# Patient Record
Sex: FEMALE | Race: WHITE | NOT HISPANIC OR LATINO | Employment: OTHER | URBAN - METROPOLITAN AREA
[De-identification: names, ages, dates, MRNs, and addresses within clinical notes are randomized per-mention and may not be internally consistent; named-entity substitution may affect disease eponyms.]

---

## 2017-03-08 ENCOUNTER — GENERIC CONVERSION - ENCOUNTER (OUTPATIENT)
Dept: OTHER | Facility: OTHER | Age: 72
End: 2017-03-08

## 2017-04-06 ENCOUNTER — TRANSCRIBE ORDERS (OUTPATIENT)
Dept: LAB | Facility: CLINIC | Age: 72
End: 2017-04-06

## 2017-04-06 ENCOUNTER — APPOINTMENT (OUTPATIENT)
Dept: LAB | Facility: CLINIC | Age: 72
End: 2017-04-06
Payer: MEDICARE

## 2017-04-06 DIAGNOSIS — I10 ESSENTIAL (PRIMARY) HYPERTENSION: ICD-10-CM

## 2017-04-06 DIAGNOSIS — E78.5 HYPERLIPIDEMIA: ICD-10-CM

## 2017-04-06 DIAGNOSIS — J45.909 UNCOMPLICATED ASTHMA: ICD-10-CM

## 2017-04-06 LAB
ALBUMIN SERPL BCP-MCNC: 3.4 G/DL (ref 3.5–5)
ALP SERPL-CCNC: 77 U/L (ref 46–116)
ALT SERPL W P-5'-P-CCNC: 90 U/L (ref 12–78)
ANION GAP SERPL CALCULATED.3IONS-SCNC: 4 MMOL/L (ref 4–13)
AST SERPL W P-5'-P-CCNC: 53 U/L (ref 5–45)
BACTERIA UR QL AUTO: ABNORMAL /HPF
BASOPHILS # BLD AUTO: 0.01 THOUSANDS/ΜL (ref 0–0.1)
BASOPHILS NFR BLD AUTO: 0 % (ref 0–1)
BILIRUB SERPL-MCNC: 0.54 MG/DL (ref 0.2–1)
BILIRUB UR QL STRIP: NEGATIVE
BUN SERPL-MCNC: 13 MG/DL (ref 5–25)
CALCIUM SERPL-MCNC: 9 MG/DL (ref 8.3–10.1)
CHLORIDE SERPL-SCNC: 104 MMOL/L (ref 100–108)
CHOLEST SERPL-MCNC: 139 MG/DL (ref 50–200)
CLARITY UR: CLEAR
CO2 SERPL-SCNC: 32 MMOL/L (ref 21–32)
COLOR UR: YELLOW
CREAT SERPL-MCNC: 1.04 MG/DL (ref 0.6–1.3)
EOSINOPHIL # BLD AUTO: 0.21 THOUSAND/ΜL (ref 0–0.61)
EOSINOPHIL NFR BLD AUTO: 4 % (ref 0–6)
ERYTHROCYTE [DISTWIDTH] IN BLOOD BY AUTOMATED COUNT: 12.6 % (ref 11.6–15.1)
GFR SERPL CREATININE-BSD FRML MDRD: 52.4 ML/MIN/1.73SQ M
GLUCOSE P FAST SERPL-MCNC: 85 MG/DL (ref 65–99)
GLUCOSE UR STRIP-MCNC: NEGATIVE MG/DL
HCT VFR BLD AUTO: 36 % (ref 34.8–46.1)
HDLC SERPL-MCNC: 70 MG/DL (ref 40–60)
HGB BLD-MCNC: 11.9 G/DL (ref 11.5–15.4)
HGB UR QL STRIP.AUTO: ABNORMAL
KETONES UR STRIP-MCNC: NEGATIVE MG/DL
LDLC SERPL CALC-MCNC: 48 MG/DL (ref 0–100)
LEUKOCYTE ESTERASE UR QL STRIP: NEGATIVE
LYMPHOCYTES # BLD AUTO: 1.24 THOUSANDS/ΜL (ref 0.6–4.47)
LYMPHOCYTES NFR BLD AUTO: 23 % (ref 14–44)
MCH RBC QN AUTO: 30.6 PG (ref 26.8–34.3)
MCHC RBC AUTO-ENTMCNC: 33.1 G/DL (ref 31.4–37.4)
MCV RBC AUTO: 93 FL (ref 82–98)
MONOCYTES # BLD AUTO: 0.37 THOUSAND/ΜL (ref 0.17–1.22)
MONOCYTES NFR BLD AUTO: 7 % (ref 4–12)
NEUTROPHILS # BLD AUTO: 3.47 THOUSANDS/ΜL (ref 1.85–7.62)
NEUTS SEG NFR BLD AUTO: 66 % (ref 43–75)
NITRITE UR QL STRIP: NEGATIVE
NON-SQ EPI CELLS URNS QL MICRO: ABNORMAL /HPF
NRBC BLD AUTO-RTO: 0 /100 WBCS
PH UR STRIP.AUTO: 6.5 [PH] (ref 4.5–8)
PLATELET # BLD AUTO: 206 THOUSANDS/UL (ref 149–390)
PMV BLD AUTO: 12 FL (ref 8.9–12.7)
POTASSIUM SERPL-SCNC: 3.7 MMOL/L (ref 3.5–5.3)
PROT SERPL-MCNC: 6.6 G/DL (ref 6.4–8.2)
PROT UR STRIP-MCNC: ABNORMAL MG/DL
RBC # BLD AUTO: 3.89 MILLION/UL (ref 3.81–5.12)
RBC #/AREA URNS AUTO: ABNORMAL /HPF
SODIUM SERPL-SCNC: 140 MMOL/L (ref 136–145)
SP GR UR STRIP.AUTO: 1.02 (ref 1–1.03)
TRIGL SERPL-MCNC: 106 MG/DL
UROBILINOGEN UR QL STRIP.AUTO: 0.2 E.U./DL
WBC # BLD AUTO: 5.32 THOUSAND/UL (ref 4.31–10.16)
WBC #/AREA URNS AUTO: ABNORMAL /HPF

## 2017-04-06 PROCEDURE — 36415 COLL VENOUS BLD VENIPUNCTURE: CPT

## 2017-04-06 PROCEDURE — 80061 LIPID PANEL: CPT

## 2017-04-06 PROCEDURE — 85025 COMPLETE CBC W/AUTO DIFF WBC: CPT

## 2017-04-06 PROCEDURE — 80053 COMPREHEN METABOLIC PANEL: CPT

## 2017-04-06 PROCEDURE — 87086 URINE CULTURE/COLONY COUNT: CPT

## 2017-04-06 PROCEDURE — 81001 URINALYSIS AUTO W/SCOPE: CPT

## 2017-04-07 ENCOUNTER — GENERIC CONVERSION - ENCOUNTER (OUTPATIENT)
Dept: OTHER | Facility: OTHER | Age: 72
End: 2017-04-07

## 2017-04-07 LAB — BACTERIA UR CULT: NORMAL

## 2017-04-10 ENCOUNTER — ALLSCRIPTS OFFICE VISIT (OUTPATIENT)
Dept: OTHER | Facility: OTHER | Age: 72
End: 2017-04-10

## 2017-05-22 DIAGNOSIS — E78.5 HYPERLIPIDEMIA: ICD-10-CM

## 2017-05-25 ENCOUNTER — ALLSCRIPTS OFFICE VISIT (OUTPATIENT)
Dept: OTHER | Facility: OTHER | Age: 72
End: 2017-05-25

## 2017-06-20 ENCOUNTER — GENERIC CONVERSION - ENCOUNTER (OUTPATIENT)
Dept: OTHER | Facility: OTHER | Age: 72
End: 2017-06-20

## 2017-06-27 ENCOUNTER — GENERIC CONVERSION - ENCOUNTER (OUTPATIENT)
Dept: OTHER | Facility: OTHER | Age: 72
End: 2017-06-27

## 2017-07-24 ENCOUNTER — GENERIC CONVERSION - ENCOUNTER (OUTPATIENT)
Dept: OTHER | Facility: OTHER | Age: 72
End: 2017-07-24

## 2017-08-02 ENCOUNTER — GENERIC CONVERSION - ENCOUNTER (OUTPATIENT)
Dept: OTHER | Facility: OTHER | Age: 72
End: 2017-08-02

## 2017-08-29 ENCOUNTER — APPOINTMENT (OUTPATIENT)
Dept: LAB | Facility: CLINIC | Age: 72
End: 2017-08-29
Payer: MEDICARE

## 2017-08-29 ENCOUNTER — TRANSCRIBE ORDERS (OUTPATIENT)
Dept: LAB | Facility: CLINIC | Age: 72
End: 2017-08-29

## 2017-08-29 ENCOUNTER — GENERIC CONVERSION - ENCOUNTER (OUTPATIENT)
Dept: OTHER | Facility: OTHER | Age: 72
End: 2017-08-29

## 2017-08-29 DIAGNOSIS — E78.5 HYPERLIPIDEMIA: ICD-10-CM

## 2017-08-29 LAB
ALBUMIN SERPL BCP-MCNC: 3.7 G/DL (ref 3.5–5)
ALP SERPL-CCNC: 70 U/L (ref 46–116)
ALT SERPL W P-5'-P-CCNC: 52 U/L (ref 12–78)
ANION GAP SERPL CALCULATED.3IONS-SCNC: 5 MMOL/L (ref 4–13)
AST SERPL W P-5'-P-CCNC: 32 U/L (ref 5–45)
BILIRUB SERPL-MCNC: 0.52 MG/DL (ref 0.2–1)
BUN SERPL-MCNC: 13 MG/DL (ref 5–25)
CALCIUM SERPL-MCNC: 9.6 MG/DL (ref 8.3–10.1)
CHLORIDE SERPL-SCNC: 104 MMOL/L (ref 100–108)
CHOLEST SERPL-MCNC: 153 MG/DL (ref 50–200)
CO2 SERPL-SCNC: 30 MMOL/L (ref 21–32)
CREAT SERPL-MCNC: 1.1 MG/DL (ref 0.6–1.3)
GFR SERPL CREATININE-BSD FRML MDRD: 51 ML/MIN/1.73SQ M
GLUCOSE P FAST SERPL-MCNC: 92 MG/DL (ref 65–99)
HDLC SERPL-MCNC: 65 MG/DL (ref 40–60)
LDLC SERPL CALC-MCNC: 74 MG/DL (ref 0–100)
POTASSIUM SERPL-SCNC: 4.2 MMOL/L (ref 3.5–5.3)
PROT SERPL-MCNC: 7 G/DL (ref 6.4–8.2)
SODIUM SERPL-SCNC: 139 MMOL/L (ref 136–145)
T4 FREE SERPL-MCNC: 1.02 NG/DL (ref 0.76–1.46)
TRIGL SERPL-MCNC: 71 MG/DL
TSH SERPL DL<=0.05 MIU/L-ACNC: 1.44 UIU/ML (ref 0.36–3.74)

## 2017-08-29 PROCEDURE — 84443 ASSAY THYROID STIM HORMONE: CPT

## 2017-08-29 PROCEDURE — 84439 ASSAY OF FREE THYROXINE: CPT

## 2017-08-29 PROCEDURE — 80053 COMPREHEN METABOLIC PANEL: CPT

## 2017-08-29 PROCEDURE — 80061 LIPID PANEL: CPT

## 2017-08-29 PROCEDURE — 36415 COLL VENOUS BLD VENIPUNCTURE: CPT

## 2017-08-30 ENCOUNTER — GENERIC CONVERSION - ENCOUNTER (OUTPATIENT)
Dept: OTHER | Facility: OTHER | Age: 72
End: 2017-08-30

## 2017-08-31 ENCOUNTER — ALLSCRIPTS OFFICE VISIT (OUTPATIENT)
Dept: OTHER | Facility: OTHER | Age: 72
End: 2017-08-31

## 2017-09-21 ENCOUNTER — TRANSCRIBE ORDERS (OUTPATIENT)
Dept: SLEEP CENTER | Facility: CLINIC | Age: 72
End: 2017-09-21

## 2017-09-21 ENCOUNTER — HOSPITAL ENCOUNTER (OUTPATIENT)
Dept: SLEEP CENTER | Facility: CLINIC | Age: 72
Discharge: HOME | End: 2017-09-21
Payer: MEDICARE

## 2017-09-21 DIAGNOSIS — G47.33 OSA (OBSTRUCTIVE SLEEP APNEA): Primary | ICD-10-CM

## 2017-09-21 DIAGNOSIS — Z99.89 OSA ON CPAP: ICD-10-CM

## 2017-09-21 DIAGNOSIS — G47.33 OSA ON CPAP: ICD-10-CM

## 2017-12-10 ENCOUNTER — OFFICE VISIT (OUTPATIENT)
Dept: URGENT CARE | Facility: CLINIC | Age: 72
End: 2017-12-10
Payer: MEDICARE

## 2017-12-10 PROCEDURE — G0463 HOSPITAL OUTPT CLINIC VISIT: HCPCS

## 2017-12-10 PROCEDURE — 99203 OFFICE O/P NEW LOW 30 MIN: CPT

## 2018-01-03 ENCOUNTER — ALLSCRIPTS OFFICE VISIT (OUTPATIENT)
Dept: OTHER | Facility: OTHER | Age: 73
End: 2018-01-03

## 2018-01-03 DIAGNOSIS — N18.30 CHRONIC KIDNEY DISEASE, STAGE III (MODERATE) (HCC): ICD-10-CM

## 2018-01-03 DIAGNOSIS — E78.5 HYPERLIPIDEMIA: ICD-10-CM

## 2018-01-03 DIAGNOSIS — I10 ESSENTIAL (PRIMARY) HYPERTENSION: ICD-10-CM

## 2018-01-08 NOTE — PROGRESS NOTES
Assessment   1  Acute sinusitis (461 9) (J01 90)   2  Esophagitis, reflux (530 11) (K21 0)   3  Benign essential hypertension (401 1) (I10)   4  Dyslipidemia (272 4) (E78 5)    Plan   Acute sinusitis    · LevoFLOXacin 500 MG Oral Tablet; TAKE 1 TABLET DAILY AS DIRECTED  Benign essential hypertension, CKD (chronic kidney disease), stage III, Dyslipidemia    · (1) COMPREHENSIVE METABOLIC PANEL; Status:Active; Requested CFH:[id number];    · (1) LIPID PANEL, FASTING; Status:Active; Requested EGR:[id number]; Dyslipidemia    · Atorvastatin Calcium 10 MG Oral Tablet (Lipitor); Take 1 tablet by mouth  daily  Encounter for special screening examination for genitourinary disorder    · *VB - Urinary Incontinence Screen (Dx Z13 89 Screen for UI); Status:Complete;   Done:    86OGV9466 11:32AM  Esophagitis, reflux    · Esomeprazole Magnesium 40 MG Oral Capsule Delayed Release; TAKE 1    CAPSULE Daily  Hypertension    · Valsartan-Hydrochlorothiazide 320-12 5 MG Oral Tablet (Diovan HCT); TAKE    ONE TABLET BY MOUTH ONCE DAILY  Mild persistent asthma without complication    · Advair Diskus 100-50 MCG/DOSE Inhalation Aerosol Powder Breath Activated;    INHALE ONE PUFF BY MOUTH EVERY TWELVE HOURS   · Albuterol Sulfate (2 5 MG/3ML) 0 083% Inhalation Nebulization Solution; USE 1    UNIT DOSE EVERY 4-6 HOURS AS NEEDED FOR WHEEZING     Discussion/Summary      Flonase daily, hydration, rest, prn tylenol/motrin for fevers/aches  current regimen, obtain labs and due for f/u in 4 months  mammo from GYN  pads, if worsening can follow with urogyn  The patient was counseled regarding diagnostic results,-- instructions for management,-- risk factor reductions,-- prognosis,-- patient and family education,-- impressions,-- importance of compliance with treatment  Possible side effects of new medications were reviewed with the patient/guardian today  The treatment plan was reviewed with the patient/guardian   The patient/guardian understands and agrees with the treatment plan      Chief Complaint   Pt is here for a check up  Pt has c/o being sick  Pt stated she was seen in the UC on 12/10/17 and was given an antibiotics but that did not help  Pt stated she also got the GI bug with vomiting, fever/chills on New Year's Delphine and she has been down ever since  History of Present Illness   Patient is a 80 yo female with PMH of allergies, HTN, anxiety/depression, MAITE on cpap, HLD, osteopenia asthma who presents for routine follow up  She has not been taking fosamax and colestipol  She has been taking protonix but feels nexium worked better for her  Her BP is at goal  She gets her mammos from her GYN Dr Emely Iniguez  She did get her flu shot this year at [facility]  She denies falls  She does report intermittent urinary leakage, she does kegels when she can remember  She has hx of 2 nasal surgeries and has allergies  She was seen in Urgent care in beginning of dec for sinusitis and given augmentin  She was using flonase but then stopped  She reports starting with fever, chills, vomiting, naausea, fatigue - GI bug on New Years Delphine and has felt miserable ever since  Her sinus congesion and pressure and frontal sinus headaches came back with a vengeance  She has been using nasal saline spray and zytrec but no relief  Review of Systems        Constitutional: feeling poorly,-- chills-- and-- feeling tired, but-- no recent weight gain-- and-- no recent weight loss  Eyes: no eye pain,-- eyes not red-- and-- no purulent discharge from the eyes  ENT: nasal discharge-- and-- frontal sinus headache and pressure, but-- as noted in HPI,-- no earache,-- no nosebleeds,-- no sore throat,-- no hearing loss-- and-- no hoarseness  Cardiovascular: the heart rate was not slow,-- no chest pain,-- the heart rate was not fast-- and-- no palpitations        Respiratory: cough, but-- no shortness of breath,-- no wheezing-- and-- no shortness of breath during exertion  Gastrointestinal: abdominal pain,-- nausea,-- vomiting-- and-- improving, but-- no constipation-- and-- no diarrhea  Genitourinary: incontinence, but-- no dysuria,-- no pelvic pain-- and-- no unexplained vaginal bleeding  Musculoskeletal: arthralgias-- and-- myalgias, but-- no joint swelling  Integumentary: no rashes,-- no skin lesions-- and-- no skin wound  Neurological: headache, but-- as noted in HPI,-- no numbness,-- no confusion,-- no dizziness,-- no convulsions,-- no fainting-- and-- no difficulty walking  Psychiatric: anxiety,-- depression-- and-- stable  Endocrine: no proptosis,-- no muscle weakness-- and-- no hot flashes  Hematologic/Lymphatic: no swollen glands,-- no tendency for easy bleeding-- and-- no tendency for easy bruising  Preventive Quality 65 and Older: Falls Risk: The patient fell 0 times in the past 12 months  Urinary Incontinence Symptoms includes: urinary urgency, but no incomplete bladder emptying, no urinary frequency, no urinary hesitancy, no dysuria and no weak stream        ROS reviewed  Active Problems   1  Acute sinusitis (461 9) (J01 90)   2  Age-related nuclear cataract, bilateral (366 16) (H25 13)   3  Allergic rhinitis (477 9) (J30 9)   4  Anemia (285 9) (D64 9)   5  Anxiety disorder (300 00) (F41 9)   6  Benign essential hypertension (401 1) (I10)   7  Central sleep apnea (780 57) (G47 31)   8  Chronic diarrhea (787 91) (K52 9)   9  CKD (chronic kidney disease), stage III (585 3) (N18 3)   10  Depression with anxiety (300 4) (F41 8)   11  Dyslipidemia (272 4) (E78 5)   12  Esophagitis, reflux (530 11) (K21 0)   13  Fatigue (780 79) (R53 83)   14  Glaucoma screening (V80 1) (Z13 5)   15  Hypertension (401 9) (I10)   16  Hypoproteinemia (273 8) (E77 8)   17  Irritant contact dermatitis due to other agents (692 89) (L24 89)   18  Loss of height (781 91) (R29 890)   19  Microscopic hematuria (599 72) (R31 70)   20   Mild persistent asthma without complication (764 65) (P99 77)   21  Nephrosclerosis (403 90,587) (I12 9)   22  Osteoarthritis (715 90) (M19 90)   23  Osteoarthritis of left knee (715 96) (M17 12)   24  Osteopenia (733 90) (M85 80)   25  Overactive bladder (596 51) (N32 81)   26  Pre-operative exam (V72 84) (Z01 818)   27  Primary insomnia (307 42) (F51 01)   28  Pruritus (698 9) (L29 9)   29  Sleep apnea (780 57) (G47 30)   30  Vitamin B12 deficiency (266 2) (E53 8)   31  Vitamin D deficiency (268 9) (E55 9)    Past Medical History   1  History of Acute bronchitis, unspecified organism (466 0) (J20 9)   2  History of Acute bronchospasm (519 11) (J98 01)   3  History of Acute ethmoidal sinusitis (461 2) (J01 20)   4  History of Acute frontal sinusitis (461 1) (J01 10)   5  History of Acute renal insufficiency (593 9) (N28 9)   6  History of Acute upper respiratory infection (465 9) (J06 9)   7  History of Asthmatic bronchitis with exacerbation (493 92) (J45 901)   8  History Of 3  Previous Pregnancies (V61 5)   9  History of acute bronchitis (V12 69) (Z87 09)   10  History of urinary tract infection (V13 02) (Z87 440)   11  History of Postmenopausal Atrophic Vaginitis (627 3)     The active problems and past medical history were reviewed and updated today  Surgical History   1  History of Dilation And Curettage   2  History of Hysteroscopy Of Uterus   3  History of Hysteroscopy With Resection For Intrauterine Polyp Removal   4  History of Nasal Endoscopy Polypectomy   5  History of Tonsillectomy     The surgical history was reviewed and updated today  Family History   Mother    1  Family history of Acute Myocardial Infarction (V17 3)   2  Family history of Osteoporosis (V17 81)   3  Family history of Uterine Cancer (V16 49)  Family History    4  Denied: Family history of Breast Cancer   5  Denied: Family history of Colon Cancer   6   Denied: Family history of Ovarian Cancer     The family history was reviewed and updated today  Social History    · Being A Social Drinker   · Denied: History of Drug Use   · Never A Smoker   · Uses Safety Equipment - Seatbelts  The social history was reviewed and is unchanged  Current Meds    1  Advair Diskus 100-50 MCG/DOSE Inhalation Aerosol Powder Breath Activated; INHALE     ONE PUFF BY MOUTH EVERY TWELVE HOURS  Requested for: 83PAD2538; Last     VE:13CJW6016 Ordered   2  Albuterol Sulfate (2 5 MG/3ML) 0 083% Inhalation Nebulization Solution; USE 1 UNIT     DOSE EVERY 4-6 HOURS AS NEEDED FOR WHEEZING ; Therapy: 63PAJ1433 to (Evaluate:10Nov2015)  Requested for: 93JKJ6888; Last     Rx:06Kkr4689 Ordered   3  Alendronate Sodium 70 MG Oral Tablet; TAKE 1 TABLET BY MOUTH ONCE WEEKLY; Therapy: 65LWD9913 to (Evaluate:75Iav9896)  Requested for: 07XMJ3654; Last     Rx:23Rno6052 Ordered   4  Atorvastatin Calcium 10 MG Oral Tablet; Take 1 tablet by mouth  daily; Therapy: 14VQF5587 to (Magdalena Wallace)  Requested for: 73Pzp1185; Last     Rx:67Oxu1215; Status: ACTIVE - Transmit to Pharmacy - Awaiting Verification Ordered   5  Caltrate 600 TABS; Take one tablet twice daily; Therapy: (Recorded:84Fqf7300) to Recorded   6  Escitalopram Oxalate 20 MG Oral Tablet; take 1 tablet by mouth once daily; Therapy: 83QJN3482 to (26 531592)  Requested for: 10Fvg1911; Last     Rx:76Imj5690 Ordered   7  Fish Oil 1000 MG Oral Capsule; Take one capsule twice daily; Therapy: (Recorded:70Lke7787) to Recorded   8  Flonase 50 MCG/ACT SUSP; Therapy: (Recorded:69Fez5024) to Recorded   9  HydrOXYzine HCl - 25 MG Oral Tablet; TAKE ONE TABLET BY MOUTH THREE TIMES A     DAY AS NEEDED FOR ANXIETY; Therapy: 37Bir2781 to (Last Rx:30Iir2326)  Requested for: 95Jjc8799 Ordered   10  Iron TABS; Therapy: (Monroe Merchant) to Recorded   11  Valsartan-Hydrochlorothiazide 320-12 5 MG Oral Tablet; TAKE ONE TABLET BY MOUTH      ONCE DAILY;       Therapy: 29WYW1623 to (ZCSYNKHP:36TXK0958)  Requested for: 27Ryc9578; Last      Rx:88Xnw6480 Ordered   12  Ventolin  (90 Base) MCG/ACT Inhalation Aerosol Solution; INHALE 2 PUFFS      EVERY 4-6 HOURS AS NEEDED; Therapy: 11DSK4676 to (Last Rx:17Aze5190)  Requested for: 21MVD0593 Ordered   13  ZyrTEC Allergy 10 MG Oral Tablet; TAKE 1 TABLET AT BEDTIME; Therapy: (Recorded:12Wtg6342) to Recorded     The medication list was reviewed and updated today  Allergies   1  Biaxin TABS   2  Codeine Sulfate TABS   3  Estrace CREA   4  hydrocodone   5  Mobic TABS    Vitals   Vital Signs    Recorded: 29OHG5935 07:32PM   Temperature 97 8 F   Heart Rate 67   Respiration 18   Systolic 898   Diastolic 78   Height 5 ft 5 35 in   Weight 185 lb 8 oz   BMI Calculated 30 54   BSA Calculated 1 92   O2 Saturation 96     Physical Exam        Constitutional      General appearance: No acute distress, well appearing and well nourished  overweight-- and-- appears tired  Eyes      Conjunctiva and lids: No swelling, erythema or discharge  Pupils and irises: Equal, round and reactive to light  Ears, Nose, Mouth, and Throat      External inspection of ears and nose: Normal  -- Frontal sinus pressure/tendernesss  Otoscopic examination: Tympanic membranes translucent with normal light reflex  Canals patent without erythema  Nasal mucosa, septum, and turbinates: Abnormal   There was a mucoid discharge from both nares  The bilateral nasal mucosa was boggy,-- crusted,-- edematous-- and-- red  Oropharynx: Normal with no erythema, edema, exudate or lesions  Pulmonary      Respiratory effort: No increased work of breathing or signs of respiratory distress  Auscultation of lungs: Clear to auscultation  Cardiovascular      Auscultation of heart: Normal rate and rhythm, normal S1 and S2, without murmurs         Examination of extremities for edema and/or varicosities: Normal        Abdomen      Abdomen: Non-tender, no masses  Lymphatic      Palpation of lymph nodes in neck: No lymphadenopathy  Musculoskeletal      Gait and station: Normal        Digits and nails: Normal without clubbing or cyanosis  Skin      Skin and subcutaneous tissue: Normal without rashes or lesions  -- pallor  Neurologic      Cranial nerves: Cranial nerves 2-12 intact  Sensation: No sensory loss  Psychiatric      Orientation to person, place, and time: Normal        Mood and affect: Normal           Results/Data   PHQ-9 Adult Depression Screening 64MPE7829 11:35AM User, [employer]      Test Name Result Flag Reference   PHQ-9 Adult Depression Score 6     Over the last two weeks, how often have you been bothered by any of the following problems? Little interest or pleasure in doing things: Several days - 1     Feeling down, depressed, or hopeless: Not at all - 0     Trouble falling or staying asleep, or sleeping too much: More than half the days - 2     Feeling tired or having little energy: More than half the days - 2     Poor appetite or over eating: Several days - 1     Feeling bad about yourself - or that you are a failure or have let yourself or your family down: Not at all - 0     Trouble concentrating on things, such as reading the newspaper or watching television: Not at all - 0     Moving or speaking so slowly that other people could have noticed   Or the opposite -  being so fidgety or restless that you have been moving around a lot more than usual: Not at all - 0     Thoughts that you would be better off dead, or of hurting yourself in some way: Not at all - 0   PHQ-9 Adult Depression Screening Negative     PHQ-9 Difficulty Level Not difficult at all     PHQ-9 Severity Mild Depression        Falls Risk Assessment (Dx Z13 89 Screen for Neurologic Disorder) 32VLH5503 11:32AM User, [employer]      Test Name Result Flag Reference   Falls Risk      No falls in the past year      *VB - Urinary Incontinence Screen (Dx Z13 89 Screen for UI) 21VDK1456 11:32AM Frederick Ko      Test Name Result Flag Reference   Urinary Incontinence Assessment 15TIQ1657        Falls Risk Assessment (Dx Z13 89 Screen for Neurologic Disorder) 60ULN8870 11:32AM User, [facility]      Test Name Result Flag Reference   Falls Risk      No falls in the past year      *VB - Urinary Incontinence Screen (Dx Z13 89 Screen for UI) 30IOB3884 11:32AM Frederick Ko      Test Name Result Flag Reference   Urinary Incontinence Assessment 25IKL6372        Future Appointments      Date/Time Provider Specialty Site   05/15/2018 10:45 AM [place], Sharlett Bosworth, [address]     Signatures    Electronically signed by :  Mikala Brooks, [facility]; Jan 5 2018  7:39PM EST                       (Author)     Electronically signed by : Obie Wheeler DO; Jan 7 2018 11:05AM EST                       (Author)

## 2018-01-13 VITALS
HEIGHT: 66 IN | BODY MASS INDEX: 30.78 KG/M2 | DIASTOLIC BLOOD PRESSURE: 76 MMHG | OXYGEN SATURATION: 97 % | TEMPERATURE: 98.5 F | HEART RATE: 65 BPM | RESPIRATION RATE: 16 BRPM | SYSTOLIC BLOOD PRESSURE: 140 MMHG | WEIGHT: 191.5 LBS

## 2018-01-13 VITALS
HEART RATE: 75 BPM | TEMPERATURE: 98.6 F | HEIGHT: 65 IN | WEIGHT: 98.38 LBS | DIASTOLIC BLOOD PRESSURE: 80 MMHG | SYSTOLIC BLOOD PRESSURE: 120 MMHG | OXYGEN SATURATION: 97 % | RESPIRATION RATE: 16 BRPM | BODY MASS INDEX: 16.39 KG/M2

## 2018-01-14 VITALS
SYSTOLIC BLOOD PRESSURE: 134 MMHG | TEMPERATURE: 97.7 F | DIASTOLIC BLOOD PRESSURE: 86 MMHG | WEIGHT: 189 LBS | HEART RATE: 61 BPM | HEIGHT: 66 IN | BODY MASS INDEX: 30.37 KG/M2 | OXYGEN SATURATION: 95 %

## 2018-01-15 NOTE — RESULT NOTES
Verified Results  (1) COMPREHENSIVE METABOLIC PANEL 61DJI3192 93:02ZV Lucy Mendez Order Number: [id number]     Test Name Result Flag Reference   SODIUM 139 mmol/L  136-145   POTASSIUM 4 2 mmol/L  3 5-5 3   CHLORIDE 104 mmol/L  100-108   CARBON DIOXIDE 30 mmol/L  21-32   ANION GAP (CALC) 5 mmol/L  4-13   BLOOD UREA NITROGEN 13 mg/dL  5-25   CREATININE 1 10 mg/dL  0 60-1 30   Standardized to IDMS reference method   CALCIUM 9 6 mg/dL  8 3-10 1   BILI, TOTAL 0 52 mg/dL  0 20-1 00   ALK PHOSPHATAS 70 U/L     ALT (SGPT) 52 U/L  12-78   Specimen collection should occur prior to Sulfasalazine and/or Sulfapyridine administration due to the potential for falsely depressed results  AST(SGOT) 32 U/L  5-45   Specimen collection should occur prior to Sulfasalazine administration due to the potential for falsely depressed results  ALBUMIN 3 7 g/dL  3 5-5 0   TOTAL PROTEIN 7 0 g/dL  6 4-8 2   eGFR 51 ml/min/1 73sq [facility] Disease Education Program recommendations are as follows:  GFR calculation is accurate only with a steady state creatinine  Chronic Kidney disease less than 60 ml/min/1 73 sq  meters  Kidney failure less than 15 ml/min/1 73 sq  meters  GLUCOSE FASTING 92 mg/dL  65-99   Specimen collection should occur prior to Sulfasalazine administration due to the potential for falsely depressed results  Specimen collection should occur prior to Sulfapyridine administration due to the potential for falsely elevated results  (1) LIPID PANEL, FASTING 94Uzz7492 10:16AM Lucy Mendez Order Number: [id number]     Test Name Result Flag Reference   CHOLESTEROL 153 mg/dL     HDL,DIRECT 65 mg/dL H 40-60   Specimen collection should occur prior to Metamizole administration due to the potential for falsley depressed results  LDL CHOLESTEROL CALCULATED 74 mg/dL  0-100   - Patient Instructions:  This is a fasting blood test  Water,black tea or black  coffee only after 9:00pm the night before test   Drink 2 glasses of water the morning of test       Triglyceride:        Normal ??? ??? ??? ??? ??? ??? ??? <150 mg/dl   ??? ??? ???Borderline High ??? ??? 150-199 mg/dl   ??? ??? ? ?? High ??? ??? ??? ??? ??? ??? ??? 200-499 mg/dl   ??? ??? ? ??Very High ??? ??? ??? ??? ??? >499 mg/dl      Cholesterol:       Desirable ??? ??? ??? ??? <200 mg/dl   ??? ??? Borderline High ??? 200-239 mg/dl   ??? ??? High ??? ??? ??? ??? ??? ??? >239 mg/dl      HDL Cholesterol:       High ??? ???>59 mg/dL   ??? ??? Low ??? ??? <41 mg/dL      This screening LDL is a calculated result  It does not have the accuracy of the Direct Measured LDL in the monitoring of patients with hyperlipidemia and/or statin therapy  Direct Measure LDL (HHE679) must be ordered separately in these patients  TRIGLYCERIDES 71 mg/dL  <=150   Specimen collection should occur prior to N-Acetylcysteine or Metamizole administration due to the potential for falsely depressed results  (1) T4, FREE 03Hgl9345 10:16AM Nonnie [place] Order Number: [id number]     Test Name Result Flag Reference   T4,FREE 1 02 ng/dL  0 76-1 46   Specimen collection should occur prior to Sulfasalazine administration due to the potential for falsely elevated results  (1) TSH 51Yaf2768 10:16AM Gelato [place]ias[place] Order Number: [id number]     Test Name Result Flag Reference   TSH 1 440 uIU/mL  0 358-3 740   - Patient Instructions: This bloodwork is non-fasting  Please drink two glasses of water morning of bloodwork  Patients undergoing fluorescein dye angiography may retain small amounts of fluorescein in the body for 48-72 hours post procedure  Samples containing fluorescein can produce falsely depressed TSH values  If the patient had this procedure,a specimen should be resubmitted post fluorescein clearance            The recommended reference ranges for TSH during pregnancy are as follows:  First trimester 0 1 to 2 5 uIU/mL  Second trimester  0 2 to 3 0 uIU/mL  Third trimester 0 3 to 3 0 uIU/m

## 2018-01-15 NOTE — RESULT NOTES
Verified Results  (1) COMPREHENSIVE METABOLIC PANEL [id number] 11:99WN Maya Ramachandran Order Number: [id number]     Test Name Result Flag Reference   SODIUM 140 mmol/L  136-145   POTASSIUM 3 7 mmol/L  3 5-5 3   CHLORIDE 104 mmol/L  100-108   CARBON DIOXIDE 32 mmol/L  21-32   ANION GAP (CALC) 4 mmol/L  4-13   BLOOD UREA NITROGEN 13 mg/dL  5-25   CREATININE 1 04 mg/dL  0 60-1 30   Standardized to IDMS reference method   CALCIUM 9 0 mg/dL  8 3-10 1   BILI, TOTAL 0 54 mg/dL  0 20-1 00   ALK PHOSPHATAS 77 U/L     ALT (SGPT) 90 U/L H 12-78   AST(SGOT) 53 U/L H 5-45   ALBUMIN 3 4 g/dL L 3 5-5 0   TOTAL PROTEIN 6 6 g/dL  6 4-8 2   eGFR Non-African American 52 4 ml/min/1 73sq m     - Patient Instructions: This is a fasting blood test  Water,black tea or black  coffee only after 9:00pm the night before test Drink 2 glasses of water the morning of test   National Kidney Disease Education Program recommendations are as follows:  GFR calculation is accurate only with a steady state creatinine  Chronic Kidney disease less than 60 ml/min/1 73 sq  meters  Kidney failure less than 15 ml/min/1 73 sq  meters  GLUCOSE FASTING 85 mg/dL  65-99     (1) LIPID PANEL, FASTING [id number] 10:55AM Maya Ramachandran Order Number: [id number]     Test Name Result Flag Reference   CHOLESTEROL 139 mg/dL     HDL,DIRECT 70 mg/dL H 40-60   Specimen collection should occur prior to Metamizole administration due to the potential for falsely depressed results  LDL CHOLESTEROL CALCULATED 48 mg/dL  0-100   - Patient Instructions: This is a fasting blood test  Water,black tea or black  coffee only after 9:00pm the night before test   Drink 2 glasses of water the morning of test     - Patient Instructions:  This is a fasting blood test  Water,black tea or black  coffee only after 9:00pm the night before test Drink 2 glasses of water the morning of test   Triglyceride:         Normal              <150 mg/dl Borderline High    150-199 mg/dl       High               200-499 mg/dl       Very High          >499 mg/dl  Cholesterol:         Desirable        <200 mg/dl      Borderline High  200-239 mg/dl      High             >239 mg/dl  HDL Cholesterol:        High    >59 mg/dL      Low     <41 mg/dL  LDL CALCULATED:    This screening LDL is a calculated result  It does not have the accuracy of the Direct Measured LDL in the monitoring of patients with hyperlipidemia and/or statin therapy  Direct Measure LDL (BBP344) must be ordered separately in these patients  TRIGLYCERIDES 106 mg/dL  <=150   Specimen collection should occur prior to N-Acetylcysteine or Metamizole administration due to the potential for falsely depressed results  (1) CBC/PLT/DIFF [id number] 10:55AM [place] Deng Order Number: [id number]     Test Name Result Flag Reference   WBC COUNT 5 32 Thousand/uL  4 31-10 16   RBC COUNT 3 89 Million/uL  3 81-5 12   HEMOGLOBIN 11 9 g/dL  11 5-15 4   HEMATOCRIT 36 0 %  34 8-46  1   MCV 93 fL  82-98   MCH 30 6 pg  26 8-34 3   MCHC 33 1 g/dL  31 4-37 4   RDW 12 6 %  11 6-15 1   MPV 12 0 fL  8 9-12 7   PLATELET COUNT 338 Thousands/uL  149-390   nRBC AUTOMATED 0 /100 WBCs     NEUTROPHILS RELATIVE PERCENT 66 %  43-75   LYMPHOCYTES RELATIVE PERCENT 23 %  14-44   MONOCYTES RELATIVE PERCENT 7 %  4-12   EOSINOPHILS RELATIVE PERCENT 4 %  0-6   BASOPHILS RELATIVE PERCENT 0 %  0-1   NEUTROPHILS ABSOLUTE COUNT 3 47 Thousands/? ??L  1 85-7 62   LYMPHOCYTES ABSOLUTE COUNT 1 24 Thousands/? ??L  0 60-4 47   MONOCYTES ABSOLUTE COUNT 0 37 Thousand/? ??L  0 17-1 22   EOSINOPHILS ABSOLUTE COUNT 0 21 Thousand/? ??L  0 00-0 61   BASOPHILS ABSOLUTE COUNT 0 01 Thousands/? ??L  0 00-0 10   - Patient Instructions: This bloodwork is non-fasting  Please drink two glasses of water morning of bloodwork  - Patient Instructions: This bloodwork is non-fasting  Please drink two glasses of water morning of bloodwork       (1) URINALYSIS w URINE C/S REFLEX (will reflex a microscopy if leukocytes, occult blood, or nitrites are not within normal limits) 06Apr2017 10:55AM Sal Purchase Order Number: [id number]     Test Name Result Flag Reference   COLOR Yellow     CLARITY Clear     PH UA 6 5  4 5-8 0   LEUKOCYTE ESTERASE UA Negative  Negative   NITRITE UA Negative  Negative   PROTEIN UA Trace mg/dl A Negative   GLUCOSE UA Negative mg/dl  Negative   KETONES UA Negative mg/dl  Negative   UROBILINOGEN UA 0 2 E U /dl  0 2, 1 0 E U /dl   BILIRUBIN UA Negative  Negative   BLOOD UA Moderate A Negative   SPECIFIC GRAVITY UA 1 019  1 003-1 030   BACTERIA None Seen /hpf  None Seen, Occasional   EPITHELIAL CELLS Occasional /hpf  None Seen, Occasional   RBC UA 2-4 /hpf A None Seen   WBC UA None Seen /hpf  None Seen     (1) URINALYSIS w URINE C/S REFLEX (will reflex a microscopy if leukocytes, occult blood, or nitrites are not within normal limits) 06Apr2017 10:55AM Conception Rule   TW Order Number: [id number]     Test Name Result Flag Reference   COLOR Yellow     CLARITY Clear     PH UA 6 5  4 5-8 0   LEUKOCYTE ESTERASE UA Negative  Negative   NITRITE UA Negative  Negative   PROTEIN UA Trace mg/dl A Negative   GLUCOSE UA Negative mg/dl  Negative   KETONES UA Negative mg/dl  Negative   UROBILINOGEN UA 0 2 E U /dl  0 2, 1 0 E U /dl   BILIRUBIN UA Negative  Negative   BLOOD UA Moderate A Negative   SPECIFIC GRAVITY UA 1 019  1 003-1 030   BACTERIA None Seen /hpf  None Seen, Occasional   EPITHELIAL CELLS Occasional /hpf  None Seen, Occasional   RBC UA 2-4 /hpf A None Seen   WBC UA None Seen /hpf  None Seen   CLINICAL REPORT (Report)     Test:        Urine culture  Specimen Type:   Urine  Specimen Date:   4/6/2017 10:55 AM  Result Date:    4/7/2017 1:43 PM  Result Status:   Final result  Resulting Lab:    [address]            Tel: [phone]      CULTURE ------------------                                   No Growth <1000 cfu/mL

## 2018-01-22 VITALS
HEART RATE: 71 BPM | TEMPERATURE: 99.2 F | OXYGEN SATURATION: 97 % | HEIGHT: 65 IN | DIASTOLIC BLOOD PRESSURE: 84 MMHG | WEIGHT: 187 LBS | SYSTOLIC BLOOD PRESSURE: 144 MMHG | BODY MASS INDEX: 31.16 KG/M2

## 2018-01-23 VITALS
TEMPERATURE: 97.8 F | RESPIRATION RATE: 20 BRPM | OXYGEN SATURATION: 96 % | DIASTOLIC BLOOD PRESSURE: 64 MMHG | HEART RATE: 68 BPM | HEIGHT: 66 IN | SYSTOLIC BLOOD PRESSURE: 120 MMHG | BODY MASS INDEX: 30.7 KG/M2 | WEIGHT: 191 LBS

## 2018-01-23 VITALS
DIASTOLIC BLOOD PRESSURE: 78 MMHG | RESPIRATION RATE: 18 BRPM | HEIGHT: 65 IN | TEMPERATURE: 97.8 F | HEART RATE: 67 BPM | BODY MASS INDEX: 30.91 KG/M2 | WEIGHT: 185.5 LBS | OXYGEN SATURATION: 96 % | SYSTOLIC BLOOD PRESSURE: 142 MMHG

## 2018-01-24 NOTE — PROGRESS NOTES
Assessment    1  Acute sinusitis (461 9) (J01 90)    Plan  Acute sinusitis    · Amoxicillin-Pot Clavulanate 875-125 MG Oral Tablet; TAKE 1 TABLET EVERY 12  HOURS UNTIL GONE    Discussion/Summary  Medication Side Effects Reviewed: Possible side effects of new medications were reviewed with the patient/guardian today  Understands and agrees with treatment plan: The treatment plan was reviewed with the patient/guardian  The patient/guardian understands and agrees with the treatment plan   Counseling Documentation With Imm: The patient was counseled regarding instructions for management  Chief Complaint    1  Cold Symptoms  Chief Complaint Free Text Note Form: Patient reports congestion, cough, and yellow mucus since Thursday  History of Present Illness  HPI: Started with cold sx 3 days ago  Having yellow/green nasal drainage and cough with same colored mucous  No fever or chills  Asthma is stable  no fever or chills  Hospital Based Practices Required Assessment:   Pain Assessment   the patient states they do not have pain  (on a scale of 0 to 10, the patient rates the pain at 0 )   Abuse And Domestic Violence Screen    Yes, the patient is safe at home  The patient states no one is hurting them  Depression And Suicide Screen  No, the patient has not had thoughts of hurting themself  No, the patient has not felt depressed in the past 7 days  Prefered Language is  [place]  Primary Language is  English  Readiness To Learn: Receptive  Barriers To Learning: none  Preferred Learning: verbal   Education Completed: equipment/supplies   Teaching Method: verbal   Person Taught: patient   Evaluation Of Learning: verbalized/demonstrated understanding   Cold Symptoms: Dianelys López presents with complaints of cold symptoms     Associated symptoms include runny nose, post nasal drainage and productive cough, but no scratchy throat, no sore throat, no hoarseness, no facial pressure, no facial pain, no headache, no plugged ear(s), no ear pain, no swollen lymph nodes, no wheezing, no shortness of breath, no nausea, no vomiting, no diarrhea, no fever and no chills  Review of Systems  Focused-Female:   Constitutional: no fever and no chills  ENT: nasal discharge, but no earache, no sore throat, no hearing loss and no hoarseness  Cardiovascular: no chest pain  Respiratory: no shortness of breath, no wheezing and no shortness of breath during exertion  Gastrointestinal: no abdominal pain, no nausea, no vomiting and no diarrhea  Musculoskeletal: no myalgias  Integumentary: no rashes  Neurological: no headache and no dizziness  ROS Reviewed:   ROS reviewed  Active Problems    1  Age-related nuclear cataract, bilateral (366 16) (H25 13)   2  Allergic rhinitis (477 9) (J30 9)   3  Anemia (285 9) (D64 9)   4  Anxiety disorder (300 00) (F41 9)   5  Benign essential hypertension (401 1) (I10)   6  Central sleep apnea (780 57) (G47 31)   7  Chronic diarrhea (787 91) (K52 9)   8  CKD (chronic kidney disease), stage III (585 3) (N18 3)   9  Depression with anxiety (300 4) (F41 8)   10  Dyslipidemia (272 4) (E78 5)   11  Esophagitis, reflux (530 11) (K21 0)   12  Fatigue (780 79) (R53 83)   13  Glaucoma screening (V80 1) (Z13 5)   14  Hypertension (401 9) (I10)   15  Hypoproteinemia (273 8) (E77 8)   16  Irritant contact dermatitis due to other agents (692 89) (L24 89)   17  Loss of height (781 91) (R29 890)   18  Microscopic hematuria (599 72) (R31 29)   19  Mild persistent asthma without complication (442 60) (G68 24)   20  Nephrosclerosis (403 90,587) (I12 9)   21  Osteoarthritis (715 90) (M19 90)   22  Osteoarthritis of left knee (715 96) (M17 12)   23  Osteopenia (733 90) (M85 80)   24  Overactive bladder (596 51) (N32 81)   25  Pre-operative exam (V72 84) (Z01 818)   26  Primary insomnia (307 42) (F51 01)   27  Pruritus (698 9) (L29 9)   28  Sleep apnea (780 57) (G47 30)   29   Vitamin B12 deficiency (266 2) (E53 8)   30  Vitamin D deficiency (268 9) (E55 9)    Past Medical History    1  History of Acute bronchitis, unspecified organism (466 0) (J20 9)   2  History of Acute bronchospasm (519 11) (J98 01)   3  History of Acute ethmoidal sinusitis (461 2) (J01 20)   4  History of Acute frontal sinusitis (461 1) (J01 10)   5  History of Acute renal insufficiency (593 9) (N28 9)   6  History of Acute upper respiratory infection (465 9) (J06 9)   7  History of Asthmatic bronchitis with exacerbation (493 92) (J45 901)   8  History Of 3  Previous Pregnancies (V61 5)   9  History of acute bronchitis (V12 69) (Z87 09)   10  History of urinary tract infection (V13 02) (Z87 440)   11  History of Postmenopausal Atrophic Vaginitis (627 3)  Active Problems And Past Medical History Reviewed: The active problems and past medical history were reviewed and updated today  Family History  Mother    1  Family history of Acute Myocardial Infarction (V17 3)   2  Family history of Osteoporosis (V17 81)   3  Family history of Uterine Cancer (V16 49)  Family History    4  Denied: Family history of Breast Cancer   5  Denied: Family history of Colon Cancer   6  Denied: Family history of Ovarian Cancer    Social History    · Being A Social Drinker   · Denied: History of Drug Use   · Never A Smoker   · Uses Safety Equipment - Seatbelts  Social History Reviewed: The social history was reviewed and updated today  Surgical History    1  History of Dilation And Curettage   2  History of Hysteroscopy Of Uterus   3  History of Hysteroscopy With Resection For Intrauterine Polyp Removal   4  History of Nasal Endoscopy Polypectomy   5  History of Tonsillectomy    Current Meds   1  Advair Diskus 100-50 MCG/DOSE Inhalation Aerosol Powder Breath Activated; INHALE   ONE PUFF BY MOUTH EVERY TWELVE HOURS  Requested for: 10XEN9387; Last   NO:40NTW4457 Ordered   2   Albuterol Sulfate (2 5 MG/3ML) 0 083% Inhalation Nebulization Solution; USE 1 UNIT   DOSE EVERY 4-6 HOURS AS NEEDED FOR WHEEZING ; Therapy: 17GDZ8831 to (Evaluate:10Nov2015)  Requested for: 34AZM9178; Last   Rx:12Afy6574 Ordered   3  Alendronate Sodium 70 MG Oral Tablet; TAKE 1 TABLET BY MOUTH ONCE WEEKLY; Therapy: 33WCO8077 to (Evaluate:01Ars7361)  Requested for: 24MZC3250; Last   Rx:74Uar6072 Ordered   4  Atorvastatin Calcium 10 MG Oral Tablet; Take 1 tablet by mouth  daily; Therapy: 02VYD8602 to (Nitish Alva)  Requested for: 85Bby7250; Last   Rx:96Nsx4632; Status: ACTIVE - Transmit to Pharmacy - Awaiting Verification Ordered   5  Caltrate 600 TABS; Take one tablet twice daily; Therapy: (Recorded:21Ixd0332) to Recorded   6  Colestipol HCl - 1 GM Oral Tablet; take 2 tablet twice daily; Therapy: 70Odq0937 to (Last Rx:89Kmg3183)  Requested for: 53Axk6937 Ordered   7  Escitalopram Oxalate 20 MG Oral Tablet; take 1 tablet by mouth once daily; Therapy: 39CNJ2448 to (Brigette Ashby)  Requested for: 84Oyu8033; Last   Rx:27Xxm6118 Ordered   8  Fish Oil 1000 MG Oral Capsule; Take one capsule twice daily; Therapy: (Recorded:47Cuy0390) to Recorded   9  Flonase 50 MCG/ACT SUSP; Therapy: (Recorded:19Oct2012) to Recorded   10  HydrOXYzine HCl - 25 MG Oral Tablet; TAKE ONE TABLET BY MOUTH THREE TIMES A    DAY AS NEEDED FOR ANXIETY; Therapy: 58Nkc7255 to (Last Rx:22Izt7100)  Requested for: 84Axu5023 Ordered   11  Iron TABS; Therapy: (Milvia Saldaña) to Recorded   12  Protonix 40 MG Oral Tablet Delayed Release; TAKE 1 TABLET DAILY; Therapy: 33Ixm4600 to (Evaluate:75Xtd9758)  Requested for: 36Shz0035; Last    Rx:35Cir4400 Ordered   13  Valsartan-Hydrochlorothiazide 320-12 5 MG Oral Tablet; TAKE ONE TABLET BY MOUTH    ONCE DAILY; Therapy: 52RRJ0172 to [phone])  Requested for: 34Hpe9316; Last    Rx:72Wvh6874 Ordered   14  Ventolin  (90 Base) MCG/ACT Inhalation Aerosol Solution; INHALE 2 PUFFS    EVERY 4-6 HOURS AS NEEDED;     Therapy: 79PDK8845 to (Last Rx:49Sjj1068)  Requested for: 11WTQ8965 Ordered   15  ZyrTEC Allergy 10 MG Oral Tablet; TAKE 1 TABLET AT BEDTIME; Therapy: (Recorded:84Rxl3453) to Recorded  Medication List Reviewed: The medication list was reviewed and updated today  Allergies    1  Biaxin TABS   2  Codeine Sulfate TABS   3  Estrace CREA   4  hydrocodone   5  Mobic TABS    Vitals  Signs   Recorded: 81RZY5009 09:58AM   Temperature: 97 8 F, Tympanic  Heart Rate: 68  Respiration: 20  Systolic: 145  Diastolic: 64  Height: 5 ft 6 in  Weight: 191 lb   BMI Calculated: 30 83  BSA Calculated: 1 96  O2 Saturation: 96, RA  LMP: menopausal  Pain Scale: 0/10    Physical Exam    Constitutional   General appearance: No acute distress, well appearing and well nourished  Eyes   Conjunctiva and lids: No swelling, erythema or discharge  Ears, Nose, Mouth, and Throat   External inspection of ears and nose: Normal     Otoscopic examination: Tympanic membranes translucent with normal light reflex  Canals patent without erythema  Nasal mucosa, septum, and turbinates: Abnormal   There was a mucoid discharge from both nares  The bilateral nasal mucosa was boggy  Oropharynx: Normal with no erythema, edema, exudate or lesions  Pulmonary   Respiratory effort: No increased work of breathing or signs of respiratory distress  Auscultation of lungs: Clear to auscultation  Cardiovascular   Auscultation of heart: Normal rate and rhythm, normal S1 and S2, without murmurs  Lymphatic   Palpation of lymph nodes in neck: No lymphadenopathy      Musculoskeletal   Gait and station: Normal     Psychiatric   Mood and affect: Normal        Future Appointments    Date/Time Provider Specialty Site   01/03/2018 11:00 AM Kira Gomez [place]  64      Signatures   Electronically signed by : YURI Carlisle; Dec 10 2017 10:11AM EST                       (Author)    Electronically signed by : Carmen Cunha MEHRDAD MARIEE ; Dec 11 2017  2:44PM EST                       (Co-author)

## 2018-04-09 DIAGNOSIS — F32.89 OTHER DEPRESSION: ICD-10-CM

## 2018-04-09 DIAGNOSIS — E78.2 MIXED HYPERLIPIDEMIA: Primary | ICD-10-CM

## 2018-04-09 DIAGNOSIS — K21.9 GASTROESOPHAGEAL REFLUX DISEASE WITHOUT ESOPHAGITIS: ICD-10-CM

## 2018-04-09 RX ORDER — ATORVASTATIN CALCIUM 10 MG/1
1 TABLET, FILM COATED ORAL DAILY
COMMUNITY
Start: 2012-10-01 | End: 2018-04-09 | Stop reason: SDUPTHER

## 2018-04-09 RX ORDER — ATORVASTATIN CALCIUM 10 MG/1
10 TABLET, FILM COATED ORAL DAILY
Qty: 90 TABLET | Refills: 0 | Status: SHIPPED | OUTPATIENT
Start: 2018-04-09 | End: 2018-07-06 | Stop reason: SDUPTHER

## 2018-04-09 RX ORDER — ESCITALOPRAM OXALATE 20 MG/1
20 TABLET ORAL DAILY
Qty: 90 TABLET | Refills: 0 | Status: SHIPPED | OUTPATIENT
Start: 2018-04-09 | End: 2018-07-06 | Stop reason: SDUPTHER

## 2018-04-09 RX ORDER — ESOMEPRAZOLE MAGNESIUM 40 MG/1
1 CAPSULE, DELAYED RELEASE ORAL DAILY
COMMUNITY
Start: 2018-01-03 | End: 2018-04-09 | Stop reason: SDUPTHER

## 2018-04-09 RX ORDER — ESCITALOPRAM OXALATE 20 MG/1
1 TABLET ORAL DAILY
COMMUNITY
Start: 2011-02-25 | End: 2018-04-09 | Stop reason: SDUPTHER

## 2018-05-10 RX ORDER — ALBUTEROL SULFATE 90 UG/1
2 AEROSOL, METERED RESPIRATORY (INHALATION) EVERY 4 HOURS PRN
COMMUNITY
Start: 2017-05-25

## 2018-05-10 RX ORDER — FLUTICASONE PROPIONATE 50 MCG
SPRAY, SUSPENSION (ML) NASAL
COMMUNITY

## 2018-05-10 RX ORDER — PANTOPRAZOLE SODIUM 40 MG/1
1 TABLET, DELAYED RELEASE ORAL DAILY
COMMUNITY
Start: 2017-08-31 | End: 2018-05-15

## 2018-05-10 RX ORDER — HYDROXYZINE HYDROCHLORIDE 25 MG/1
TABLET, FILM COATED ORAL
COMMUNITY
Start: 2017-08-31 | End: 2018-11-12

## 2018-05-10 RX ORDER — ALENDRONATE SODIUM 70 MG/1
1 TABLET ORAL WEEKLY
COMMUNITY
Start: 2015-11-10 | End: 2018-11-12

## 2018-05-10 RX ORDER — CHLORAL HYDRATE 500 MG
1 CAPSULE ORAL 2 TIMES DAILY
COMMUNITY
End: 2019-10-01

## 2018-05-10 RX ORDER — ALBUTEROL SULFATE 2.5 MG/3ML
1 SOLUTION RESPIRATORY (INHALATION) EVERY 4 HOURS PRN
COMMUNITY
Start: 2015-05-14

## 2018-05-10 RX ORDER — IRON,CARBONYL/ASCORBIC ACID 100-250 MG
TABLET ORAL
COMMUNITY
End: 2019-10-01

## 2018-05-10 RX ORDER — MONTELUKAST SODIUM 4 MG/1
2 TABLET, CHEWABLE ORAL 2 TIMES DAILY
COMMUNITY
Start: 2017-04-10 | End: 2018-05-15

## 2018-05-10 RX ORDER — VALSARTAN AND HYDROCHLOROTHIAZIDE 320; 12.5 MG/1; MG/1
1 TABLET, FILM COATED ORAL DAILY
COMMUNITY
Start: 2015-06-24 | End: 2018-07-06 | Stop reason: SDUPTHER

## 2018-05-11 ENCOUNTER — TRANSCRIBE ORDERS (OUTPATIENT)
Dept: LAB | Facility: CLINIC | Age: 73
End: 2018-05-11

## 2018-05-11 ENCOUNTER — APPOINTMENT (OUTPATIENT)
Dept: LAB | Facility: CLINIC | Age: 73
End: 2018-05-11
Payer: MEDICARE

## 2018-05-11 DIAGNOSIS — N18.30 CHRONIC KIDNEY DISEASE, STAGE III (MODERATE) (HCC): ICD-10-CM

## 2018-05-11 DIAGNOSIS — I10 ESSENTIAL (PRIMARY) HYPERTENSION: ICD-10-CM

## 2018-05-11 DIAGNOSIS — E78.5 HYPERLIPIDEMIA: ICD-10-CM

## 2018-05-11 LAB
ALBUMIN SERPL BCP-MCNC: 3.7 G/DL (ref 3.5–5)
ALP SERPL-CCNC: 77 U/L (ref 46–116)
ALT SERPL W P-5'-P-CCNC: 69 U/L (ref 12–78)
ANION GAP SERPL CALCULATED.3IONS-SCNC: 6 MMOL/L (ref 4–13)
AST SERPL W P-5'-P-CCNC: 41 U/L (ref 5–45)
BILIRUB SERPL-MCNC: 0.66 MG/DL (ref 0.2–1)
BUN SERPL-MCNC: 16 MG/DL (ref 5–25)
CALCIUM SERPL-MCNC: 9.4 MG/DL (ref 8.3–10.1)
CHLORIDE SERPL-SCNC: 102 MMOL/L (ref 100–108)
CHOLEST SERPL-MCNC: 156 MG/DL (ref 50–200)
CO2 SERPL-SCNC: 30 MMOL/L (ref 21–32)
CREAT SERPL-MCNC: 1.18 MG/DL (ref 0.6–1.3)
GFR SERPL CREATININE-BSD FRML MDRD: 47 ML/MIN/1.73SQ M
GLUCOSE P FAST SERPL-MCNC: 103 MG/DL (ref 65–99)
HDLC SERPL-MCNC: 59 MG/DL (ref 40–60)
LDLC SERPL CALC-MCNC: 81 MG/DL (ref 0–100)
NONHDLC SERPL-MCNC: 97 MG/DL
POTASSIUM SERPL-SCNC: 3.8 MMOL/L (ref 3.5–5.3)
PROT SERPL-MCNC: 7.1 G/DL (ref 6.4–8.2)
SODIUM SERPL-SCNC: 138 MMOL/L (ref 136–145)
TRIGL SERPL-MCNC: 78 MG/DL

## 2018-05-11 PROCEDURE — 36415 COLL VENOUS BLD VENIPUNCTURE: CPT

## 2018-05-11 PROCEDURE — 80053 COMPREHEN METABOLIC PANEL: CPT

## 2018-05-11 PROCEDURE — 80061 LIPID PANEL: CPT

## 2018-05-15 ENCOUNTER — OFFICE VISIT (OUTPATIENT)
Dept: FAMILY MEDICINE CLINIC | Facility: CLINIC | Age: 73
End: 2018-05-15
Payer: MEDICARE

## 2018-05-15 VITALS
BODY MASS INDEX: 31.99 KG/M2 | OXYGEN SATURATION: 96 % | SYSTOLIC BLOOD PRESSURE: 138 MMHG | WEIGHT: 192 LBS | TEMPERATURE: 98.3 F | HEIGHT: 65 IN | DIASTOLIC BLOOD PRESSURE: 70 MMHG | HEART RATE: 85 BPM | RESPIRATION RATE: 16 BRPM

## 2018-05-15 VITALS
SYSTOLIC BLOOD PRESSURE: 138 MMHG | BODY MASS INDEX: 31.99 KG/M2 | DIASTOLIC BLOOD PRESSURE: 70 MMHG | OXYGEN SATURATION: 96 % | HEIGHT: 65 IN | TEMPERATURE: 98.3 F | HEART RATE: 85 BPM | WEIGHT: 192 LBS | RESPIRATION RATE: 17 BRPM

## 2018-05-15 DIAGNOSIS — E55.9 VITAMIN D INSUFFICIENCY: ICD-10-CM

## 2018-05-15 DIAGNOSIS — K21.9 GASTROESOPHAGEAL REFLUX DISEASE WITHOUT ESOPHAGITIS: ICD-10-CM

## 2018-05-15 DIAGNOSIS — M85.80 OSTEOPENIA, UNSPECIFIED LOCATION: ICD-10-CM

## 2018-05-15 DIAGNOSIS — Z13.29 THYROID DISORDER SCREEN: ICD-10-CM

## 2018-05-15 DIAGNOSIS — N18.30 CKD (CHRONIC KIDNEY DISEASE), STAGE III (HCC): ICD-10-CM

## 2018-05-15 DIAGNOSIS — Z12.11 SCREENING FOR COLON CANCER: ICD-10-CM

## 2018-05-15 DIAGNOSIS — Z13.5 SCREENING FOR GLAUCOMA: ICD-10-CM

## 2018-05-15 DIAGNOSIS — N95.1 MENOPAUSAL AND FEMALE CLIMACTERIC STATES: ICD-10-CM

## 2018-05-15 DIAGNOSIS — R55 PRE-SYNCOPE: ICD-10-CM

## 2018-05-15 DIAGNOSIS — I10 BENIGN ESSENTIAL HYPERTENSION: ICD-10-CM

## 2018-05-15 DIAGNOSIS — Z13.820 SCREENING FOR OSTEOPOROSIS: ICD-10-CM

## 2018-05-15 DIAGNOSIS — K21.00 ESOPHAGITIS, REFLUX: ICD-10-CM

## 2018-05-15 DIAGNOSIS — K58.0 IRRITABLE BOWEL SYNDROME WITH DIARRHEA: Primary | ICD-10-CM

## 2018-05-15 DIAGNOSIS — Z12.11 SCREEN FOR COLON CANCER: ICD-10-CM

## 2018-05-15 DIAGNOSIS — Z12.39 SCREENING FOR MALIGNANT NEOPLASM OF BREAST: Primary | ICD-10-CM

## 2018-05-15 PROBLEM — S73.109A SPRAIN OF HIP: Status: ACTIVE | Noted: 2018-05-15

## 2018-05-15 PROBLEM — M25.469 KNEE JOINT EFFUSION: Status: ACTIVE | Noted: 2018-05-15

## 2018-05-15 PROBLEM — M70.60 TROCHANTERIC BURSITIS: Status: ACTIVE | Noted: 2018-05-15

## 2018-05-15 PROBLEM — M22.40 CHONDROMALACIA PATELLAE: Status: ACTIVE | Noted: 2018-05-15

## 2018-05-15 PROCEDURE — 99214 OFFICE O/P EST MOD 30 MIN: CPT | Performed by: PHYSICIAN ASSISTANT

## 2018-05-15 PROCEDURE — G0439 PPPS, SUBSEQ VISIT: HCPCS | Performed by: PHYSICIAN ASSISTANT

## 2018-05-15 RX ORDER — COLESEVELAM 180 1/1
1875 TABLET ORAL 2 TIMES DAILY WITH MEALS
Qty: 540 TABLET | Refills: 1 | Status: SHIPPED | OUTPATIENT
Start: 2018-05-15 | End: 2020-09-18

## 2018-05-15 NOTE — PROGRESS NOTES
HPI:  Jayda Bucio is a 72 y o  female here for her Subsequent Wellness Visit      Patient Active Problem List   Diagnosis    Age-related nuclear cataract, bilateral    Allergic rhinitis    Anemia    Anxiety disorder    Benign essential hypertension    Central sleep apnea    Chronic diarrhea    CKD (chronic kidney disease), stage III    Depression with anxiety    Dyslipidemia    Esophagitis, reflux    Hypoproteinemia (HCC)    Loss of height    Microscopic hematuria    Mild persistent asthma without complication    Nephrosclerosis    Osteoarthritis of left knee    Osteopenia    Overactive bladder    Primary insomnia    Vitamin B12 deficiency    Vitamin D deficiency    Chondromalacia patellae    Knee joint effusion    Sprain of hip    Trochanteric bursitis     Past Medical History:   Diagnosis Date    Acute bronchospasm     [id number]  resolved    Acute ethmoidal sinusitis     [id number] resolved    Acute renal insufficiency     [id number] resolved    Asthmatic bronchitis with exacerbation     [id number] resolved     Past Surgical History:   Procedure Laterality Date    DILATION AND CURETTAGE OF UTERUS      HYSTEROSCOPY      of uterus  d&c 1997    HYSTEROSCOPY W/ POLYPECTOMY      2000 and 2001  resolved    NOSE SURGERY      nasal endoscopy polypectomy    TONSILLECTOMY       Family History   Problem Relation Age of Onset    Heart attack Mother     Osteoporosis Mother     Uterine cancer Mother      History   Smoking Status    Never Smoker   Smokeless Tobacco    Never Used     History   Alcohol Use    Yes     Comment: social      History   Drug Use No     /70   Pulse 85   Temp 98 3 °F (36 8 °C)   Resp 16   Ht 5' 5" (1 651 m)   Wt 87 1 kg (192 lb)   SpO2 96%   BMI 31 95 kg/m²       Current Outpatient Prescriptions   Medication Sig Dispense Refill    albuterol (2 5 mg/3 mL) 0 083 % nebulizer solution Inhale 1 each      albuterol (VENTOLIN HFA) 90 mcg/act inhaler Inhale 2 puffs      alendronate (FOSAMAX) 70 mg tablet Take 1 tablet by mouth once a week      atorvastatin (LIPITOR) 10 mg tablet Take 1 tablet (10 mg total) by mouth daily 90 tablet 0    Calcium Carbonate (CALTRATE 600) 1500 (600 Ca) MG TABS Take 1 tablet by mouth 2 (two) times a day      Cetirizine HCl (ZYRTEC ALLERGY PO) Take 1 tablet by mouth      colestipol (COLESTID) 1 g tablet Take 2 tablets by mouth 2 (two) times a day      escitalopram (LEXAPRO) 20 mg tablet Take 1 tablet (20 mg total) by mouth daily 90 tablet 0    esomeprazole (NexIUM) 20 mg capsule Take 1 capsule (20 mg total) by mouth daily 90 capsule 0    fluticasone (FLONASE) 50 mcg/act nasal spray into each nostril      fluticasone-salmeterol (ADVAIR DISKUS) 100-50 mcg/dose Inhale 1 puff every 12 (twelve) hours      hydrOXYzine HCL (ATARAX) 25 mg tablet Take by mouth      Iron-Vitamin C (IRON 100/C) 100-250 MG TABS Take by mouth      Omega-3 Fatty Acids (FISH OIL) 1,000 mg Take 1 capsule by mouth 2 (two) times a day      pantoprazole (PROTONIX) 40 mg tablet Take 1 tablet by mouth daily      valsartan-hydrochlorothiazide (DIOVAN-HCT) 320-12 5 MG per tablet Take 1 tablet by mouth daily       No current facility-administered medications for this visit        Allergies   Allergen Reactions    Clarithromycin      Reaction Date: 12Apr2011;     Codeine      Reaction Date: 12Apr2011;     Estradiol      Annotation - [id number]: ELEVATES BP    Hydrocodone     Meloxicam     Tramadol Other (See Comments)     red,itchy    Hydrocodone-Acetaminophen Rash     Immunization History   Administered Date(s) Administered    Influenza 10/02/2013, 10/30/2014, 09/18/2015, 11/14/2016    Influenza Split High Dose Preservative Free IM 10/24/2012, 09/30/2016    Influenza TIV (IM) 11/06/2009, 10/30/2014, 10/01/2015    Pneumococcal Conjugate 13-Valent 09/15/2015    Zoster 03/03/2014, 09/23/2014       Patient Care Team:  Destiny Eduardo DO as PCP - General Brennan No, MD Birdia Rubinstein, DO Evie Pringle, DO      Medicare Screening Tests and Risk Assessments:  AWV Clinical     ISAR:   Previous hospitalizations?:  No       Once in a Lifetime Medicare Screening:   EKG performed?:  Yes    AAA screening performed? (if performed, please add date to Health Maintenance):  No       Medicare Screening Tests and Risk Assessment:   AAA Risk Assessment    None Indicated:  Yes    Osteoporosis Risk Assessment     Female:  Yes   :  Yes :  No   Age over 48:  Yes Low body weight (<127lbs):  No   Tobacco use:  No Alcohol use:  No   Low calcium diet:  No PMHX of fractures:  No   FHX of fractures:  No    HIV Risk Assessment    None indicated:  Yes        Drug and Alcohol Use:   Tobacco use    Cigarettes:  never smoker    Smokeless:  never used smokeless tobacco    Tobacco use duration    Tobacco Cessation Readiness    Alcohol use    Alcohol use:  rare use    Alcohol Treatment Readiness   Illicit Drug Use    Drug use:  never    Drug type:  no sedative use       Diet & Exercise:   Diet   What is your diet?:  Regular, Limited junk food   How many servings a day of the following:   Fruits and Vegetables:  1-2 Meat:  1-2   Whole Grains:  1 Simple Carbs:  2   Dairy:  0 Soda:  4   Coffee:  0 Tea:  0   Exercise    Do you currently exercise?:  currently not exercising       Cognitive Impairment Screening:   Depression screening preformed:  Yes     PHQ-9 Depression scale score:  0   Depression screening results:  negative for symptoms   Cognitive Impairment Screening    Do you have difficulty learning or retaining new information?:  No Do you have difficulty handling new tasks?:  No   Do you have difficulty with reasoning?:  No Do you have difficulty with spatial ability and orientation?:  No   Do you have difficulty with language?:  No Do you have difficulty with behavior?:  No       Functional Ability/Level of Safety:   Hearing    Hearing difficulties:  Yes Bilateral:  normal   Hearing aid:  No    Hearing Impairment Assessment    Hearing status:  No impairment   Do your family members ever complain that you turn on the radio or T V  too loudly?:  No Do you find that other people have to repeat themselves when talking to you?:  No   Do you have difficulty hearing while talking on the phone?:  No Has anyone ever told you that you are speaking too loudly when talking with them?:  No   Do you have trouble hearing the doorbell or phone ringing?:  No Do you have difficulty hearing such that you feel frustrated talking to people?:  No   Do you feel sad because you cannot hear well?:  No Do you feel inconvienced due to your hearing problem?:  No   Do you think you would be a happier person if you could hear better?:  No Would you be willing to go for a hearing aid fitting if suggested?:  No   Current Activities    Status:  unlimited ADL's, unlimited driving, unlimited IADL's, unlimited social activities   Help needed with the folllowing:    Using the phone:  No Transportation:  No   Shopping:  No Preparing Meals:  No   Doing Housework:  No Doing Laundry:  No   Managing Medications:  No Managing Money:  No   ADL    Feeding:  Independant   Oral hygiene and Facial grooming:  Independant   Bathing:  Independant   Upper Body Dressing:  Independant   Lower Body Dressing:  Independant   Toileting:  Independant   Bed Mobility:  Independant   Fall Risk   Have you fallen in the last 12 months?:  No Are you unsteady on your feet?:  No    Are you taking any medications that may cause fatigue or dizziness?:  No   Do you have any chronic conditions that may contribute to a fall?:  Arthritis, Joint disease Do you rush to the bathroom potentially risking a fall?:  No   Injury History   Polypharmacy:  No Antidepressant Use:  No   Sedative Use:  No Antihypertensive Use:  No   Previous Fall:  No Alcohol Use:  No   Deconditioning:  No Visual Impairment:  No   Cogitive Impairment:  No Mmobility Impairment:  No   Postural Hypotension:  No Urinary Incontinence:  Yes       Home Safety:   Are there hazards in your environment?:  No   If you fell, would you need help to get back up from the ground?:  No Do you have problems or concerns getting in/out of a bed, chair, tub, or toilet?:  No   Do you feel unsteady when walking?:  No Is your activity limited by pain?:  No   Do you have handrails and grab-bars in the home?:  Yes Are emergency numbers kept by the phone and regularly updated?:  Yes   Are you and/or family members aware of the dangers of smoking in bed?:  Yes Are firearms stored securely?:  Yes   Do you have working smoke alarms and fire extinguisher?:  Yes Do all household members know how to use them?:  Yes   Have you left the stove on unsupervised?:  No    Home Safety Risk Factors   Unfamilar with surroundings:  No Uneven floors:  No   Stairs or handrail saftey risk:  No Loose rugs:  No   Household clutter:  No Poor household lighting:  No   No grab bars in bathroom:  No Further evaluation needed:  No       Advanced Directives:   Advanced Directives    Living Will:  Yes Durable POA for healthcare:   Yes   Advanced directive:  Yes    Patient's End of Life Decisions    Reviewed with patient:  Yes        Urinary Incontinence:       Glaucoma:            Provider Screening     Preventative Screening/Counseling:   Cardiovascular Screening/Counseling:   (Labs Q5 years, EKG optional one-time)   General:  Screening Current Counseling:  Healthy Diet, Improve Blood Pressure          Diabetes Screening/Counseling:   (2 tests/year if Pre-Diabetes or 1 test/year if no Diabetes)   General:  Screening Current Counseling:  Healthy Diet, Healthy Weight          Colorectal Cancer Screening/Counseling:   (FOBT Q1 yr; Flex Sig Q4 yrs or Q10 yrs after Screening Colonoscopy; Screening Colonoscpy Q2 yrs High Risk or Q10 yrs Low Risk; Barium Enema Q2 yrs High Risk or Q4 yrs Low Risk)   General:  Risks and Benefits Discussed Due for: Studies:  Fecal Occult Blood         Prostate Cancer Screening/Counseling:   (Annual)          Breast Cancer Screening/Counseling:   (Baseline Age 28 - 43; Annual Age 36+)   General:  Risks and Benefits Discussed Due for: Studies:  mammogram         Cervical Cancer Screening/Counseling:   (Annual for High Risk or Childbearing Age with Abnormal Pap in Last 3 yrs; Every 2 all others)   General:  Risks and Benefits Discussed Due for: Studies:  Cervical Pap Smear          Osteoporosis Screening/Counseling:   (Every 2 Yrs if at risk or more if medically necessary)   General:  Risks and Benefits Discussed  Due for: Studies:  Bone Density Ultrasound         AAA Screening/Counseling:   (Once per Lifetime with risk factors)    General:  Risks and Benefits Discussed, Screening Not Indicated           Glaucoma Screening/Counseling:   (Annual)   General:  Risks and Benefits Discussed Due for: Referrals:  referral to optometry         HIV Screening/Counseling:   (Voluntary; Once annually for high risk OR 3 times for Pregnancy at diagnosis of IUP; 3rd trimester; and at Labor   General:  Screening Not Indicated           Hepatitis C Screening:   Hepatitis C Counseling Provided: Yes               Immunizations:   Pneumococcal (Once in a Lifetime):  Lifetime Vaccine Completed   Zostavax (Medicare D Coverage, Pt >66 yo):  Zostavax Vaccine UTD       Other Preventative Couseling (Non-Medicare Wellness Visit Required):       Referrals (Non-Medicare Wellness Visit Required):       Medical Equipment/Suppliers:           No exam data present  Reviewed Updated [facility] Prior Wellness Visits:     Last Medicare wellness visit information was reviewed, patient interviewed and updates made to the record today - yes     Assessment and Plan:  1  Screening for malignant neoplasm of breast  Mammo screening bilateral w 3d & cad   2  Screening for colon cancer  Occult Bloood,Fecal Immunochemical   3   Osteopenia, unspecified location  DXA bone density spine hip and pelvis   4  Menopausal and female climacteric states  DXA bone density spine hip and pelvis   5  Screen for colon cancer  Refuses colonoscopy    6  Screening for glaucoma  She has follow up with Dr Carlos Jordan in July 7   Screening for osteoporosis         Health Maintenance Due   Topic Date Due    Hepatitis C Screening  1945    COLONOSCOPY  1945    Depression Screening PHQ-9  03/21/1959    DTaP,Tdap,and Td Vaccines (1 - Tdap) 03/21/1968    PNEUMOCOCCAL POLYSACCHARIDE VACCINE AGE 65 AND OVER  03/21/2012    GLAUCOMA SCREENING 67+ YR  03/21/2014    MAMMOGRAM  09/19/2017

## 2018-05-15 NOTE — PATIENT INSTRUCTIONS
Please take pepcid at the opposite time of day and trial x 2 months and please call back if not helping for acid reflux  Do not take at same time as hydroxyzine  Near Syncope   AMBULATORY CARE:   Near syncope,  also called presyncope, is the feeling that you may faint (lose consciousness), but you do not  You can control some health conditions that cause near syncope  Your healthcare providers can help you create a plan to manage near syncope and prevent episodes  Signs and symptoms that may occur before a near syncope episode:   · Feeling dizzy or lightheaded    · Nausea, feeling warm, sweating, or clammy skin    · Blurred vision, or vision that is blacking out    · Confusion    · Trouble breathing    · A fast or fluttering heartbeat, chest pain, or a weak pulse  Seek care immediately if:   · You have sudden chest pain  · You have trouble breathing or shortness of breath  · You have vision changes, are sweating, and have nausea while you are sitting or lying down  · You feel dizzy or flushed and your heart is fluttering  · You lose consciousness  · You cannot use your arm, hand, foot, or leg, or it feels weak  · You have trouble speaking or understanding others when they speak  Contact your healthcare provider if:   · You have new or worsening symptoms  · Your heart beats faster or slower than usual      · You have questions or concerns about your condition or care  Manage near syncope:   · Sit or lie down when needed  This includes when you feel dizzy, your throat is getting tight, and your vision changes  Raise your legs above the level of your heart  · Take slow, deep breaths if you start to breathe faster with anxiety or fear  This can help decrease dizziness and the feeling that you might faint  · Keep a record of your near syncope episodes  Include your symptoms and your activity before and after the episode   The record can help your healthcare provider find the cause of your near syncope and help you manage episodes  Prevent a near syncope episode:   · Move slowly and let yourself get used to one position before you move to another position  This is very important when you change from a lying or sitting position to a standing position  Take some deep breaths before you stand up from a lying position  Stand up slowly  Sudden movements may cause a fainting spell  Sit on the side of the bed or couch for a few minutes before you stand up  If you are on bedrest, try to be upright for about 2 hours each day, or as directed  Do not lock your legs if you are standing for a long period of time  Move your legs and bend your knees to keep blood flowing  · Follow your healthcare provider's recommendations  Your provider may  recommend that you drink more liquids to prevent dehydration  You may also need to have more salt to keep your blood pressure from dropping too low and causing syncope  Your provider will tell you how much liquid and sodium to have each day  · Watch for signs of low blood sugar  These include hunger, nervousness, sweating, and fast or fluttery heartbeats  Talk with your healthcare provider about ways to keep your blood sugar level steady  · Check your blood pressure often  This is important if you take medicine to lower your blood pressure  Check your blood pressure when you are lying down and when you are standing  Ask how often to check during the day  Keep a record of your blood pressure numbers  Your healthcare provider may use the record to help plan your treatment  · Do not strain if you are constipated  You may faint if you strain to have a bowel movement  Walking is the best way to get your bowels moving  Eat foods high in fiber to make it easier to have a bowel movement  Good examples are high-fiber cereals, beans, vegetables, and whole-grain breads  Prune juice may help make bowel movements softer      · Do not exercise outside on a hot day  The combination of physical activity and heat can lead to dehydration  This can cause syncope  Follow up with your healthcare provider as directed: You may need more tests to help find the cause of your near syncope episodes  The tests will help healthcare providers plan the best treatment for you  Write down your questions so you remember to ask them during your visits  © 2017 [employer]  Information is for End User's use only and may not be sold, redistributed or otherwise used for commercial purposes  All illustrations and images included in CareNotes® are the copyrighted property of [employer]  or [employer]  The above information is an  only  It is not intended as medical advice for individual conditions or treatments  Talk to your doctor, nurse or pharmacist before following any medical regimen to see if it is safe and effective for you

## 2018-05-15 NOTE — PROGRESS NOTES
Routine Follow-up    Alyssia Alcazar 72 y o  female   Date:  5/15/2018    Assessment and Plan:    Luis A He was seen today for follow-up  Diagnoses and all orders for this visit:    Irritable bowel syndrome with diarrhea  -     colesevelam (WELCHOL) 625 mg tablet; Take 3 tablets (1,875 mg total) by mouth 2 (two) times a day with meals    Gastroesophageal reflux disease without esophagitis  - continue nexium every morning, will trial pepcid at night x 2 months   - if no improvement with combined PPI and H2 blocker, should pursue GI consult for ? EGD     Pre-syncope  -     Echo complete with contrast if indicated; Future  -     ECG 12 lead; Future  -     Comprehensive metabolic panel; Future  -     TSH, 3rd generation with T4 reflex; Future  -     CBC and differential; Future  - ?related to BP fluctuates, not anxious or panicked at the time, given on exertion with obtain echo and EKG, no assoc sob, no hx of diabetes so doubt hypoglycemia    Benign essential hypertension  -     Echo complete with contrast if indicated; Future  -     ECG 12 lead; Future  -     Comprehensive metabolic panel; Future  -     TSH, 3rd generation with T4 reflex; Future  -     CBC and differential; Future  - stable, continue current regimen     CKD (chronic kidney disease), stage III  -     Comprehensive metabolic panel; Future  -     CBC and differential; Future  - kidney function at baseline, continues to avoid NSAIDs, control BP     Thyroid disorder screen  -     TSH, 3rd generation with T4 reflex; Future    Osteopenia, unspecified location  -     Vitamin D 25 hydroxy; Future    Vitamin D insufficiency  -     Vitamin D 25 hydroxy; Future            HPI:  Chief Complaint   Patient presents with    Follow-up     HPI   Patient is a 71 yo female with PMH below who presents with routine follow up and to review recent labs  Her cholesterol and kidney function is stable, unchanged   She wishes to go back on welchol as it helped her diarrhea with IBS  She has been offf it for 6 months and not controlled  She is still having acid reflux despite nexium in the morning  She was told by allergist to trial pepcid in addition but did not try yet, she is aware to not take it at the same time of day  Her asthma is currently controlled on current regimen with exercerbations  She does get intermittent wheezing relieved by prn inhalers  She follows with Dr Silver Nagy, allergist  She is overdue for dexa and mammo  She refuses colonoscopy  She states that over the past few months she has experienced episodes consisting of "getting sweaty, my head feeling funny and feeling like my legs are weak and numb"  It has happened 3 times - once after a long walk, once while walking around in [place], and the third she cannot remember where she was  It has not happened in a few months  She states that it normal goes away on its own after 10 mins of sitting and relaxing  No medicine changes at that time  She denies associated syncope, dizziness, sob, palpitations, heart racing, anxiousness, chest pain, n/v      ROS: Review of Systems   Constitutional: Negative for activity change, appetite change, chills, fatigue, fever and unexpected weight change  HENT: Negative for congestion, ear pain, hearing loss, nosebleeds, sore throat and trouble swallowing  Eyes: Negative for pain, discharge and visual disturbance  Respiratory: Positive for wheezing  Negative for cough, chest tightness and shortness of breath  Cardiovascular: Negative for chest pain, palpitations and leg swelling  Gastrointestinal: Positive for diarrhea  Negative for abdominal pain, blood in stool, constipation, nausea and vomiting  Endocrine: Negative for cold intolerance and heat intolerance  Genitourinary: Negative for difficulty urinating, dysuria and hematuria  Musculoskeletal: Negative for arthralgias, gait problem and myalgias  Skin: Negative for color change, rash and wound  Neurological: Negative for dizziness, syncope (3 episodes of presyncopal symptoms), weakness, light-headedness and headaches  Hematological: Negative for adenopathy  Does not bruise/bleed easily  Psychiatric/Behavioral: Negative for confusion and sleep disturbance  The patient is not nervous/anxious (controled )          Past Medical History:   Diagnosis Date    Acute bronchospasm     [id number]  resolved    Acute ethmoidal sinusitis     [id number] resolved    Acute renal insufficiency     [id number] resolved    Asthmatic bronchitis with exacerbation     [id number] resolved     Patient Active Problem List   Diagnosis    Age-related nuclear cataract, bilateral    Allergic rhinitis    Anemia    Anxiety disorder    Benign essential hypertension    Central sleep apnea    Irritable bowel syndrome with diarrhea    CKD (chronic kidney disease), stage III    Depression with anxiety    Dyslipidemia    Esophagitis, reflux    Hypoproteinemia (HCC)    Loss of height    Microscopic hematuria    Mild persistent asthma without complication    Nephrosclerosis    Osteoarthritis of left knee    Osteopenia    Overactive bladder    Primary insomnia    Vitamin B12 deficiency    Vitamin D deficiency    Chondromalacia patellae    Knee joint effusion    Sprain of hip    Trochanteric bursitis       Past Surgical History:   Procedure Laterality Date    DILATION AND CURETTAGE OF UTERUS      HYSTEROSCOPY      of uterus  d&c 1997    HYSTEROSCOPY W/ POLYPECTOMY      2000 and 2001  resolved    NOSE SURGERY      nasal endoscopy polypectomy    TONSILLECTOMY         Social History     Social History    Marital status: /Civil Union     Spouse name: N/A    Number of children: N/A    Years of education: N/A     Social History Main Topics    Smoking status: Never Smoker    Smokeless tobacco: Never Used    Alcohol use Yes      Comment: social    Drug use: No    Sexual activity: Not Asked     Other Topics Concern    None     Social History Narrative    Uses safety equipment  Seat belt       Family History   Problem Relation Age of Onset    Heart attack Mother     Osteoporosis Mother     Uterine cancer Mother        Allergies   Allergen Reactions    Clarithromycin      Reaction Date: 12Apr2011;     Codeine      Reaction Date: 12Apr2011;     Estradiol      Annotation - [id number]: ELEVATES BP    Hydrocodone     Meloxicam     Tramadol Other (See Comments)     red,itchy    Hydrocodone-Acetaminophen Rash         Current Outpatient Prescriptions:     albuterol (VENTOLIN HFA) 90 mcg/act inhaler, Inhale 2 puffs, Disp: , Rfl:     alendronate (FOSAMAX) 70 mg tablet, Take 1 tablet by mouth once a week, Disp: , Rfl:     atorvastatin (LIPITOR) 10 mg tablet, Take 1 tablet (10 mg total) by mouth daily, Disp: 90 tablet, Rfl: 0    Calcium Carbonate (CALTRATE 600) 1500 (600 Ca) MG TABS, Take 1 tablet by mouth 2 (two) times a day, Disp: , Rfl:     Cetirizine HCl (ZYRTEC ALLERGY PO), Take 1 tablet by mouth, Disp: , Rfl:     escitalopram (LEXAPRO) 20 mg tablet, Take 1 tablet (20 mg total) by mouth daily, Disp: 90 tablet, Rfl: 0    esomeprazole (NexIUM) 20 mg capsule, Take 1 capsule (20 mg total) by mouth daily, Disp: 90 capsule, Rfl: 0    fluticasone (FLONASE) 50 mcg/act nasal spray, into each nostril, Disp: , Rfl:     fluticasone-salmeterol (ADVAIR DISKUS) 100-50 mcg/dose, Inhale 1 puff every 12 (twelve) hours, Disp: , Rfl:     hydrOXYzine HCL (ATARAX) 25 mg tablet, Take by mouth, Disp: , Rfl:     Iron-Vitamin C (IRON 100/C) 100-250 MG TABS, Take by mouth, Disp: , Rfl:     Omega-3 Fatty Acids (FISH OIL) 1,000 mg, Take 1 capsule by mouth 2 (two) times a day, Disp: , Rfl:     valsartan-hydrochlorothiazide (DIOVAN-HCT) 320-12 5 MG per tablet, Take 1 tablet by mouth daily, Disp: , Rfl:     albuterol (2 5 mg/3 mL) 0 083 % nebulizer solution, Inhale 1 each, Disp: , Rfl:     colesevelam (WELCHOL) 625 mg tablet, Take 3 tablets (1,875 mg total) by mouth 2 (two) times a day with meals, Disp: 540 tablet, Rfl: 1      Physical Exam:  /70   Pulse 85   Temp 98 3 °F (36 8 °C)   Resp 17   Ht 5' 5" (1 651 m)   Wt 87 1 kg (192 lb)   SpO2 96%   BMI 31 95 kg/m²     Physical Exam   Constitutional: She is oriented to person, place, and time  Vital signs are normal  She appears well-developed and well-nourished  No distress  HENT:   Head: Normocephalic and atraumatic  Right Ear: Tympanic membrane, external ear and ear canal normal    Left Ear: Tympanic membrane, external ear and ear canal normal    Nose: Nose normal    Mouth/Throat: Oropharynx is clear and moist  No oropharyngeal exudate  Poor dentition   Eyes: Conjunctivae and lids are normal  Pupils are equal, round, and reactive to light  Neck: Trachea normal and normal range of motion  Neck supple  No thyromegaly present  Cardiovascular: Normal rate, regular rhythm, S1 normal, S2 normal and intact distal pulses  Exam reveals no gallop  Murmur (very slight systolic murmur) heard  Pulmonary/Chest: Effort normal and breath sounds normal  No respiratory distress  She has no wheezes  She has no rhonchi  She has no rales  Abdominal: Soft  Normal appearance and bowel sounds are normal  She exhibits no mass  There is no hepatosplenomegaly  There is no tenderness  Musculoskeletal: Normal range of motion  She exhibits no edema or deformity  Lymphadenopathy:     She has no cervical adenopathy  Neurological: She is alert and oriented to person, place, and time  She has normal reflexes  No cranial nerve deficit or sensory deficit  Skin: Skin is warm and dry  No rash noted  No cyanosis  No pallor  Nails show no clubbing  Psychiatric: She has a normal mood and affect   Her behavior is normal  Cognition and memory are normal          Labs:  Lab Results   Component Value Date    WBC 5 32 04/06/2017    HGB 11 9 04/06/2017    HCT 36 0 04/06/2017    MCV 93 04/06/2017    PLT 206 04/06/2017     Lab Results   Component Value Date     05/11/2018    K 3 8 05/11/2018     05/11/2018    CO2 30 05/11/2018    ANIONGAP 6 05/11/2018    BUN 16 05/11/2018    CREATININE 1 18 05/11/2018    GLUF 103 (H) 05/11/2018    CALCIUM 9 4 05/11/2018    AST 41 05/11/2018    ALT 69 05/11/2018    ALKPHOS 77 05/11/2018    PROT 7 1 05/11/2018    BILITOT 0 66 05/11/2018    EGFR 47 05/11/2018

## 2018-05-30 ENCOUNTER — HOSPITAL ENCOUNTER (OUTPATIENT)
Dept: BONE DENSITY | Facility: HOSPITAL | Age: 73
Discharge: HOME | End: 2018-05-30
Payer: MEDICARE

## 2018-05-30 ENCOUNTER — HOSPITAL ENCOUNTER (OUTPATIENT)
Dept: MAMMOGRAPHY | Facility: HOSPITAL | Age: 73
Discharge: HOME | End: 2018-05-30
Payer: MEDICARE

## 2018-05-30 DIAGNOSIS — M85.80 OSTEOPENIA, UNSPECIFIED LOCATION: ICD-10-CM

## 2018-05-30 DIAGNOSIS — Z12.39 SCREENING FOR MALIGNANT NEOPLASM OF BREAST: ICD-10-CM

## 2018-05-30 DIAGNOSIS — N95.1 MENOPAUSAL AND FEMALE CLIMACTERIC STATES: ICD-10-CM

## 2018-05-30 PROCEDURE — 77063 BREAST TOMOSYNTHESIS BI: CPT

## 2018-05-30 PROCEDURE — 77080 DXA BONE DENSITY AXIAL: CPT

## 2018-05-30 PROCEDURE — 77067 SCR MAMMO BI INCL CAD: CPT

## 2018-06-21 ENCOUNTER — HOSPITAL ENCOUNTER (OUTPATIENT)
Dept: NON INVASIVE DIAGNOSTICS | Facility: CLINIC | Age: 73
Discharge: HOME | End: 2018-06-21
Payer: MEDICARE

## 2018-06-21 DIAGNOSIS — R55 PRE-SYNCOPE: ICD-10-CM

## 2018-06-21 DIAGNOSIS — I10 BENIGN ESSENTIAL HYPERTENSION: ICD-10-CM

## 2018-06-21 PROCEDURE — 93306 TTE W/DOPPLER COMPLETE: CPT | Performed by: INTERNAL MEDICINE

## 2018-06-21 PROCEDURE — 93306 TTE W/DOPPLER COMPLETE: CPT

## 2018-07-06 DIAGNOSIS — F32.89 OTHER DEPRESSION: ICD-10-CM

## 2018-07-06 DIAGNOSIS — E78.2 MIXED HYPERLIPIDEMIA: ICD-10-CM

## 2018-07-06 DIAGNOSIS — I10 ESSENTIAL HYPERTENSION: Primary | ICD-10-CM

## 2018-07-06 DIAGNOSIS — K21.9 GASTROESOPHAGEAL REFLUX DISEASE WITHOUT ESOPHAGITIS: ICD-10-CM

## 2018-07-06 DIAGNOSIS — J45.909 ASTHMA, UNSPECIFIED ASTHMA SEVERITY, UNSPECIFIED WHETHER COMPLICATED, UNSPECIFIED WHETHER PERSISTENT: ICD-10-CM

## 2018-07-07 RX ORDER — ATORVASTATIN CALCIUM 10 MG/1
10 TABLET, FILM COATED ORAL DAILY
Qty: 90 TABLET | Refills: 0 | Status: SHIPPED | OUTPATIENT
Start: 2018-07-07 | End: 2018-09-28 | Stop reason: SDUPTHER

## 2018-07-07 RX ORDER — ESCITALOPRAM OXALATE 20 MG/1
20 TABLET ORAL DAILY
Qty: 90 TABLET | Refills: 0 | Status: SHIPPED | OUTPATIENT
Start: 2018-07-07 | End: 2018-09-28 | Stop reason: SDUPTHER

## 2018-07-07 RX ORDER — VALSARTAN AND HYDROCHLOROTHIAZIDE 320; 12.5 MG/1; MG/1
1 TABLET, FILM COATED ORAL DAILY
Qty: 90 TABLET | Refills: 0 | Status: SHIPPED | OUTPATIENT
Start: 2018-07-07 | End: 2018-08-01

## 2018-08-01 DIAGNOSIS — I10 HYPERTENSION, UNSPECIFIED TYPE: Primary | ICD-10-CM

## 2018-08-01 RX ORDER — LOSARTAN POTASSIUM AND HYDROCHLOROTHIAZIDE 12.5; 1 MG/1; MG/1
1 TABLET ORAL DAILY
Qty: 90 TABLET | Refills: 0 | Status: SHIPPED | OUTPATIENT
Start: 2018-08-01 | End: 2018-09-28 | Stop reason: SDUPTHER

## 2018-09-18 ENCOUNTER — TRANSCRIBE ORDERS (OUTPATIENT)
Dept: LAB | Facility: CLINIC | Age: 73
End: 2018-09-18

## 2018-09-18 ENCOUNTER — APPOINTMENT (OUTPATIENT)
Dept: LAB | Facility: CLINIC | Age: 73
End: 2018-09-18
Payer: MEDICARE

## 2018-09-18 DIAGNOSIS — E55.9 VITAMIN D INSUFFICIENCY: ICD-10-CM

## 2018-09-18 DIAGNOSIS — N18.30 CKD (CHRONIC KIDNEY DISEASE), STAGE III (HCC): ICD-10-CM

## 2018-09-18 DIAGNOSIS — Z13.29 THYROID DISORDER SCREEN: ICD-10-CM

## 2018-09-18 DIAGNOSIS — R55 PRE-SYNCOPE: ICD-10-CM

## 2018-09-18 DIAGNOSIS — M85.80 OSTEOPENIA, UNSPECIFIED LOCATION: ICD-10-CM

## 2018-09-18 DIAGNOSIS — I10 BENIGN ESSENTIAL HYPERTENSION: ICD-10-CM

## 2018-09-18 LAB
25(OH)D3 SERPL-MCNC: 36.8 NG/ML (ref 30–100)
ALBUMIN SERPL BCP-MCNC: 3.6 G/DL (ref 3.5–5)
ALP SERPL-CCNC: 75 U/L (ref 46–116)
ALT SERPL W P-5'-P-CCNC: 37 U/L (ref 12–78)
ANION GAP SERPL CALCULATED.3IONS-SCNC: 6 MMOL/L (ref 4–13)
AST SERPL W P-5'-P-CCNC: 26 U/L (ref 5–45)
BASOPHILS # BLD AUTO: 0.01 THOUSANDS/ΜL (ref 0–0.1)
BASOPHILS NFR BLD AUTO: 0 % (ref 0–1)
BILIRUB SERPL-MCNC: 0.59 MG/DL (ref 0.2–1)
BUN SERPL-MCNC: 12 MG/DL (ref 5–25)
CALCIUM SERPL-MCNC: 8.9 MG/DL (ref 8.3–10.1)
CHLORIDE SERPL-SCNC: 102 MMOL/L (ref 100–108)
CO2 SERPL-SCNC: 31 MMOL/L (ref 21–32)
CREAT SERPL-MCNC: 1.09 MG/DL (ref 0.6–1.3)
EOSINOPHIL # BLD AUTO: 0.2 THOUSAND/ΜL (ref 0–0.61)
EOSINOPHIL NFR BLD AUTO: 4 % (ref 0–6)
ERYTHROCYTE [DISTWIDTH] IN BLOOD BY AUTOMATED COUNT: 12.3 % (ref 11.6–15.1)
GFR SERPL CREATININE-BSD FRML MDRD: 51 ML/MIN/1.73SQ M
GLUCOSE P FAST SERPL-MCNC: 82 MG/DL (ref 65–99)
HCT VFR BLD AUTO: 35.7 % (ref 34.8–46.1)
HGB BLD-MCNC: 11.8 G/DL (ref 11.5–15.4)
IMM GRANULOCYTES # BLD AUTO: 0.01 THOUSAND/UL (ref 0–0.2)
IMM GRANULOCYTES NFR BLD AUTO: 0 % (ref 0–2)
LYMPHOCYTES # BLD AUTO: 1.28 THOUSANDS/ΜL (ref 0.6–4.47)
LYMPHOCYTES NFR BLD AUTO: 22 % (ref 14–44)
MCH RBC QN AUTO: 30.7 PG (ref 26.8–34.3)
MCHC RBC AUTO-ENTMCNC: 33.1 G/DL (ref 31.4–37.4)
MCV RBC AUTO: 93 FL (ref 82–98)
MONOCYTES # BLD AUTO: 0.32 THOUSAND/ΜL (ref 0.17–1.22)
MONOCYTES NFR BLD AUTO: 6 % (ref 4–12)
NEUTROPHILS # BLD AUTO: 3.9 THOUSANDS/ΜL (ref 1.85–7.62)
NEUTS SEG NFR BLD AUTO: 68 % (ref 43–75)
NRBC BLD AUTO-RTO: 0 /100 WBCS
PLATELET # BLD AUTO: 178 THOUSANDS/UL (ref 149–390)
PMV BLD AUTO: 12.2 FL (ref 8.9–12.7)
POTASSIUM SERPL-SCNC: 3.4 MMOL/L (ref 3.5–5.3)
PROT SERPL-MCNC: 6.7 G/DL (ref 6.4–8.2)
RBC # BLD AUTO: 3.84 MILLION/UL (ref 3.81–5.12)
SODIUM SERPL-SCNC: 139 MMOL/L (ref 136–145)
TSH SERPL DL<=0.05 MIU/L-ACNC: 1.87 UIU/ML (ref 0.36–3.74)
WBC # BLD AUTO: 5.72 THOUSAND/UL (ref 4.31–10.16)

## 2018-09-18 PROCEDURE — 84443 ASSAY THYROID STIM HORMONE: CPT

## 2018-09-18 PROCEDURE — 80053 COMPREHEN METABOLIC PANEL: CPT

## 2018-09-18 PROCEDURE — 82306 VITAMIN D 25 HYDROXY: CPT

## 2018-09-18 PROCEDURE — 85025 COMPLETE CBC W/AUTO DIFF WBC: CPT

## 2018-09-18 PROCEDURE — 36415 COLL VENOUS BLD VENIPUNCTURE: CPT

## 2018-09-21 DIAGNOSIS — Z12.11 SCREEN FOR COLON CANCER: Primary | ICD-10-CM

## 2018-09-28 ENCOUNTER — OFFICE VISIT (OUTPATIENT)
Dept: FAMILY MEDICINE CLINIC | Facility: CLINIC | Age: 73
End: 2018-09-28
Payer: MEDICARE

## 2018-09-28 ENCOUNTER — OFFICE VISIT (OUTPATIENT)
Dept: SLEEP CENTER | Facility: CLINIC | Age: 73
End: 2018-09-28
Payer: MEDICARE

## 2018-09-28 VITALS
WEIGHT: 185.6 LBS | SYSTOLIC BLOOD PRESSURE: 124 MMHG | TEMPERATURE: 98 F | BODY MASS INDEX: 29.83 KG/M2 | HEART RATE: 63 BPM | HEIGHT: 66 IN | DIASTOLIC BLOOD PRESSURE: 76 MMHG | OXYGEN SATURATION: 92 %

## 2018-09-28 VITALS
HEART RATE: 76 BPM | SYSTOLIC BLOOD PRESSURE: 100 MMHG | WEIGHT: 186 LBS | BODY MASS INDEX: 30.99 KG/M2 | HEIGHT: 65 IN | DIASTOLIC BLOOD PRESSURE: 70 MMHG

## 2018-09-28 DIAGNOSIS — F32.89 OTHER DEPRESSION: ICD-10-CM

## 2018-09-28 DIAGNOSIS — K21.9 GASTROESOPHAGEAL REFLUX DISEASE WITHOUT ESOPHAGITIS: ICD-10-CM

## 2018-09-28 DIAGNOSIS — G47.33 OSA (OBSTRUCTIVE SLEEP APNEA): ICD-10-CM

## 2018-09-28 DIAGNOSIS — I10 HYPERTENSION, UNSPECIFIED TYPE: ICD-10-CM

## 2018-09-28 DIAGNOSIS — L30.9 ECZEMA, UNSPECIFIED TYPE: Primary | ICD-10-CM

## 2018-09-28 DIAGNOSIS — E78.2 MIXED HYPERLIPIDEMIA: ICD-10-CM

## 2018-09-28 DIAGNOSIS — F41.8 DEPRESSION WITH ANXIETY: ICD-10-CM

## 2018-09-28 DIAGNOSIS — Z23 FLU VACCINE NEED: ICD-10-CM

## 2018-09-28 PROBLEM — M25.469 KNEE JOINT EFFUSION: Status: RESOLVED | Noted: 2018-05-15 | Resolved: 2018-09-28

## 2018-09-28 PROBLEM — S73.109A SPRAIN OF HIP: Status: RESOLVED | Noted: 2018-05-15 | Resolved: 2018-09-28

## 2018-09-28 PROCEDURE — 99213 OFFICE O/P EST LOW 20 MIN: CPT | Performed by: INTERNAL MEDICINE

## 2018-09-28 PROCEDURE — 90662 IIV NO PRSV INCREASED AG IM: CPT

## 2018-09-28 PROCEDURE — G0008 ADMIN INFLUENZA VIRUS VAC: HCPCS

## 2018-09-28 PROCEDURE — 99215 OFFICE O/P EST HI 40 MIN: CPT | Performed by: PHYSICIAN ASSISTANT

## 2018-09-28 RX ORDER — ESCITALOPRAM OXALATE 20 MG/1
20 TABLET ORAL DAILY
Qty: 90 TABLET | Refills: 1 | Status: SHIPPED | OUTPATIENT
Start: 2018-09-28 | End: 2019-03-19 | Stop reason: SDUPTHER

## 2018-09-28 RX ORDER — LOSARTAN POTASSIUM AND HYDROCHLOROTHIAZIDE 12.5; 1 MG/1; MG/1
1 TABLET ORAL DAILY
Qty: 90 TABLET | Refills: 1 | Status: SHIPPED | OUTPATIENT
Start: 2018-09-28 | End: 2019-03-19 | Stop reason: SDUPTHER

## 2018-09-28 RX ORDER — BUPROPION HYDROCHLORIDE 150 MG/1
150 TABLET ORAL DAILY
Qty: 30 TABLET | Refills: 1 | Status: SHIPPED | OUTPATIENT
Start: 2018-09-28 | End: 2018-11-12 | Stop reason: SDUPTHER

## 2018-09-28 RX ORDER — ATORVASTATIN CALCIUM 10 MG/1
10 TABLET, FILM COATED ORAL DAILY
Qty: 90 TABLET | Refills: 1 | Status: SHIPPED | OUTPATIENT
Start: 2018-09-28 | End: 2019-03-19 | Stop reason: SDUPTHER

## 2018-09-28 RX ORDER — TRIAMCINOLONE ACETONIDE 5 MG/G
CREAM TOPICAL 3 TIMES DAILY PRN
Qty: 30 G | Refills: 1 | Status: SHIPPED | OUTPATIENT
Start: 2018-09-28 | End: 2018-11-12

## 2018-09-28 NOTE — PROGRESS NOTES
Progress Note - Sleep Center   Alyssia Alcazar :1945 MRN: [id number]      Reason for Visit:  72 y  o female here for annual follow-up    Assessment:  Doing well on current therapy of CPAP 14 cm for moderate obstructive sleep apnea (AHI = 24)  Plan:  Continue same  Try a [employer] full face mask  Follow up: One year    History of Present Illness:  History of MAITE on PAP therapy  Fully compliant and deriving benefit  Review of Systems      Genitourinary none   Cardiology none   Gastrointestinal frequent heartburn/acid reflux   Neurology none   Constitutional none   Integumentary rash or dry skin and itching   Psychiatry anxiety   Musculoskeletal joint pain   Pulmonary none   ENT none   Endocrine excessive thirst   Hematological none         Historical Information    Past Medical History: History reviewed  No pertinent past medical history        Past Surgical History:   Past Surgical History:   Procedure Laterality Date    DILATION AND CURETTAGE OF UTERUS      HYSTEROSCOPY      of uterus  d&c     HYSTEROSCOPY W/ POLYPECTOMY       and   resolved    NOSE SURGERY      nasal endoscopy polypectomy    TONSILLECTOMY         Social History:   Social History     Social History    Marital status: /Civil Union     Spouse name: N/A    Number of children: N/A    Years of education: N/A     Social History Main Topics    Smoking status: Never Smoker    Smokeless tobacco: Never Used    Alcohol use Yes      Comment: social    Drug use: No    Sexual activity: Not Asked     Other Topics Concern    None     Social History Narrative    Uses safety equipment  Seat belt       Family History:   Family History   Problem Relation Age of Onset    Heart attack Mother     Osteoporosis Mother     Uterine cancer Mother        Medications/Allergies:      Current Outpatient Prescriptions:     albuterol (2 5 mg/3 mL) 0 083 % nebulizer solution, Inhale 1 each, Disp: , Rfl:     albuterol (VENTOLIN HFA) 90 mcg/act inhaler, Inhale 2 puffs, Disp: , Rfl:     alendronate (FOSAMAX) 70 mg tablet, Take 1 tablet by mouth once a week, Disp: , Rfl:     atorvastatin (LIPITOR) 10 mg tablet, Take 1 tablet (10 mg total) by mouth daily, Disp: 90 tablet, Rfl: 1    buPROPion (WELLBUTRIN XL) 150 mg 24 hr tablet, Take 1 tablet (150 mg total) by mouth daily, Disp: 30 tablet, Rfl: 1    Calcium Carbonate (CALTRATE 600) 1500 (600 Ca) MG TABS, Take 1 tablet by mouth 2 (two) times a day, Disp: , Rfl:     Cetirizine HCl (ZYRTEC ALLERGY PO), Take 1 tablet by mouth, Disp: , Rfl:     colesevelam (WELCHOL) 625 mg tablet, Take 3 tablets (1,875 mg total) by mouth 2 (two) times a day with meals, Disp: 540 tablet, Rfl: 1    escitalopram (LEXAPRO) 20 mg tablet, Take 1 tablet (20 mg total) by mouth daily, Disp: 90 tablet, Rfl: 1    esomeprazole (NexIUM) 20 mg capsule, Take 1 capsule (20 mg total) by mouth daily, Disp: 90 capsule, Rfl: 1    fluticasone (FLONASE) 50 mcg/act nasal spray, into each nostril, Disp: , Rfl:     fluticasone-salmeterol (ADVAIR DISKUS) 100-50 mcg/dose inhaler, Inhale 1 puff every 12 (twelve) hours, Disp: 50 Inhaler, Rfl: 0    hydrOXYzine HCL (ATARAX) 25 mg tablet, Take by mouth, Disp: , Rfl:     Iron-Vitamin C (IRON 100/C) 100-250 MG TABS, Take by mouth, Disp: , Rfl:     losartan-hydrochlorothiazide (HYZAAR) 100-12 5 MG per tablet, Take 1 tablet by mouth daily, Disp: 90 tablet, Rfl: 1    Omega-3 Fatty Acids (FISH OIL) 1,000 mg, Take 1 capsule by mouth 2 (two) times a day, Disp: , Rfl:     triamcinolone (KENALOG) 0 5 % cream, Apply topically 3 (three) times a day as needed for irritation or rash (itch), Disp: 30 g, Rfl: 1          Objective      Vital Signs:   Vitals:    09/28/18 1400   BP: 100/70   Pulse: 76     [place] Sleepiness Scale:  Total score: 6        Physical Exam:    General: Alert, appropriate, cooperative, overweight    Head: NC/AT    Skin: Warm, dry    Neuro: No motor abnormalities, cranial nerves appear intact    Extremity: No clubbing, cyanosis      DME Provider: [facility]    Counseling / Coordination of Care  Total clinic time spent today 15 minutes  Greater than 50% of total time was spent with the patient and / or family counseling and / or coordination of care  A description of the counseling / coordination of care: Discussed equipment and response to treatment  ESTELA Payton    Board Certified Sleep Specialist

## 2018-09-28 NOTE — PATIENT INSTRUCTIONS
Anxiety   AMBULATORY CARE:   Anxiety  is a condition that causes you to feel extremely worried or nervous  The feelings are so strong that they can cause problems with your daily activities or sleep  Anxiety may be triggered by something you fear, or it may happen without a cause  Family or work stress, smoking, caffeine, and alcohol can increase your risk for anxiety  Certain medicines or health conditions can also increase your risk  Anxiety can become a long-term condition if it is not managed or treated  Common signs and symptoms that may occur with anxiety:   · Fatigue or muscle tightness     · Shaking, restlessness, or irritability     · Problems focusing     · Trouble sleeping     · Feeling jumpy, easily startled, or dizzy     · Rapid heartbeat or shortness of breath  Call 911 if:   · You have chest pain, tightness, or heaviness that may spread to your shoulders, arms, jaw, neck, or back  · You feel like hurting yourself or someone else  Contact your healthcare provider if:   · Your symptoms get worse or do not get better with treatment  · You think your medicine may be causing side effects  · Your anxiety keeps you from doing your regular daily activities  · You have new symptoms since your last visit  · You have questions or concerns about your condition or care  Treatment for anxiety  may include medicines to help you feel calm and relaxed, and decrease your symptoms  Medicines are usually given together with therapy or other treatments  Manage anxiety:   · Talk to someone about your anxiety  Your healthcare provider may suggest counseling  Cognitive behavioral therapy can help you understand and change how you react to events that trigger your symptoms  You might feel more comfortable talking with a friend or family member about your anxiety  Choose someone you know will be supportive and encouraging  · Find ways to relax    Activities such as exercise, meditation, or listening to music can help you relax  Spend time with friends, or do things you enjoy  · Practice deep breathing  Deep breathing can help you relax when you feel anxious  Focus on taking slow, deep breaths several times a day, or during an anxiety attack  Breathe in through your nose and out through your mouth  · Create a regular sleep routine  Regular sleep can help you feel calmer during the day  Go to sleep and wake up at the same times every day  Do not watch television or use the computer right before bed  Your room should be comfortable, dark, and quiet  · Eat a variety of healthy foods  Healthy foods include fruits, vegetables, low-fat dairy products, lean meats, fish, whole-grain breads, and cooked beans  Healthy foods can help you feel less anxious and have more energy  · Exercise regularly  Exercise can increase your energy level  Exercise may also lift your mood and help you sleep better  Your healthcare provider can help you create an exercise plan  · Do not smoke  Nicotine and other chemicals in cigarettes and cigars can increase anxiety  Ask your healthcare provider for information if you currently smoke and need help to quit  E-cigarettes or smokeless tobacco still contain nicotine  Talk to your healthcare provider before you use these products  · Do not have caffeine  Caffeine can make your symptoms worse  Do not have foods or drinks that are meant to increase your energy level  · Limit or do not drink alcohol  Ask your healthcare provider if alcohol is safe for you  You may not be able to drink alcohol if you take certain anxiety or depression medicines  Limit alcohol to 1 drink per day if you are a woman  Limit alcohol to 2 drinks per day if you are a man  A drink of alcohol is 12 ounces of beer, 5 ounces of wine, or 1½ ounces of liquor  · Do not use drugs  Drugs can make your anxiety worse  It can also make anxiety hard to manage   Talk to your healthcare provider if you use drugs and want help to quit  Follow up with your healthcare provider as directed:  Write down your questions so you remember to ask them during your visits  © 2017 [employer] Information is for End User's use only and may not be sold, redistributed or otherwise used for commercial purposes  All illustrations and images included in CareNotes® are the copyrighted property of [employer]  or [employer]  The above information is an  only  It is not intended as medical advice for individual conditions or treatments  Talk to your doctor, nurse or pharmacist before following any medical regimen to see if it is safe and effective for you

## 2018-09-28 NOTE — PROGRESS NOTES
Routine Follow-up    Kadi Marquez 72 y o  female   Date:  9/28/2018      Assessment and Plan:    Johnny Moore was seen today for follow-up  Diagnoses and all orders for this visit:    Eczema, unspecified type  -     triamcinolone (KENALOG) 0 5 % cream; Apply topically 3 (three) times a day as needed for irritation or rash (itch)    Mixed hyperlipidemia  -     atorvastatin (LIPITOR) 10 mg tablet; Take 1 tablet (10 mg total) by mouth daily    Other depression  -     escitalopram (LEXAPRO) 20 mg tablet; Take 1 tablet (20 mg total) by mouth daily    Gastroesophageal reflux disease without esophagitis  -     esomeprazole (NexIUM) 20 mg capsule; Take 1 capsule (20 mg total) by mouth daily  -     Ambulatory referral to Gastroenterology; Future  - uncontrolled on PPI and H2 blocker on opposite ends of the day     Hypertension, unspecified type  -     losartan-hydrochlorothiazide (HYZAAR) 100-12 5 MG per tablet; Take 1 tablet by mouth daily  - well controlled   - echo normal     Depression with anxiety  -     buPROPion (WELLBUTRIN XL) 150 mg 24 hr tablet; Take 1 tablet (150 mg total) by mouth daily  - trial wellbutrin in addition to lexapro and follow up in 1 month   - defers therapy referral however lacks social support at home     Flu vaccine need  -     influenza vaccine, [id number], high-dose, PF 0 5 mL, for patients 65 yr+ (FLUZONE HIGH-DOSE)            HPI:  Chief Complaint   Patient presents with    Follow-up     labs/test in chart, always itching and anxiety      HPI  Patient is a 71 yo female who presents for routine follow up  Her recent routine labs were stable  Her BP is stable  She has not have any more presyncopal episodes and had negative work up including echo  She reports that she has had several patches of red, itchy skin on arms and trunk  No new skin irritants  She also reports that she continues to be anxious and tense  She has been on lexaro for > 10 years and feels possibly not enough anymore  She uses hobbies like cross-stitch to keep busy  She defers therapy but reports she does not have a lot of social support in her life  ROS: Review of Systems   Constitutional: Negative for chills, fatigue, fever and unexpected weight change  HENT: Negative for congestion, ear pain, hearing loss, nosebleeds, sore throat and trouble swallowing  Eyes: Negative for pain, discharge and visual disturbance  Respiratory: Negative for cough, shortness of breath and wheezing  Cardiovascular: Negative for chest pain, palpitations and leg swelling  Endocrine: Negative for cold intolerance and heat intolerance  Genitourinary: Negative for difficulty urinating, dysuria and hematuria  Musculoskeletal: Negative for arthralgias, gait problem and myalgias  Skin: Positive for rash  Negative for color change and wound  Neurological: Negative for dizziness, syncope, weakness, light-headedness and headaches  Hematological: Negative for adenopathy  Does not bruise/bleed easily  Psychiatric/Behavioral: Negative for confusion and sleep disturbance  The patient is nervous/anxious  No past medical history on file    Patient Active Problem List   Diagnosis    Age-related nuclear cataract, bilateral    Allergic rhinitis    Anemia    Anxiety disorder    Benign essential hypertension    Central sleep apnea    Irritable bowel syndrome with diarrhea    CKD (chronic kidney disease), stage III    Depression with anxiety    Dyslipidemia    Esophagitis, reflux    Hypoproteinemia (HCC)    Loss of height    Mild persistent asthma without complication    Nephrosclerosis    Osteoarthritis of left knee    Osteopenia    Overactive bladder    Primary insomnia    Vitamin B12 deficiency    Vitamin D deficiency    Chondromalacia patellae    Trochanteric bursitis       Past Surgical History:   Procedure Laterality Date    DILATION AND CURETTAGE OF UTERUS      HYSTEROSCOPY      of uterus  d&c 1997    HYSTEROSCOPY W/ POLYPECTOMY      2000 and 2001  resolved    NOSE SURGERY      nasal endoscopy polypectomy    TONSILLECTOMY         Social History     Social History    Marital status: /Civil Union     Spouse name: N/A    Number of children: N/A    Years of education: N/A     Social History Main Topics    Smoking status: Never Smoker    Smokeless tobacco: Never Used    Alcohol use Yes      Comment: social    Drug use: No    Sexual activity: Not Asked     Other Topics Concern    None     Social History Narrative    Uses safety equipment  Seat belt       Family History   Problem Relation Age of Onset    Heart attack Mother     Osteoporosis Mother     Uterine cancer Mother        Allergies   Allergen Reactions    Clarithromycin      Reaction Date: 12Apr2011;     Codeine      Reaction Date: 12Apr2011;     Estradiol      Annotation - [id number]: ELEVATES BP    Hydrocodone     Meloxicam     Tramadol Other (See Comments)     red,itchy    Hydrocodone-Acetaminophen Rash         Current Outpatient Prescriptions:     albuterol (2 5 mg/3 mL) 0 083 % nebulizer solution, Inhale 1 each, Disp: , Rfl:     albuterol (VENTOLIN HFA) 90 mcg/act inhaler, Inhale 2 puffs, Disp: , Rfl:     atorvastatin (LIPITOR) 10 mg tablet, Take 1 tablet (10 mg total) by mouth daily, Disp: 90 tablet, Rfl: 1    Calcium Carbonate (CALTRATE 600) 1500 (600 Ca) MG TABS, Take 1 tablet by mouth 2 (two) times a day, Disp: , Rfl:     Cetirizine HCl (ZYRTEC ALLERGY PO), Take 1 tablet by mouth, Disp: , Rfl:     colesevelam (WELCHOL) 625 mg tablet, Take 3 tablets (1,875 mg total) by mouth 2 (two) times a day with meals, Disp: 540 tablet, Rfl: 1    escitalopram (LEXAPRO) 20 mg tablet, Take 1 tablet (20 mg total) by mouth daily, Disp: 90 tablet, Rfl: 1    esomeprazole (NexIUM) 20 mg capsule, Take 1 capsule (20 mg total) by mouth daily, Disp: 90 capsule, Rfl: 1    fluticasone (FLONASE) 50 mcg/act nasal spray, into each nostril, Disp: , Rfl:     fluticasone-salmeterol (ADVAIR DISKUS) 100-50 mcg/dose inhaler, Inhale 1 puff every 12 (twelve) hours, Disp: 50 Inhaler, Rfl: 0    Iron-Vitamin C (IRON 100/C) 100-250 MG TABS, Take by mouth, Disp: , Rfl:     losartan-hydrochlorothiazide (HYZAAR) 100-12 5 MG per tablet, Take 1 tablet by mouth daily, Disp: 90 tablet, Rfl: 1    Omega-3 Fatty Acids (FISH OIL) 1,000 mg, Take 1 capsule by mouth 2 (two) times a day, Disp: , Rfl:     alendronate (FOSAMAX) 70 mg tablet, Take 1 tablet by mouth once a week, Disp: , Rfl:     buPROPion (WELLBUTRIN XL) 150 mg 24 hr tablet, Take 1 tablet (150 mg total) by mouth daily, Disp: 30 tablet, Rfl: 1    hydrOXYzine HCL (ATARAX) 25 mg tablet, Take by mouth, Disp: , Rfl:     triamcinolone (KENALOG) 0 5 % cream, Apply topically 3 (three) times a day as needed for irritation or rash (itch), Disp: 30 g, Rfl: 1      Physical Exam:  /76 (BP Location: Left arm, Patient Position: Sitting, Cuff Size: Standard)   Pulse 63   Temp 98 °F (36 7 °C) (Tympanic)   Ht 5' 5 75" (1 67 m)   Wt 84 2 kg (185 lb 9 6 oz)   SpO2 92%   BMI 30 18 kg/m²     Physical Exam   Constitutional: She is oriented to person, place, and time  Vital signs are normal  She appears well-developed and well-nourished  No distress  HENT:   Head: Normocephalic and atraumatic  Right Ear: Tympanic membrane, external ear and ear canal normal    Left Ear: Tympanic membrane, external ear and ear canal normal    Nose: Nose normal    Mouth/Throat: Oropharynx is clear and moist    Eyes: Pupils are equal, round, and reactive to light  Conjunctivae and lids are normal    Neck: Trachea normal and normal range of motion  Neck supple  No thyromegaly present  Cardiovascular: Normal rate, regular rhythm, S1 normal, S2 normal and intact distal pulses  Exam reveals no gallop  No murmur heard  Pulmonary/Chest: Breath sounds normal  No respiratory distress  She has no wheezes  She has no rhonchi  She has no rales  Musculoskeletal: Normal range of motion  She exhibits no edema or deformity  Lymphadenopathy:     She has no cervical adenopathy  Neurological: She is alert and oriented to person, place, and time  She has normal reflexes  No cranial nerve deficit or sensory deficit  Skin: Skin is warm and dry  Rash (patches of eczema on arms ) noted  No cyanosis  No pallor  Nails show no clubbing  Psychiatric: She has a normal mood and affect   Her behavior is normal  Cognition and memory are normal          Labs:  Lab Results   Component Value Date    WBC 5 72 09/18/2018    HGB 11 8 09/18/2018    HCT 35 7 09/18/2018    MCV 93 09/18/2018     09/18/2018     Lab Results   Component Value Date     09/18/2018    K 3 4 (L) 09/18/2018     09/18/2018    CO2 31 09/18/2018    BUN 12 09/18/2018    CREATININE 1 09 09/18/2018    GLUF 82 09/18/2018    CALCIUM 8 9 09/18/2018    AST 26 09/18/2018    ALT 37 09/18/2018    ALKPHOS 75 09/18/2018    EGFR 51 09/18/2018

## 2018-11-12 ENCOUNTER — OFFICE VISIT (OUTPATIENT)
Dept: FAMILY MEDICINE CLINIC | Facility: CLINIC | Age: 73
End: 2018-11-12
Payer: MEDICARE

## 2018-11-12 VITALS
SYSTOLIC BLOOD PRESSURE: 112 MMHG | OXYGEN SATURATION: 92 % | WEIGHT: 182.6 LBS | DIASTOLIC BLOOD PRESSURE: 76 MMHG | HEIGHT: 65 IN | TEMPERATURE: 97.8 F | HEART RATE: 74 BPM | BODY MASS INDEX: 30.42 KG/M2

## 2018-11-12 DIAGNOSIS — F41.8 DEPRESSION WITH ANXIETY: ICD-10-CM

## 2018-11-12 DIAGNOSIS — I10 BENIGN ESSENTIAL HYPERTENSION: ICD-10-CM

## 2018-11-12 DIAGNOSIS — E78.5 DYSLIPIDEMIA: Primary | ICD-10-CM

## 2018-11-12 PROCEDURE — 99214 OFFICE O/P EST MOD 30 MIN: CPT | Performed by: PHYSICIAN ASSISTANT

## 2018-11-12 RX ORDER — BUPROPION HYDROCHLORIDE 300 MG/1
300 TABLET ORAL DAILY
Qty: 90 TABLET | Refills: 0 | Status: SHIPPED | OUTPATIENT
Start: 2018-11-12 | End: 2019-03-19 | Stop reason: SDUPTHER

## 2018-11-12 NOTE — PROGRESS NOTES
Routine Follow-up    Roxanne Connor 72 y o  female   Date:  11/12/2018      Assessment and Plan:    Ariella Cho was seen today for follow-up  Diagnoses and all orders for this visit:    Dyslipidemia  -     Lipid panel; Future  - controlled     Depression with anxiety  -     buPROPion (WELLBUTRIN XL) 300 mg 24 hr tablet; Take 1 tablet (300 mg total) by mouth daily  - continue lexapro  - tolerating wellbutrin but will trial increase from 150 and continue to monitor   - no si/hi, defers therapy referral     Benign essential hypertension  -     Comprehensive metabolic panel; Future  - controlled     Follow up in 4 months after repeat labs or sooner prn       HPI:  Chief Complaint   Patient presents with    Follow-up     HPI   Patient is a 73 yo female who presents for 1 month follow up  At least visit, she reported increased nerves despite already being on lexapro  She was started on wellbutrin 150 daily and is tolerating well  She reports that he anxiety has improved, could be better  She also reports that she has a lot more ambition and motivation to do things and reports she may have been depressed without even really knowing it  She feels a lot more herself  She is agreeable to trying 300 mg daily to see if will further help her anxiety  She does not feel the need to try counselling  She has no other concerns  ROS: Review of Systems   Constitutional: Negative  Respiratory: Negative  Cardiovascular: Negative  Gastrointestinal: Negative  Psychiatric/Behavioral: Positive for dysphoric mood (improved, better motivation in day to day )  Negative for behavioral problems, confusion, decreased concentration, hallucinations, self-injury, sleep disturbance and suicidal ideas  The patient is nervous/anxious  The patient is not hyperactive  No past medical history on file    Patient Active Problem List   Diagnosis    Age-related nuclear cataract, bilateral    Allergic rhinitis    Anemia    Anxiety disorder    Benign essential hypertension    Central sleep apnea    Irritable bowel syndrome with diarrhea    CKD (chronic kidney disease), stage III (HCC)    Depression with anxiety    Dyslipidemia    Esophagitis, reflux    Hypoproteinemia (HCC)    Loss of height    Mild persistent asthma without complication    Nephrosclerosis    Osteoarthritis of left knee    Osteopenia    Overactive bladder    Primary insomnia    Vitamin B12 deficiency    Vitamin D deficiency    Chondromalacia patellae    Trochanteric bursitis       Past Surgical History:   Procedure Laterality Date    DILATION AND CURETTAGE OF UTERUS      HYSTEROSCOPY      of uterus  d&c 1997    HYSTEROSCOPY W/ POLYPECTOMY      2000 and 2001  resolved    NOSE SURGERY      nasal endoscopy polypectomy    TONSILLECTOMY         Social History     Social History    Marital status: /Civil Union     Spouse name: N/A    Number of children: N/A    Years of education: N/A     Social History Main Topics    Smoking status: Never Smoker    Smokeless tobacco: Never Used    Alcohol use Yes      Comment: social    Drug use: No    Sexual activity: Not Asked     Other Topics Concern    None     Social History Narrative    Uses safety equipment  Seat belt       Family History   Problem Relation Age of Onset    Heart attack Mother     Osteoporosis Mother     Uterine cancer Mother        Allergies   Allergen Reactions    Clarithromycin      Reaction Date: 12Apr2011;     Codeine      Reaction Date: 12Apr2011;     Estradiol      Annotation - [id number]: ELEVATES BP    Hydrocodone     Meloxicam     Tramadol Other (See Comments)     red,itchy    Hydrocodone-Acetaminophen Rash         Current Outpatient Prescriptions:     albuterol (2 5 mg/3 mL) 0 083 % nebulizer solution, Inhale 1 each, Disp: , Rfl:     albuterol (VENTOLIN HFA) 90 mcg/act inhaler, Inhale 2 puffs, Disp: , Rfl:     atorvastatin (LIPITOR) 10 mg tablet, Take 1 tablet (10 mg total) by mouth daily, Disp: 90 tablet, Rfl: 1    buPROPion (WELLBUTRIN XL) 300 mg 24 hr tablet, Take 1 tablet (300 mg total) by mouth daily, Disp: 90 tablet, Rfl: 0    Calcium Carbonate (CALTRATE 600) 1500 (600 Ca) MG TABS, Take 1 tablet by mouth 2 (two) times a day, Disp: , Rfl:     Cetirizine HCl (ZYRTEC ALLERGY PO), Take 1 tablet by mouth, Disp: , Rfl:     colesevelam (WELCHOL) 625 mg tablet, Take 3 tablets (1,875 mg total) by mouth 2 (two) times a day with meals, Disp: 540 tablet, Rfl: 1    escitalopram (LEXAPRO) 20 mg tablet, Take 1 tablet (20 mg total) by mouth daily, Disp: 90 tablet, Rfl: 1    esomeprazole (NexIUM) 20 mg capsule, Take 1 capsule (20 mg total) by mouth daily, Disp: 90 capsule, Rfl: 1    fluticasone (FLONASE) 50 mcg/act nasal spray, into each nostril, Disp: , Rfl:     fluticasone-salmeterol (ADVAIR DISKUS) 100-50 mcg/dose inhaler, Inhale 1 puff every 12 (twelve) hours, Disp: 50 Inhaler, Rfl: 0    Iron-Vitamin C (IRON 100/C) 100-250 MG TABS, Take by mouth, Disp: , Rfl:     losartan-hydrochlorothiazide (HYZAAR) 100-12 5 MG per tablet, Take 1 tablet by mouth daily, Disp: 90 tablet, Rfl: 1    Omega-3 Fatty Acids (FISH OIL) 1,000 mg, Take 1 capsule by mouth 2 (two) times a day, Disp: , Rfl:       Physical Exam:  /76 (BP Location: Left arm, Patient Position: Sitting, Cuff Size: Standard)   Pulse 74   Temp 97 8 °F (36 6 °C) (Tympanic)   Ht 5' 5" (1 651 m)   Wt 82 8 kg (182 lb 9 6 oz)   SpO2 92%   BMI 30 39 kg/m²     Physical Exam   Constitutional: She is oriented to person, place, and time  She appears well-developed and well-nourished  No distress  HENT:   Head: Normocephalic and atraumatic  Cardiovascular: Normal rate, regular rhythm, normal heart sounds and intact distal pulses  No murmur heard  Pulmonary/Chest: Effort normal and breath sounds normal  No respiratory distress  She has no wheezes  She has no rales     Neurological: She is alert and oriented to person, place, and time  Skin: Skin is warm and dry  No rash noted  Psychiatric: She has a normal mood and affect   Her behavior is normal          Labs:  Lab Results   Component Value Date    WBC 5 72 09/18/2018    HGB 11 8 09/18/2018    HCT 35 7 09/18/2018    MCV 93 09/18/2018     09/18/2018     Lab Results   Component Value Date    K 3 4 (L) 09/18/2018     09/18/2018    CO2 31 09/18/2018    BUN 12 09/18/2018    CREATININE 1 09 09/18/2018    GLUF 82 09/18/2018    CALCIUM 8 9 09/18/2018    AST 26 09/18/2018    ALT 37 09/18/2018    ALKPHOS 75 09/18/2018    EGFR 51 09/18/2018

## 2018-12-19 ENCOUNTER — OFFICE VISIT (OUTPATIENT)
Dept: GASTROENTEROLOGY | Facility: MEDICAL CENTER | Age: 73
End: 2018-12-19
Payer: MEDICARE

## 2018-12-19 VITALS
WEIGHT: 184 LBS | HEART RATE: 75 BPM | SYSTOLIC BLOOD PRESSURE: 124 MMHG | DIASTOLIC BLOOD PRESSURE: 82 MMHG | BODY MASS INDEX: 30.66 KG/M2 | HEIGHT: 65 IN | TEMPERATURE: 97.9 F

## 2018-12-19 DIAGNOSIS — Z12.11 COLON CANCER SCREENING: ICD-10-CM

## 2018-12-19 DIAGNOSIS — K58.0 IRRITABLE BOWEL SYNDROME WITH DIARRHEA: Primary | ICD-10-CM

## 2018-12-19 DIAGNOSIS — K21.9 GASTROESOPHAGEAL REFLUX DISEASE WITHOUT ESOPHAGITIS: ICD-10-CM

## 2018-12-19 PROCEDURE — 99204 OFFICE O/P NEW MOD 45 MIN: CPT | Performed by: INTERNAL MEDICINE

## 2018-12-19 RX ORDER — OMEPRAZOLE 20 MG/1
20 CAPSULE, DELAYED RELEASE ORAL DAILY
Status: ON HOLD | COMMUNITY
End: 2019-02-06

## 2018-12-19 NOTE — PROGRESS NOTES
[facility] - Outpatient Consultation  Sharan Pages 72 y o  female MRN: [id number]  Encounter: [id number]      PCP: Alyx Delvalle PA-C  Referring: Alyx Delvalle PA-C  [address] [place]      ASSESSMENT AND PLAN:      1  Gastroesophageal reflux disease without esophagitis  Discussed anti-reflux measures, and offered handout detailing, including weight loss, avoidance of lying down after meals, recognize/avoid trigger foods, and elevating the head of bed  Plan for EGD to rule out Martini's esophagus given long standing reflux  Continue PPI  - Ambulatory referral to Gastroenterology  - Case request operating room: ESOPHAGOGASTRODUODENOSCOPY (EGD); Standing  - Case request operating room: ESOPHAGOGASTRODUODENOSCOPY (EGD)    2  Irritable bowel syndrome with diarrhea  Symptoms well controlled with Welchol and probiotic daily  3  Colon cancer screening  - Occult Blood, Fecal Immunochemical; Future      ______________________________________________________________________    HPI:      Patient is a 82-year-old female referred for reflux  She has a past medical history of hypertension, asthma, osteoarthritis, CKD stage 3, anxiety  She has a greater than 30 year history of reflux symptoms  Symptoms recurrently occur usually after dinner time, she had been taking Nexium and recently switched to Prilosec  She has started significant lifestyle measures including smaller meals at bedtime, elevating the head of her bed and weight loss that has resulted in significant improvement in her symptoms  She does admit to continued symptoms at least two nights out of the week, but denies any nocturnal awakenings  Symptoms usually occur after dinner time and extended until bedtime  She denies dysphagia, unintentional weight loss      She does have a history of irritable bowel syndrome with diarrhea, she is currently taking Welchol a probiotic daily   This resulted in a bowel movement once daily or every other day with good control of her symptoms  She denies continued episodes of diarrhea that you stool wake her in the middle of the night for at least the last one month  Today, she refuses a colonoscopy  She states her last colonoscopy was greater than 10 years ago  She is concerned about fecal incontinence that will result from a bowel preparation  She is agreeable to colon cancer screening with a stool card testing, and if this is positive she will consider colonoscopy at that time  REVIEW OF SYSTEMS:    CONSTITUTIONAL: Denies any fever, chills, rigors, and weight loss  HEENT: No earache or tinnitus  Denies hearing loss or visual disturbances  CARDIOVASCULAR: No chest pain or palpitations  RESPIRATORY: Denies any cough, hemoptysis, shortness of breath or dyspnea on exertion  GASTROINTESTINAL: As noted in the History of Present Illness  GENITOURINARY: No problems with urination  Denies any hematuria or dysuria  NEUROLOGIC: No dizziness or vertigo, denies headaches  MUSCULOSKELETAL: Denies any muscle or joint pain  SKIN: Denies skin rashes or itching  ENDOCRINE: Denies excessive thirst  Denies intolerance to heat or cold  PSYCHOSOCIAL: Denies depression or anxiety  Denies any recent memory loss  Historical Information   No past medical history on file    Past Surgical History:   Procedure Laterality Date    DILATION AND CURETTAGE OF UTERUS      HYSTEROSCOPY      of uterus  d&c 1997    HYSTEROSCOPY W/ POLYPECTOMY      2000 and 2001  resolved    NOSE SURGERY      nasal endoscopy polypectomy    TONSILLECTOMY       Social History   History   Alcohol Use    Yes     Comment: social     History   Drug Use No     History   Smoking Status    Never Smoker   Smokeless Tobacco    Never Used     Family History   Problem Relation Age of Onset    Heart attack Mother     Osteoporosis Mother     Uterine cancer Mother Meds/Allergies       Current Outpatient Prescriptions:     albuterol (2 5 mg/3 mL) 0 083 % nebulizer solution    albuterol (VENTOLIN HFA) 90 mcg/act inhaler    atorvastatin (LIPITOR) 10 mg tablet    buPROPion (WELLBUTRIN XL) 300 mg 24 hr tablet    Calcium Carbonate (CALTRATE 600) 1500 (600 Ca) MG TABS    Cetirizine HCl (ZYRTEC ALLERGY PO)    colesevelam (WELCHOL) 625 mg tablet    escitalopram (LEXAPRO) 20 mg tablet    fluticasone (FLONASE) 50 mcg/act nasal spray    fluticasone-salmeterol (ADVAIR DISKUS) 100-50 mcg/dose inhaler    Iron-Vitamin C (IRON 100/C) 100-250 MG TABS    losartan-hydrochlorothiazide (HYZAAR) 100-12 5 MG per tablet    Omega-3 Fatty Acids (FISH OIL) 1,000 mg    omeprazole (PriLOSEC) 20 mg delayed release capsule    Allergies   Allergen Reactions    Clarithromycin      Reaction Date: 12Apr2011;     Codeine      Reaction Date: 12Apr2011;     Estradiol      Annotation - [id number]: ELEVATES BP    Hydrocodone     Meloxicam     Tramadol Other (See Comments)     red,itchy    Hydrocodone-Acetaminophen Rash           Objective     Blood pressure 124/82, pulse 75, temperature 97 9 °F (36 6 °C), temperature source Tympanic, height 5' 5" (1 651 m), weight 83 5 kg (184 lb)  Body mass index is 30 62 kg/m²  PHYSICAL EXAM:      General Appearance:   Alert, cooperative, no distress   HEENT:   Normocephalic, atraumatic, anicteric      Neck:  Supple, symmetrical, trachea midline   Lungs:   Clear to auscultation bilaterally; no rales, rhonchi or wheezing; respirations unlabored    Heart[de-identified]   Regular rate and rhythm; no murmur, rub, or gallop     Abdomen:   Soft, non-tender, non-distended; normal bowel sounds; no masses, no organomegaly    Genitalia:   Deferred    Rectal:   Deferred    Extremities:  No cyanosis, clubbing or edema    Pulses:  2+ and symmetric    Skin:  No jaundice, rashes, or lesions    Lymph nodes:  No palpable cervical lymphadenopathy        Lab Results:     Lab Results Component Value Date    WBC 5 72 09/18/2018    HGB 11 8 09/18/2018    HCT 35 7 09/18/2018    MCV 93 09/18/2018     09/18/2018       Lab Results   Component Value Date    K 3 4 (L) 09/18/2018     09/18/2018    CO2 31 09/18/2018    BUN 12 09/18/2018    CREATININE 1 09 09/18/2018    GLUF 82 09/18/2018    CALCIUM 8 9 09/18/2018    AST 26 09/18/2018    ALT 37 09/18/2018    ALKPHOS 75 09/18/2018    EGFR 51 09/18/2018       No results found for: INR, PROTIME      Radiology Results:   No results found

## 2018-12-19 NOTE — LETTER
December 19, 2018     Rogers Rod PA-C  [address] [place] [address]  [phone]    Patient: Ricardo Castro   YOB: 1945   Date of Visit: 12/19/2018       Dear Ms Gomez: Thank you for referring Royer Payton to me for evaluation  Below are my notes for this consultation  If you have questions, please do not hesitate to call me  I look forward to following your patient along with you  Sincerely,        Keely Morales MD        CC: No Recipients  Keely Morales MD  12/19/2018  1:45 PM  Sign at close encounter  [facility] - Outpatient Consultation  Ricardo Castro 72 y o  female MRN: [id number]  Encounter: [id number]      PCP: Rogers Rod PA-C  Referring: Rogers Rod PA-C  [address] [place], [place] [address]  [address] [place] [address]      ASSESSMENT AND PLAN:      1  Gastroesophageal reflux disease without esophagitis  Discussed anti-reflux measures, and offered handout detailing, including weight loss, avoidance of lying down after meals, recognize/avoid trigger foods, and elevating the head of bed  Plan for EGD to rule out Martini's esophagus given long standing reflux  Continue PPI  - Ambulatory referral to Gastroenterology  - Case request operating room: ESOPHAGOGASTRODUODENOSCOPY (EGD); Standing  - Case request operating room: ESOPHAGOGASTRODUODENOSCOPY (EGD)    2  Irritable bowel syndrome with diarrhea  Symptoms well controlled with Welchol and probiotic daily  3  Colon cancer screening  - Occult Blood, Fecal Immunochemical; Future      ______________________________________________________________________    HPI:      Patient is a 82-year-old female referred for reflux  She has a past medical history of hypertension, asthma, osteoarthritis, CKD stage 3, anxiety  She has a greater than 30 year history of reflux symptoms    Symptoms recurrently occur usually after dinner time, she had been taking Nexium and recently switched to Prilosec  She has started significant lifestyle measures including smaller meals at bedtime, elevating the head of her bed and weight loss that has resulted in significant improvement in her symptoms  She does admit to continued symptoms at least two nights out of the week, but denies any nocturnal awakenings  Symptoms usually occur after dinner time and extended until bedtime  She denies dysphagia, unintentional weight loss  She does have a history of irritable bowel syndrome with diarrhea, she is currently taking Welchol a probiotic daily  This resulted in a bowel movement once daily or every other day with good control of her symptoms  She denies continued episodes of diarrhea that you stool wake her in the middle of the night for at least the last one month  Today, she refuses a colonoscopy  She states her last colonoscopy was greater than 10 years ago  She is concerned about fecal incontinence that will result from a bowel preparation  She is agreeable to colon cancer screening with a stool card testing, and if this is positive she will consider colonoscopy at that time  REVIEW OF SYSTEMS:    CONSTITUTIONAL: Denies any fever, chills, rigors, and weight loss  HEENT: No earache or tinnitus  Denies hearing loss or visual disturbances  CARDIOVASCULAR: No chest pain or palpitations  RESPIRATORY: Denies any cough, hemoptysis, shortness of breath or dyspnea on exertion  GASTROINTESTINAL: As noted in the History of Present Illness  GENITOURINARY: No problems with urination  Denies any hematuria or dysuria  NEUROLOGIC: No dizziness or vertigo, denies headaches  MUSCULOSKELETAL: Denies any muscle or joint pain  SKIN: Denies skin rashes or itching  ENDOCRINE: Denies excessive thirst  Denies intolerance to heat or cold  PSYCHOSOCIAL: Denies depression or anxiety  Denies any recent memory loss  Historical Information   No past medical history on file    Past Surgical History:   Procedure Laterality Date    DILATION AND CURETTAGE OF UTERUS      HYSTEROSCOPY      of uterus  d&c 1997    HYSTEROSCOPY W/ POLYPECTOMY      2000 and 2001  resolved    NOSE SURGERY      nasal endoscopy polypectomy    TONSILLECTOMY       Social History   History   Alcohol Use    Yes     Comment: social     History   Drug Use No     History   Smoking Status    Never Smoker   Smokeless Tobacco    Never Used     Family History   Problem Relation Age of Onset    Heart attack Mother     Osteoporosis Mother     Uterine cancer Mother        Meds/Allergies       Current Outpatient Prescriptions:     albuterol (2 5 mg/3 mL) 0 083 % nebulizer solution    albuterol (VENTOLIN HFA) 90 mcg/act inhaler    atorvastatin (LIPITOR) 10 mg tablet    buPROPion (WELLBUTRIN XL) 300 mg 24 hr tablet    Calcium Carbonate (CALTRATE 600) 1500 (600 Ca) MG TABS    Cetirizine HCl (ZYRTEC ALLERGY PO)    colesevelam (WELCHOL) 625 mg tablet    escitalopram (LEXAPRO) 20 mg tablet    fluticasone (FLONASE) 50 mcg/act nasal spray    fluticasone-salmeterol (ADVAIR DISKUS) 100-50 mcg/dose inhaler    Iron-Vitamin C (IRON 100/C) 100-250 MG TABS    losartan-hydrochlorothiazide (HYZAAR) 100-12 5 MG per tablet    Omega-3 Fatty Acids (FISH OIL) 1,000 mg    omeprazole (PriLOSEC) 20 mg delayed release capsule    Allergies   Allergen Reactions    Clarithromycin      Reaction Date: 12Apr2011;     Codeine      Reaction Date: 12Apr2011;     Estradiol      Annotation - [id number]: ELEVATES BP    Hydrocodone     Meloxicam     Tramadol Other (See Comments)     red,itchy    Hydrocodone-Acetaminophen Rash           Objective     Blood pressure 124/82, pulse 75, temperature 97 9 °F (36 6 °C), temperature source Tympanic, height 5' 5" (1 651 m), weight 83 5 kg (184 lb)  Body mass index is 30 62 kg/m²  PHYSICAL EXAM:      General Appearance:   Alert, cooperative, no distress   HEENT:   Normocephalic, atraumatic, anicteric      Neck:  Supple, symmetrical, trachea midline   Lungs:   Clear to auscultation bilaterally; no rales, rhonchi or wheezing; respirations unlabored    Heart[de-identified]   Regular rate and rhythm; no murmur, rub, or gallop  Abdomen:   Soft, non-tender, non-distended; normal bowel sounds; no masses, no organomegaly    Genitalia:   Deferred    Rectal:   Deferred    Extremities:  No cyanosis, clubbing or edema    Pulses:  2+ and symmetric    Skin:  No jaundice, rashes, or lesions    Lymph nodes:  No palpable cervical lymphadenopathy        Lab Results:     Lab Results   Component Value Date    WBC 5 72 09/18/2018    HGB 11 8 09/18/2018    HCT 35 7 09/18/2018    MCV 93 09/18/2018     09/18/2018       Lab Results   Component Value Date    K 3 4 (L) 09/18/2018     09/18/2018    CO2 31 09/18/2018    BUN 12 09/18/2018    CREATININE 1 09 09/18/2018    GLUF 82 09/18/2018    CALCIUM 8 9 09/18/2018    AST 26 09/18/2018    ALT 37 09/18/2018    ALKPHOS 75 09/18/2018    EGFR 51 09/18/2018       No results found for: INR, PROTIME      Radiology Results:   No results found

## 2018-12-19 NOTE — PATIENT INSTRUCTIONS
The patient is scheduled at [facility] on 2/6/19 with Dr Trinna Moritz  Prep was gone over with by the MA  The pt is aware she will be called the day prior with an arrival time

## 2019-01-09 ENCOUNTER — APPOINTMENT (OUTPATIENT)
Dept: LAB | Facility: HOSPITAL | Age: 74
End: 2019-01-09
Payer: MEDICARE

## 2019-01-09 DIAGNOSIS — Z12.11 SCREENING FOR COLON CANCER: ICD-10-CM

## 2019-01-09 LAB — HEMOCCULT STL QL IA: NEGATIVE

## 2019-01-09 PROCEDURE — G0328 FECAL BLOOD SCRN IMMUNOASSAY: HCPCS

## 2019-02-05 ENCOUNTER — ANESTHESIA EVENT (OUTPATIENT)
Dept: GASTROENTEROLOGY | Facility: MEDICAL CENTER | Age: 74
End: 2019-02-05
Payer: MEDICARE

## 2019-02-06 ENCOUNTER — HOSPITAL ENCOUNTER (OUTPATIENT)
Facility: MEDICAL CENTER | Age: 74
Setting detail: OUTPATIENT SURGERY
Discharge: HOME | End: 2019-02-06
Attending: INTERNAL MEDICINE | Admitting: INTERNAL MEDICINE
Payer: MEDICARE

## 2019-02-06 ENCOUNTER — ANESTHESIA (OUTPATIENT)
Dept: GASTROENTEROLOGY | Facility: MEDICAL CENTER | Age: 74
End: 2019-02-06
Payer: MEDICARE

## 2019-02-06 VITALS
SYSTOLIC BLOOD PRESSURE: 138 MMHG | DIASTOLIC BLOOD PRESSURE: 63 MMHG | WEIGHT: 184 LBS | RESPIRATION RATE: 16 BRPM | HEIGHT: 66 IN | OXYGEN SATURATION: 99 % | HEART RATE: 63 BPM | TEMPERATURE: 98.5 F | BODY MASS INDEX: 29.57 KG/M2

## 2019-02-06 DIAGNOSIS — K21.9 GASTROESOPHAGEAL REFLUX DISEASE WITHOUT ESOPHAGITIS: ICD-10-CM

## 2019-02-06 PROCEDURE — 43239 EGD BIOPSY SINGLE/MULTIPLE: CPT | Performed by: INTERNAL MEDICINE

## 2019-02-06 PROCEDURE — 88305 TISSUE EXAM BY PATHOLOGIST: CPT | Performed by: PATHOLOGY

## 2019-02-06 RX ORDER — OMEPRAZOLE 20 MG/1
40 CAPSULE, DELAYED RELEASE ORAL DAILY
Qty: 60 CAPSULE | Refills: 3 | Status: SHIPPED | OUTPATIENT
Start: 2019-02-06 | End: 2019-10-01

## 2019-02-06 RX ORDER — PSEUDOEPHEDRINE HYDROCHLORIDE 30 MG/1
60 TABLET ORAL EVERY 4 HOURS PRN
COMMUNITY
End: 2020-04-07

## 2019-02-06 RX ORDER — PROPOFOL 10 MG/ML
INJECTION, EMULSION INTRAVENOUS AS NEEDED
Status: DISCONTINUED | OUTPATIENT
Start: 2019-02-06 | End: 2019-02-06 | Stop reason: SURG

## 2019-02-06 RX ORDER — SODIUM CHLORIDE 9 MG/ML
125 INJECTION, SOLUTION INTRAVENOUS CONTINUOUS
Status: DISCONTINUED | OUTPATIENT
Start: 2019-02-06 | End: 2019-02-06 | Stop reason: HOSPADM

## 2019-02-06 RX ADMIN — LIDOCAINE HYDROCHLORIDE 20 MG: 20 INJECTION, SOLUTION INTRAVENOUS at 09:48

## 2019-02-06 RX ADMIN — SODIUM CHLORIDE: 0.9 INJECTION, SOLUTION INTRAVENOUS at 09:38

## 2019-02-06 RX ADMIN — PROPOFOL 100 MG: 10 INJECTION, EMULSION INTRAVENOUS at 09:44

## 2019-02-06 RX ADMIN — LIDOCAINE HYDROCHLORIDE 20 MG: 20 INJECTION, SOLUTION INTRAVENOUS at 09:46

## 2019-02-06 RX ADMIN — PROPOFOL 30 MG: 10 INJECTION, EMULSION INTRAVENOUS at 09:49

## 2019-02-06 RX ADMIN — LIDOCAINE HYDROCHLORIDE 40 MG: 20 INJECTION, SOLUTION INTRAVENOUS at 09:44

## 2019-02-06 RX ADMIN — PROPOFOL 30 MG: 10 INJECTION, EMULSION INTRAVENOUS at 09:47

## 2019-02-06 NOTE — OP NOTE
OPERATIVE REPORT  PATIENT NAME: Rosangela Donovan    :  1945  MRN: [id number]  Pt Location: [facility] GI ROOM 01    SURGERY DATE: 2019    Surgeon(s) and Role:     * Ashley Curiel MD - Primary    Preop Diagnosis:  Gastroesophageal reflux disease without esophagitis [K21 9]    Post-Op Diagnosis Codes:     * Gastroesophageal reflux disease without esophagitis [K21 9]    Procedure(s) (LRB):  ESOPHAGOGASTRODUODENOSCOPY (EGD) (N/A)    ESOPHAGOGASTRODUODENOSCOPY    PROCEDURE: EGD    SEDATION: Monitored anesthesia care, check anesthesia records    ASA Class: 3    INDICATIONS: GERD    CONSENT:  Informed consent was obtained for the procedure, including sedation after explaining the risks and benefits of the procedure  Risks including but not limited to bleeding, perforation, infection, and missed lesion  PREPARATION:   Telemetry, pulse oximetry, blood pressure were monitored throughout the procedure  Patient was identified by myself both verbally and by visual inspection of ID band  DESCRIPTION:   Patient was placed in the left lateral decubitus position and was sedated with the above medication  The gastroscope was introduced in to the oropharynx and the esophagus was intubated under direct visualization  Scope was passed down the esophagus up to 2nd part of the duodenum  A careful inspection was made as the gastroscope was withdrawn, including a retroflexed view of the stomach; findings and interventions are described below  FINDINGS:    #1  Esophagus- normal appearing GE junction, biopsies taken  #2  Stomach- antral predominant gastritis, biopsies taken  One diminutive appearing fundic gland polyp, removed via cold forcep biopsy  Small hiatal hernia seen on GE junction retroflexed views  #3  Duodenum- normal  bulb/2nd portion of duodenum, biopsies taken           IMPRESSIONS:      Antral predominant gastritis  Gastric polyp  Small hiatal hernia  Otherwise normal     RECOMMENDATIONS: Follow up pathology  Resume regular diet  Anti-reflux measures  Increase prilosec to 40 mg daily x 1 month to heal gastritis  Discharge home    COMPLICATIONS:  None; patient tolerated the procedure well      SPECIMENS:    ID Type Source Tests Collected by Time Destination   1 : duodenum biopsy  Tissue Duodenum TISSUE EXAM Pearl Duverney, MD 2/6/2019 0198    2 : stomach biopsy Tissue Stomach TISSUE EXAM Pearl Duverney, MD 2/6/2019 0318    3 : stomach polyp (cold forcep) Tissue Polyp, Stomach/Small Intestine TISSUE EXAM Pearl Duverney, MD 2/6/2019 0948    4 : esophageal junction biopsy Tissue Esophagogastric junction TISSUE EXAM Pearl Duverney, MD 2/6/2019 5186        ESTIMATED BLOOD LOSS:  Minimal      SIGNATURE: Pearl Duverney, MD  DATE: February 6, 2019  TIME: 9:55 AM

## 2019-02-06 NOTE — DISCHARGE INSTR - AVS FIRST PAGE
OPERATIVE REPORT  PATIENT NAME: Tej Thomason    :  1945  MRN: [id number]  Pt Location: [facility] GI ROOM 01    SURGERY DATE: 2019    Surgeon(s) and Role:     * Leanna Eldridge MD - Primary    Preop Diagnosis:  Gastroesophageal reflux disease without esophagitis [K21 9]    Post-Op Diagnosis Codes:     * Gastroesophageal reflux disease without esophagitis [K21 9]    Procedure(s) (LRB):  ESOPHAGOGASTRODUODENOSCOPY (EGD) (N/A)    ESOPHAGOGASTRODUODENOSCOPY    PROCEDURE: EGD    SEDATION: Monitored anesthesia care, check anesthesia records    ASA Class: 3    INDICATIONS: GERD    CONSENT:  Informed consent was obtained for the procedure, including sedation after explaining the risks and benefits of the procedure  Risks including but not limited to bleeding, perforation, infection, and missed lesion  PREPARATION:   Telemetry, pulse oximetry, blood pressure were monitored throughout the procedure  Patient was identified by myself both verbally and by visual inspection of ID band  DESCRIPTION:   Patient was placed in the left lateral decubitus position and was sedated with the above medication  The gastroscope was introduced in to the oropharynx and the esophagus was intubated under direct visualization  Scope was passed down the esophagus up to 2nd part of the duodenum  A careful inspection was made as the gastroscope was withdrawn, including a retroflexed view of the stomach; findings and interventions are described below  FINDINGS:    #1  Esophagus- normal appearing GE junction, biopsies taken  #2  Stomach- antral predominant gastritis, biopsies taken  One diminutive appearing fundic gland polyp, removed via cold forcep biopsy  Small hiatal hernia seen on GE junction retroflexed views  #3  Duodenum- normal  bulb/2nd portion of duodenum, biopsies taken           IMPRESSIONS:      Antral predominant gastritis  Gastric polyp  Small hiatal hernia  Otherwise normal     RECOMMENDATIONS: Follow up pathology  Resume regular diet  Anti-reflux measures  Increase prilosec to 40 mg daily x 1 month to heal gastritis  Discharge home    COMPLICATIONS:  None; patient tolerated the procedure well      SPECIMENS:    ID Type Source Tests Collected by Time Destination   1 : duodenum biopsy  Tissue Duodenum TISSUE EXAM Jing Moore MD 2/6/2019 7440    2 : stomach biopsy Tissue Stomach TISSUE EXAM Jing Moore MD 2/6/2019 6446    3 : stomach polyp (cold forcep) Tissue Polyp, Stomach/Small Intestine TISSUE EXAM Jing Moore MD 2/6/2019 0950    4 : esophageal junction biopsy Tissue Esophagogastric junction TISSUE EXAM Jing Moore MD 2/6/2019 1576        ESTIMATED BLOOD LOSS:  Minimal      SIGNATURE: Jing Moore MD  DATE: February 6, 2019  TIME: 9:55 AM

## 2019-02-06 NOTE — H&P
History and Physical -  [facility]  Roxanne Connor 72 y o  female MRN: [id number]                  HPI: Roxanne Connor is a 72y o  year old female who presents for GERD - EGD      REVIEW OF SYSTEMS: Per the HPI, and otherwise unremarkable      Historical Information   Past Medical History:   Diagnosis Date    Asthma     Chronic kidney disease     CKD stage 3    Hyperlipidemia     Hypertension      Past Surgical History:   Procedure Laterality Date    COLONOSCOPY      DILATION AND CURETTAGE OF UTERUS      HYSTEROSCOPY      of uterus  d&c 1997    HYSTEROSCOPY W/ POLYPECTOMY      2000 and 2001  resolved    NOSE SURGERY      nasal endoscopy polypectomy    NOSE SURGERY      TONSILLECTOMY      TOTAL KNEE ARTHROPLASTY Left      Social History   History   Alcohol Use    Yes     Comment: social     History   Drug Use No     History   Smoking Status    Never Smoker   Smokeless Tobacco    Never Used     Family History   Problem Relation Age of Onset    Heart attack Mother     Osteoporosis Mother     Uterine cancer Mother        Meds/Allergies     Prescriptions Prior to Admission   Medication    atorvastatin (LIPITOR) 10 mg tablet    buPROPion (WELLBUTRIN XL) 300 mg 24 hr tablet    Calcium Carbonate (CALTRATE 600) 1500 (600 Ca) MG TABS    Cetirizine HCl (ZYRTEC ALLERGY PO)    colesevelam (WELCHOL) 625 mg tablet    escitalopram (LEXAPRO) 20 mg tablet    fluticasone (FLONASE) 50 mcg/act nasal spray    Iron-Vitamin C (IRON 100/C) 100-250 MG TABS    losartan-hydrochlorothiazide (HYZAAR) 100-12 5 MG per tablet    Omega-3 Fatty Acids (FISH OIL) 1,000 mg    omeprazole (PriLOSEC) 20 mg delayed release capsule    pseudoephedrine (SUDAFED) 30 mg tablet    albuterol (2 5 mg/3 mL) 0 083 % nebulizer solution    albuterol (VENTOLIN HFA) 90 mcg/act inhaler    fluticasone-salmeterol (ADVAIR DISKUS) 100-50 mcg/dose inhaler       Allergies   Allergen Reactions    Clarithromycin Reaction Date: 12Apr2011;     Codeine      Reaction Date: 12Apr2011;     Estradiol      Annotation - [id number]: ELEVATES BP    Hydrocodone     Meloxicam     Tramadol Other (See Comments)     red,itchy    Hydrocodone-Acetaminophen Rash       Objective     Blood pressure 143/67, pulse 64, temperature 98 5 °F (36 9 °C), temperature source Temporal, resp  rate 18, height 5' 6" (1 676 m), weight 83 5 kg (184 lb), SpO2 97 %  PHYSICAL EXAM    Gen: NAD  CV: RRR  CHEST: Clear  ABD: soft, NT/ND  EXT: no edema      ASSESSMENT/PLAN:  This is a 72y o  year old female here for GERD - EGD, and she is stable and optimized for her procedure

## 2019-02-06 NOTE — DISCHARGE INSTRUCTIONS
Gastroesophageal Reflux Disease   WHAT YOU NEED TO KNOW:   What is gastroesophageal reflux disease? Gastroesophageal reflux occurs when acid and food in the stomach back up into the esophagus  Gastroesophageal reflux disease (GERD) is reflux that occurs more than twice a week for a few weeks  It usually causes heartburn and other symptoms  GERD can cause other health problems over time if it is not treated  What causes GERD? GERD often occurs when the lower muscle (sphincter) of the esophagus does not close properly  The sphincter normally opens to let food into the stomach  It then closes to keep food and stomach acid in the stomach  If the sphincter does not close properly, stomach acid and food back up (reflux) into the esophagus  The following may increase your risk for GERD:  · Certain foods such as spicy foods, chocolate, foods that contain caffeine, peppermint, and fried foods    · Hiatal hernia    · Certain medicines such as calcium channel blockers (used to treat high blood pressure), allergy medicines, sedatives, or antidepressants    · Pregnancy or obesity    · Lying down after a meal    · Drinking alcohol or smoking  What are the signs and symptoms of GERD? Heartburn is the most common symptom of GERD  You may feel burning pain in your chest or below the breast bone  This usually occurs after meals and spreads to your neck, jaw, or shoulder  The pain gets better when you change positions  You may also have any of the following:  · Bitter or acid taste in your mouth    · Dry cough    · Trouble swallowing or pain with swallowing    · Hoarseness or sore throat    · Frequent burping or hiccups    · Feeling of fullness soon after you start eating  How is GERD diagnosed? Your healthcare provider will ask about your symptoms and when they started  Tell him or her about other medical conditions you have, your eating habits, and your activities   You may also need any of the following:  · Esophageal pH monitoring  is used to place a small probe inside your esophagus and stomach to check the amount of acid  · An endoscopy  is a procedure used to look at the inside of your esophagus and stomach  An endoscope is a bendable tube with a light and camera on the end  Your healthcare provider may remove a small sample of tissue and send it to a lab for tests  · Upper GI x-rays  are done to take pictures of your stomach and intestines (bowel)  You may be given a chalky liquid to drink before the pictures are taken  This liquid helps your stomach and intestines show up better on the x-rays  · Esophageal manometry  is a test that shows how your esophagus pushes food and fluid to your stomach  It also shows the pressures in your esophagus and stomach  It may show a hiatal hernia  How is GERD treated? · Medicines  are used to decrease stomach acid  Medicine may also be used to help your lower esophageal sphincter and stomach contract (tighten) more  · Surgery  is done to wrap the upper part of the stomach around the esophageal sphincter  This will strengthen the sphincter and prevent reflux  How can I help manage GERD? · Do not have foods or drinks that may increase heartburn  These include chocolate, peppermint, fried or fatty foods, drinks that contain caffeine, or carbonated drinks (soda)  Other foods include spicy foods, onions, tomatoes, and tomato-based foods  Do not have foods or drinks that can irritate your esophagus, such as citrus fruits, juices, and alcohol  · Do not eat large meals  When you eat a lot of food at one time, your stomach needs more acid to digest it  Eat 6 small meals each day instead of 3 large ones, and eat slowly  Do not eat meals 2 to 3 hours before bedtime  · Elevate the head of your bed  Place 6-inch blocks under the head of your bed frame  You may also use more than one pillow under your head and shoulders while you sleep  · Maintain a healthy weight    If you are overweight, weight loss may help relieve symptoms of GERD  · Do not smoke  Smoking weakens the lower esophageal sphincter and increases the risk of GERD  Ask your healthcare provider for information if you currently smoke and need help to quit  E-cigarettes or smokeless tobacco still contain nicotine  Talk to your healthcare provider before you use these products  · Do not wear clothing that is tight around your waist   Tight clothing can put pressure on your stomach and cause or worsen GERD symptoms  When should I seek immediate care? · You feel full and cannot burp or vomit  · You have severe chest pain and sudden trouble breathing  · Your bowel movements are black, bloody, or tarry-looking  · Your vomit looks like coffee grounds or has blood in it  When should I contact my healthcare provider? · You vomit large amounts, or you vomit often  · You have trouble breathing after you vomit  · You have trouble swallowing, or pain with swallowing  · You are losing weight without trying  · Your symptoms get worse or do not improve with treatment  · You have questions or concerns about your condition or care  CARE AGREEMENT:   You have the right to help plan your care  Learn about your health condition and how it may be treated  Discuss treatment options with your caregivers to decide what care you want to receive  You always have the right to refuse treatment  The above information is an  only  It is not intended as medical advice for individual conditions or treatments  Talk to your doctor, nurse or pharmacist before following any medical regimen to see if it is safe and effective for you  © 2017 [employer] Information is for End User's use only and may not be sold, redistributed or otherwise used for commercial purposes   All illustrations and images included in CareNotes® are the copyrighted property of [employer] M [employer]  or [employer]  Gastritis   WHAT YOU NEED TO KNOW:   Gastritis is inflammation or irritation of the lining of your stomach  DISCHARGE INSTRUCTIONS:   Call 911 for any of the following:   · You develop chest pain or shortness of breath  Seek care immediately if:   · You vomit blood  · You have black or bloody bowel movements  · You have severe stomach or back pain  Contact your healthcare provider if:   · You have a fever  · You have new or worsening symptoms, even after treatment  · You have questions or concerns about your condition or care  Medicines:   · Medicines  may be given to help treat a bacterial infection or decrease stomach acid  · Take your medicine as directed  Contact your healthcare provider if you think your medicine is not helping or if you have side effects  Tell him or her if you are allergic to any medicine  Keep a list of the medicines, vitamins, and herbs you take  Include the amounts, and when and why you take them  Bring the list or the pill bottles to follow-up visits  Carry your medicine list with you in case of an emergency  Manage or prevent gastritis:   · Do not smoke  Nicotine and other chemicals in cigarettes and cigars can make your symptoms worse and cause lung damage  Ask your healthcare provider for information if you currently smoke and need help to quit  E-cigarettes or smokeless tobacco still contain nicotine  Talk to your healthcare provider before you use these products  · Do not drink alcohol  Alcohol can prevent healing and make your gastritis worse  Talk to your healthcare provider if you need help to stop drinking  · Do not take NSAIDs or aspirin unless directed  These and similar medicines can cause irritation  If your healthcare provider says it is okay to take NSAIDs, take them with food  · Do not eat foods that cause irritation  Foods such as oranges and salsa can cause burning or pain  Eat a variety of healthy foods  Examples include fruits (not citrus), vegetables, low-fat dairy products, beans, whole-grain breads, and lean meats and fish  Try to eat small meals, and drink water with your meals  Do not eat for at least 3 hours before you go to bed  · Find ways to relax and decrease stress  Stress can increase stomach acid and make gastritis worse  Activities such as yoga, meditation, or listening to music can help you relax  Spend time with friends, or do things you enjoy  Follow up with your healthcare provider as directed: You may need ongoing tests or treatment, or referral to a gastroenterologist  Write down your questions so you remember to ask them during your visits  © 2017 [employer]  Information is for End User's use only and may not be sold, redistributed or otherwise used for commercial purposes  All illustrations and images included in CareNotes® are the copyrighted property of [employer] M [employer]  or [employer]  The above information is an  only  It is not intended as medical advice for individual conditions or treatments  Talk to your doctor, nurse or pharmacist before following any medical regimen to see if it is safe and effective for you  Hiatal Hernia   WHAT YOU NEED TO KNOW:   What is a hiatal hernia? A hiatal hernia is a condition that causes part of your stomach to bulge through the hiatus (small opening) in your diaphragm  The part of the stomach may move up and down, or it may get trapped above the diaphragm  What increases my risk for a hiatal hernia? The exact cause of a hiatal hernia is not known  You may have been born with a large hiatus  The following may increase your risk of a hiatal hernia:  · Obesity    · Older age    · Medical conditions such as diverticulosis or esophagitis    · Previous surgery of the esophagus or stomach or trauma such as from a motor vehicle accident  What are the types of hiatal hernia?    · Type I (sliding hiatal hernia): A portion of the stomach slides in and out of the hiatus  This type is the most common and usually causes gastroesophageal reflux disease (GERD)  GERD occurs when the esophageal sphincter does not close properly and causes acid reflux  The esophageal sphincter is the lower muscle of the esophagus  · Type II (paraesophageal hiatal hernia):  Type II hiatal hernia forms when a part of the stomach squeezes through the hiatus and lies next to the esophagus  · Type III (combined):  Type III hiatal hernia is a combination of a sliding and a paraesophageal hiatal hernia  · Type IV (complex paraesophageal hiatal hernia): The whole stomach, the small and large bowels, spleen, pancreas, or liver is pushed up into the chest   What are the signs and symptoms of a hiatal hernia? The most common symptom is heartburn  This usually occurs after meals and spreads to your neck, jaw, or shoulder  You may have no signs or symptoms, or you may have any of the following:  · Abdominal pain, especially in the area just above your navel    · Bitter or acid taste in your mouth    · Trouble swallowing    · Coughing or hoarseness    · Chest pain or shortness of breath that occurs after eating    · Frequent burping or hiccups    · Uncomfortable feeling of fullness after eating  How is a hiatal hernia diagnosed? · An upper GI series test  includes x-rays of your esophagus, stomach, and your small intestines  It is also called a barium swallow test  You will be given barium (a chalky liquid) to drink before the pictures are taken  This liquid helps your stomach and intestines show up better on the x-rays  An upper GI series can show if you have an ulcer, a blocked intestine, or other problems  · An endoscopy  uses a scope to see the inside of your digestive tract  A scope is a long, bendable tube with a light on the end of it  A camera may be hooked to the scope to take pictures  How is a hiatal hernia treated? Treatment depends on the type of hiatal hernia you have and on your symptoms  You may not need any treatment  You may need any of the following:  · Medicines  may be given to relieve heartburn symptoms  These medicines help to decrease or block stomach acid  You may also be given medicines that help to tighten the esophageal sphincter  · Surgery  may be done when medicines cannot control your symptoms, or other problems are present  Your healthcare provider may also suggest surgery depending on the type of hernia you have  Your healthcare provider can put your stomach back into its normal location  He may make the hiatus (hole) smaller and anchor your stomach in your abdomen  Fundoplication is a surgery that wraps the upper part of the stomach around the esophageal sphincter to strengthen it  How can I manage symptoms? The following nutrition and lifestyle changes may be recommended to relieve symptoms of heartburn  · Avoid foods that make your symptoms worse  These may include spicy foods, fruit juices, alcohol, caffeine, chocolate, and mint  · Eat several small meals during the day  Small meals give your stomach less food to digest     · Avoid lying down and bending forward after you eat  Do not eat meals 2 to 3 hours before bedtime  This decreases your risk for reflux  · Maintain a healthy weight  If you are overweight, weight loss may help relieve your symptoms  · Sleep with your head elevated  at least 6 inches  · Do not smoke  Smoking can increase your symptoms of heartburn  When should I seek immediate care? · You have severe abdominal pain  · You try to vomit but nothing comes out (retching)  · You have severe chest pain and sudden trouble breathing  · Your bowel movements are black or bloody  · Your vomit looks like coffee grounds or has blood in it  When should I contact my healthcare provider? · Your symptoms are getting worse      · You have nausea, and you are vomiting  · You are losing weight without trying  · You have questions or concerns about your condition or care  CARE AGREEMENT:   You have the right to help plan your care  Learn about your health condition and how it may be treated  Discuss treatment options with your caregivers to decide what care you want to receive  You always have the right to refuse treatment  The above information is an  only  It is not intended as medical advice for individual conditions or treatments  Talk to your doctor, nurse or pharmacist before following any medical regimen to see if it is safe and effective for you  © 2017 [address] [facility] Information is for End User's use only and may not be sold, redistributed or otherwise used for commercial purposes  All illustrations and images included in CareNotes® are the copyrighted property of A [employer] A M , Inc  or Alex Parrish

## 2019-02-12 ENCOUNTER — TELEPHONE (OUTPATIENT)
Dept: GASTROENTEROLOGY | Facility: MEDICAL CENTER | Age: 74
End: 2019-02-12

## 2019-02-12 NOTE — TELEPHONE ENCOUNTER
Notes recorded by Jack Jacobson MA on 2/11/2019 at 4:28 PM EST  Called patient lmom to call office      ----- Message from Cynthia Marley MD sent at 2/11/2019  1:09 PM EST -----  Please call patient with pathology results - stomach, small intestinal biopsies normal   Stomach polyp was benign with no potential to turn into colon cancer  Esophagus did show some mild inflammation, and she should continue Prilosec for this

## 2019-02-26 ENCOUNTER — TELEPHONE (OUTPATIENT)
Dept: GASTROENTEROLOGY | Facility: CLINIC | Age: 74
End: 2019-02-26

## 2019-02-26 DIAGNOSIS — K21.9 GASTROESOPHAGEAL REFLUX DISEASE WITHOUT ESOPHAGITIS: Primary | ICD-10-CM

## 2019-02-26 RX ORDER — ESOMEPRAZOLE MAGNESIUM 40 MG/1
40 CAPSULE, DELAYED RELEASE ORAL DAILY
Qty: 30 CAPSULE | Refills: 3 | Status: SHIPPED | OUTPATIENT
Start: 2019-02-26

## 2019-02-26 NOTE — TELEPHONE ENCOUNTER
elvis pt    Pt called to advise the medication omeprazole is not working for her  she has symptoms of gas, upset stomach, unable to eat and her stomach feels like its "on fire"   Please advise if the medication could be changed - [phone]

## 2019-02-26 NOTE — TELEPHONE ENCOUNTER
She is having a lot of stomach burning  No relief with omeprazole  States she can't eat anything  She states she had better control while on Nexium but her insurance wouldn't cover it  Can we please look into a prior auth?   Thanks  Suhail Malhotra

## 2019-03-15 ENCOUNTER — APPOINTMENT (OUTPATIENT)
Dept: LAB | Facility: CLINIC | Age: 74
End: 2019-03-15
Payer: MEDICARE

## 2019-03-15 DIAGNOSIS — I10 BENIGN ESSENTIAL HYPERTENSION: ICD-10-CM

## 2019-03-15 DIAGNOSIS — E78.5 DYSLIPIDEMIA: ICD-10-CM

## 2019-03-15 LAB
ALBUMIN SERPL BCP-MCNC: 3.7 G/DL (ref 3.5–5)
ALP SERPL-CCNC: 81 U/L (ref 46–116)
ALT SERPL W P-5'-P-CCNC: 23 U/L (ref 12–78)
ANION GAP SERPL CALCULATED.3IONS-SCNC: 4 MMOL/L (ref 4–13)
AST SERPL W P-5'-P-CCNC: 17 U/L (ref 5–45)
BILIRUB SERPL-MCNC: 0.49 MG/DL (ref 0.2–1)
BUN SERPL-MCNC: 13 MG/DL (ref 5–25)
CALCIUM SERPL-MCNC: 9 MG/DL (ref 8.3–10.1)
CHLORIDE SERPL-SCNC: 103 MMOL/L (ref 100–108)
CHOLEST SERPL-MCNC: 156 MG/DL (ref 50–200)
CO2 SERPL-SCNC: 32 MMOL/L (ref 21–32)
CREAT SERPL-MCNC: 1.19 MG/DL (ref 0.6–1.3)
GFR SERPL CREATININE-BSD FRML MDRD: 46 ML/MIN/1.73SQ M
GLUCOSE P FAST SERPL-MCNC: 85 MG/DL (ref 65–99)
HDLC SERPL-MCNC: 60 MG/DL (ref 40–60)
LDLC SERPL CALC-MCNC: 79 MG/DL (ref 0–100)
NONHDLC SERPL-MCNC: 96 MG/DL
POTASSIUM SERPL-SCNC: 3.8 MMOL/L (ref 3.5–5.3)
PROT SERPL-MCNC: 6.9 G/DL (ref 6.4–8.2)
SODIUM SERPL-SCNC: 139 MMOL/L (ref 136–145)
TRIGL SERPL-MCNC: 85 MG/DL

## 2019-03-15 PROCEDURE — 36415 COLL VENOUS BLD VENIPUNCTURE: CPT

## 2019-03-15 PROCEDURE — 80061 LIPID PANEL: CPT

## 2019-03-15 PROCEDURE — 80053 COMPREHEN METABOLIC PANEL: CPT

## 2019-03-19 ENCOUNTER — OFFICE VISIT (OUTPATIENT)
Dept: FAMILY MEDICINE CLINIC | Facility: CLINIC | Age: 74
End: 2019-03-19
Payer: MEDICARE

## 2019-03-19 VITALS
SYSTOLIC BLOOD PRESSURE: 126 MMHG | RESPIRATION RATE: 17 BRPM | WEIGHT: 177 LBS | TEMPERATURE: 98.8 F | BODY MASS INDEX: 29.49 KG/M2 | HEIGHT: 65 IN | OXYGEN SATURATION: 96 % | DIASTOLIC BLOOD PRESSURE: 80 MMHG | HEART RATE: 98 BPM

## 2019-03-19 DIAGNOSIS — F32.89 OTHER DEPRESSION: ICD-10-CM

## 2019-03-19 DIAGNOSIS — F41.8 DEPRESSION WITH ANXIETY: ICD-10-CM

## 2019-03-19 DIAGNOSIS — E78.2 MIXED HYPERLIPIDEMIA: ICD-10-CM

## 2019-03-19 DIAGNOSIS — I10 HYPERTENSION, UNSPECIFIED TYPE: ICD-10-CM

## 2019-03-19 PROCEDURE — 99214 OFFICE O/P EST MOD 30 MIN: CPT | Performed by: PHYSICIAN ASSISTANT

## 2019-03-19 RX ORDER — LOSARTAN POTASSIUM AND HYDROCHLOROTHIAZIDE 12.5; 1 MG/1; MG/1
1 TABLET ORAL DAILY
Qty: 90 TABLET | Refills: 1 | Status: SHIPPED | OUTPATIENT
Start: 2019-03-19 | End: 2019-10-01 | Stop reason: SDUPTHER

## 2019-03-19 RX ORDER — ATORVASTATIN CALCIUM 10 MG/1
10 TABLET, FILM COATED ORAL DAILY
Qty: 90 TABLET | Refills: 1 | Status: SHIPPED | OUTPATIENT
Start: 2019-03-19 | End: 2019-10-01 | Stop reason: SDUPTHER

## 2019-03-19 RX ORDER — BUPROPION HYDROCHLORIDE 150 MG/1
150 TABLET ORAL DAILY
Qty: 90 TABLET | Refills: 1 | Status: SHIPPED | OUTPATIENT
Start: 2019-03-19 | End: 2019-10-01 | Stop reason: SDUPTHER

## 2019-03-19 RX ORDER — ESCITALOPRAM OXALATE 20 MG/1
20 TABLET ORAL DAILY
Qty: 90 TABLET | Refills: 1 | Status: SHIPPED | OUTPATIENT
Start: 2019-03-19 | End: 2019-10-01 | Stop reason: SDUPTHER

## 2019-03-19 NOTE — PROGRESS NOTES
Routine Follow-up    Raghav Garcia 72 y o  female   Date:  3/19/2019      Assessment and Plan:    Bhupinder Workman was seen today for follow-up, cold like symptoms and cough  Diagnoses and all orders for this visit:    Mixed hyperlipidemia  -     atorvastatin (LIPITOR) 10 mg tablet; Take 1 tablet (10 mg total) by mouth daily    Depression with anxiety  -     buPROPion (WELLBUTRIN XL) 150 mg 24 hr tablet; Take 1 tablet (150 mg total) by mouth daily    Other depression  -     escitalopram (LEXAPRO) 20 mg tablet; Take 1 tablet (20 mg total) by mouth daily    Hypertension, unspecified type  -     losartan-hydrochlorothiazide (HYZAAR) 100-12 5 MG per tablet; Take 1 tablet by mouth daily  -     Comprehensive metabolic panel; Future              HPI:  Chief Complaint   Patient presents with    Follow-up     review labs    Cold Like Symptoms    Cough     HPI   Patient is a 59-year-old female who presents for four-month follow-up  She is feeling well  She did see GI specialist since seeing her last and underwent EGD  She was placed on Prilosec but felt like it was not working so is currently taking over-the-counter Nexium and Pepcid at night  She did start with a cold over the weekend and is treating supportively she is not having any fevers or difficulty breathing or flare-up of her asthma  Her recent blood work was stable and she is feeling well on Lexapro and Wellbutrin 150  She has no other concerns    ROS: Review of Systems   Constitutional: Negative  HENT: Positive for congestion and rhinorrhea  Negative for ear pain, sore throat and trouble swallowing  Respiratory: Positive for cough  Negative for chest tightness, shortness of breath and wheezing  Cardiovascular: Negative  Gastrointestinal: Negative  GERD   Genitourinary: Negative  Skin: Negative  Neurological: Negative  Psychiatric/Behavioral: Negative          Past Medical History:   Diagnosis Date    Asthma     Chronic kidney disease     CKD stage 3    Hyperlipidemia     Hypertension      Patient Active Problem List   Diagnosis    Age-related nuclear cataract, bilateral    Allergic rhinitis    Anemia    Anxiety disorder    Benign essential hypertension    Central sleep apnea    Irritable bowel syndrome with diarrhea    CKD (chronic kidney disease), stage III (HCC)    Depression with anxiety    Dyslipidemia    Hypoproteinemia (HCC)    Loss of height    Mild persistent asthma without complication    Nephrosclerosis    Osteoarthritis of left knee    Osteopenia    Overactive bladder    Primary insomnia    Vitamin B12 deficiency    Vitamin D deficiency    Chondromalacia patellae    Trochanteric bursitis    Gastroesophageal reflux disease without esophagitis       Past Surgical History:   Procedure Laterality Date    COLONOSCOPY      DILATION AND CURETTAGE OF UTERUS      HYSTEROSCOPY      of uterus  d&c 1997    HYSTEROSCOPY W/ POLYPECTOMY      2000 and 2001  resolved    NOSE SURGERY      nasal endoscopy polypectomy    NOSE SURGERY      OK ESOPHAGOGASTRODUODENOSCOPY TRANSORAL DIAGNOSTIC N/A 2/6/2019    Procedure: ESOPHAGOGASTRODUODENOSCOPY (EGD); Surgeon: Alondra Gill MD;  Location: [facility];   Service: Gastroenterology    TONSILLECTOMY      TOTAL KNEE ARTHROPLASTY Left        Social History     Socioeconomic History    Marital status: /Civil Union     Spouse name: None    Number of children: None    Years of education: None    Highest education level: None   Occupational History    None   Social Needs    Financial resource strain: None    Food insecurity:     Worry: None     Inability: None    Transportation needs:     Medical: None     Non-medical: None   Tobacco Use    Smoking status: Never Smoker    Smokeless tobacco: Never Used   Substance and Sexual Activity    Alcohol use: Yes     Comment: social    Drug use: No    Sexual activity: None   Lifestyle    Physical activity:     Days per week: None     Minutes per session: None    Stress: None   Relationships    Social connections:     Talks on phone: None     Gets together: None     Attends Ca[employer] service: None     Active member of club or organization: None     Attends meetings of clubs or organizations: None     Relationship status: None    Intimate partner violence:     Fear of current or ex partner: None     Emotionally abused: None     Physically abused: None     Forced sexual activity: None   Other Topics Concern    None   Social History Narrative    Uses safety equipment  Seat belt       Family History   Problem Relation Age of Onset    Heart attack Mother     Osteoporosis Mother     Uterine cancer Mother        Allergies   Allergen Reactions    Clarithromycin      Reaction Date: 12Apr2011;     Codeine      Reaction Date: 12Apr2011;     Estradiol      Annotation - [id number]: ELEVATES BP    Hydrocodone     Meloxicam     Tramadol Other (See Comments)     red,itchy    Hydrocodone-Acetaminophen Rash         Current Outpatient Medications:     albuterol (2 5 mg/3 mL) 0 083 % nebulizer solution, Inhale 1 each, Disp: , Rfl:     albuterol (VENTOLIN HFA) 90 mcg/act inhaler, Inhale 2 puffs, Disp: , Rfl:     atorvastatin (LIPITOR) 10 mg tablet, Take 1 tablet (10 mg total) by mouth daily, Disp: 90 tablet, Rfl: 1    buPROPion (WELLBUTRIN XL) 150 mg 24 hr tablet, Take 1 tablet (150 mg total) by mouth daily, Disp: 90 tablet, Rfl: 1    Calcium Carbonate (CALTRATE 600) 1500 (600 Ca) MG TABS, Take 1 tablet by mouth 2 (two) times a day, Disp: , Rfl:     Cetirizine HCl (ZYRTEC ALLERGY PO), Take 1 tablet by mouth, Disp: , Rfl:     colesevelam (WELCHOL) 625 mg tablet, Take 3 tablets (1,875 mg total) by mouth 2 (two) times a day with meals, Disp: 540 tablet, Rfl: 1    escitalopram (LEXAPRO) 20 mg tablet, Take 1 tablet (20 mg total) by mouth daily, Disp: 90 tablet, Rfl: 1    esomeprazole (NexIUM) 40 MG capsule, Take 1 capsule (40 mg total) by mouth daily, Disp: 30 capsule, Rfl: 3    fluticasone (FLONASE) 50 mcg/act nasal spray, into each nostril, Disp: , Rfl:     fluticasone-salmeterol (ADVAIR DISKUS) 100-50 mcg/dose inhaler, Inhale 1 puff every 12 (twelve) hours, Disp: 50 Inhaler, Rfl: 0    Iron-Vitamin C (IRON 100/C) 100-250 MG TABS, Take by mouth, Disp: , Rfl:     losartan-hydrochlorothiazide (HYZAAR) 100-12 5 MG per tablet, Take 1 tablet by mouth daily, Disp: 90 tablet, Rfl: 1    Omega-3 Fatty Acids (FISH OIL) 1,000 mg, Take 1 capsule by mouth 2 (two) times a day, Disp: , Rfl:     omeprazole (PriLOSEC) 20 mg delayed release capsule, Take 2 capsules (40 mg total) by mouth daily, Disp: 60 capsule, Rfl: 3    pseudoephedrine (SUDAFED) 30 mg tablet, Take 60 mg by mouth every 4 (four) hours as needed for congestion, Disp: , Rfl:       Physical Exam:  /80   Pulse 98   Temp 98 8 °F (37 1 °C)   Resp 17   Ht 5' 4 57" (1 64 m)   Wt 80 3 kg (177 lb)   SpO2 96%   BMI 29 85 kg/m²     Physical Exam   Constitutional: She is oriented to person, place, and time  Vital signs are normal  She appears well-developed and well-nourished  No distress  HENT:   Head: Normocephalic and atraumatic  Right Ear: Tympanic membrane, external ear and ear canal normal    Left Ear: Tympanic membrane, external ear and ear canal normal    Nose: Mucosal edema present  Mouth/Throat: Oropharynx is clear and moist    Eyes: Pupils are equal, round, and reactive to light  Conjunctivae and lids are normal    Neck: Trachea normal and normal range of motion  Neck supple  No thyromegaly present  Cardiovascular: Normal rate, regular rhythm, S1 normal, S2 normal and intact distal pulses  Exam reveals no gallop  No murmur heard  Pulmonary/Chest: Breath sounds normal  No respiratory distress  She has no wheezes  She has no rhonchi  She has no rales  Musculoskeletal: Normal range of motion  She exhibits no edema or deformity     Lymphadenopathy: She has no cervical adenopathy  Neurological: She is alert and oriented to person, place, and time  She has normal reflexes  No cranial nerve deficit or sensory deficit  Skin: Skin is warm and dry  No rash noted  No cyanosis  No pallor  Nails show no clubbing  Psychiatric: She has a normal mood and affect   Her behavior is normal  Cognition and memory are normal          Labs:  Lab Results   Component Value Date    WBC 5 72 09/18/2018    HGB 11 8 09/18/2018    HCT 35 7 09/18/2018    MCV 93 09/18/2018     09/18/2018     Lab Results   Component Value Date    K 3 8 03/15/2019     03/15/2019    CO2 32 03/15/2019    BUN 13 03/15/2019    CREATININE 1 19 03/15/2019    GLUF 85 03/15/2019    CALCIUM 9 0 03/15/2019    AST 17 03/15/2019    ALT 23 03/15/2019    ALKPHOS 81 03/15/2019    EGFR 46 03/15/2019

## 2019-05-20 ENCOUNTER — OFFICE VISIT (OUTPATIENT)
Dept: FAMILY MEDICINE CLINIC | Facility: CLINIC | Age: 74
End: 2019-05-20
Payer: MEDICARE

## 2019-05-20 VITALS
RESPIRATION RATE: 17 BRPM | WEIGHT: 174 LBS | DIASTOLIC BLOOD PRESSURE: 60 MMHG | SYSTOLIC BLOOD PRESSURE: 116 MMHG | HEART RATE: 67 BPM | TEMPERATURE: 98.3 F | HEIGHT: 65 IN | BODY MASS INDEX: 28.99 KG/M2

## 2019-05-20 DIAGNOSIS — Z00.00 MEDICARE ANNUAL WELLNESS VISIT, SUBSEQUENT: ICD-10-CM

## 2019-05-20 DIAGNOSIS — Z11.59 NEED FOR HEPATITIS C SCREENING TEST: ICD-10-CM

## 2019-05-20 DIAGNOSIS — Z12.39 BREAST CANCER SCREENING: Primary | ICD-10-CM

## 2019-05-20 PROCEDURE — G0439 PPPS, SUBSEQ VISIT: HCPCS | Performed by: PHYSICIAN ASSISTANT

## 2019-09-18 ENCOUNTER — TRANSCRIBE ORDERS (OUTPATIENT)
Dept: LAB | Facility: CLINIC | Age: 74
End: 2019-09-18

## 2019-09-18 ENCOUNTER — APPOINTMENT (OUTPATIENT)
Dept: LAB | Facility: CLINIC | Age: 74
End: 2019-09-18
Payer: MEDICARE

## 2019-09-18 DIAGNOSIS — I10 HYPERTENSION, UNSPECIFIED TYPE: ICD-10-CM

## 2019-09-18 DIAGNOSIS — Z11.59 NEED FOR HEPATITIS C SCREENING TEST: ICD-10-CM

## 2019-09-18 LAB
ALBUMIN SERPL BCP-MCNC: 3.9 G/DL (ref 3.5–5)
ALP SERPL-CCNC: 86 U/L (ref 46–116)
ALT SERPL W P-5'-P-CCNC: 24 U/L (ref 12–78)
ANION GAP SERPL CALCULATED.3IONS-SCNC: 4 MMOL/L (ref 4–13)
AST SERPL W P-5'-P-CCNC: 16 U/L (ref 5–45)
BILIRUB SERPL-MCNC: 0.56 MG/DL (ref 0.2–1)
BUN SERPL-MCNC: 16 MG/DL (ref 5–25)
CALCIUM SERPL-MCNC: 9.5 MG/DL (ref 8.3–10.1)
CHLORIDE SERPL-SCNC: 102 MMOL/L (ref 100–108)
CO2 SERPL-SCNC: 31 MMOL/L (ref 21–32)
CREAT SERPL-MCNC: 1.15 MG/DL (ref 0.6–1.3)
GFR SERPL CREATININE-BSD FRML MDRD: 48 ML/MIN/1.73SQ M
GLUCOSE P FAST SERPL-MCNC: 85 MG/DL (ref 65–99)
HCV AB SER QL: NORMAL
POTASSIUM SERPL-SCNC: 3.5 MMOL/L (ref 3.5–5.3)
PROT SERPL-MCNC: 7.3 G/DL (ref 6.4–8.2)
SODIUM SERPL-SCNC: 137 MMOL/L (ref 136–145)

## 2019-09-18 PROCEDURE — 80053 COMPREHEN METABOLIC PANEL: CPT

## 2019-09-18 PROCEDURE — 36415 COLL VENOUS BLD VENIPUNCTURE: CPT

## 2019-09-18 PROCEDURE — 86803 HEPATITIS C AB TEST: CPT

## 2019-09-27 ENCOUNTER — OFFICE VISIT (OUTPATIENT)
Dept: SLEEP CENTER | Facility: CLINIC | Age: 74
End: 2019-09-27
Payer: MEDICARE

## 2019-09-27 VITALS
HEART RATE: 68 BPM | DIASTOLIC BLOOD PRESSURE: 70 MMHG | WEIGHT: 179 LBS | SYSTOLIC BLOOD PRESSURE: 124 MMHG | BODY MASS INDEX: 29.82 KG/M2 | HEIGHT: 65 IN

## 2019-09-27 DIAGNOSIS — G47.33 OSA (OBSTRUCTIVE SLEEP APNEA): Primary | ICD-10-CM

## 2019-09-27 PROCEDURE — 99213 OFFICE O/P EST LOW 20 MIN: CPT | Performed by: INTERNAL MEDICINE

## 2019-09-27 NOTE — PROGRESS NOTES
Progress Note - Joao [address] :1945 MRN: [id number]      Reason for Visit:  69 y  o female here for annual follow-up    Assessment:  Doing well on current therapy of CPAP 14 cm for moderate MAITE (AHI = 24)  Plan:  Continue same  Try F 30 interface  Follow up: One year    History of Present Illness:  History of MAITE on PAP therapy  Fully compliant and deriving benefit  Review of Systems      Genitourinary need to urinate more than twice a night   Cardiology none   Gastrointestinal frequent heartburn/acid reflux   Neurology none   Constitutional none   Integumentary rash or dry skin   Psychiatry none   Musculoskeletal joint pain and muscle aches   Pulmonary none   ENT none   Endocrine none   Hematological none         I have reviewed and updated the review of systems as necessary      Historical Information    Past Medical History:   Past Medical History:   Diagnosis Date    Asthma     Chronic kidney disease     CKD stage 3    Hyperlipidemia     Hypertension          Past Surgical History:   Past Surgical History:   Procedure Laterality Date    COLONOSCOPY      DILATION AND CURETTAGE OF UTERUS      HYSTEROSCOPY      of uterus  d&c     HYSTEROSCOPY W/ POLYPECTOMY       and   resolved    NOSE SURGERY      nasal endoscopy polypectomy    NOSE SURGERY      [place] ESOPHAGOGASTRODUODENOSCOPY TRANSORAL DIAGNOSTIC N/A 2019    Procedure: ESOPHAGOGASTRODUODENOSCOPY (EGD); Surgeon: Yannick Boyd MD;  Location: [facility];   Service: Gastroenterology    TONSILLECTOMY      TOTAL KNEE ARTHROPLASTY Left        Social History:   Social History     Socioeconomic History    Marital status: /Civil Union     Spouse name: None    Number of children: None    Years of education: None    Highest education level: None   Occupational History    None   Social Needs    Financial resource strain: None    Food insecurity:     Worry: None     Inability: None    Transportation needs:     Medical: None     Non-medical: None   Tobacco Use    Smoking status: Never Smoker    Smokeless tobacco: Never Used   Substance and Sexual Activity    Alcohol use: Yes     Comment: social    Drug use: No    Sexual activity: None   Lifestyle    Physical activity:     Days per week: None     Minutes per session: None    Stress: None   Relationships    Social connections:     Talks on phone: None     Gets together: None     Attends Pentecostalism service: None     Active member of club or organization: None     Attends meetings of clubs or organizations: None     Relationship status: None    Intimate partner violence:     Fear of current or ex partner: None     Emotionally abused: None     Physically abused: None     Forced sexual activity: None   Other Topics Concern    None   Social History Narrative    Uses safety equipment  Seat belt       Family History:   Family History   Problem Relation Age of Onset    Heart attack Mother     Osteoporosis Mother     Uterine cancer Mother        Medications/Allergies:      Current Outpatient Medications:     albuterol (2 5 mg/3 mL) 0 083 % nebulizer solution, Inhale 1 each, Disp: , Rfl:     albuterol (VENTOLIN HFA) 90 mcg/act inhaler, Inhale 2 puffs, Disp: , Rfl:     atorvastatin (LIPITOR) 10 mg tablet, Take 1 tablet (10 mg total) by mouth daily, Disp: 90 tablet, Rfl: 1    buPROPion (WELLBUTRIN XL) 150 mg 24 hr tablet, Take 1 tablet (150 mg total) by mouth daily, Disp: 90 tablet, Rfl: 1    Calcium Carbonate (CALTRATE 600) 1500 (600 Ca) MG TABS, Take 1 tablet by mouth 2 (two) times a day, Disp: , Rfl:     Cetirizine HCl (ZYRTEC ALLERGY PO), Take 1 tablet by mouth, Disp: , Rfl:     colesevelam (WELCHOL) 625 mg tablet, Take 3 tablets (1,875 mg total) by mouth 2 (two) times a day with meals, Disp: 540 tablet, Rfl: 1    escitalopram (LEXAPRO) 20 mg tablet, Take 1 tablet (20 mg total) by mouth daily, Disp: 90 tablet, Rfl: 1    esomeprazole (NexIUM) 40 MG capsule, Take 1 capsule (40 mg total) by mouth daily, Disp: 30 capsule, Rfl: 3    fluticasone (FLONASE) 50 mcg/act nasal spray, into each nostril, Disp: , Rfl:     fluticasone-salmeterol (ADVAIR DISKUS) 100-50 mcg/dose inhaler, Inhale 1 puff every 12 (twelve) hours, Disp: 50 Inhaler, Rfl: 0    Iron-Vitamin C (IRON 100/C) 100-250 MG TABS, Take by mouth, Disp: , Rfl:     losartan-hydrochlorothiazide (HYZAAR) 100-12 5 MG per tablet, Take 1 tablet by mouth daily, Disp: 90 tablet, Rfl: 1    Omega-3 Fatty Acids (FISH OIL) 1,000 mg, Take 1 capsule by mouth 2 (two) times a day, Disp: , Rfl:     omeprazole (PriLOSEC) 20 mg delayed release capsule, Take 2 capsules (40 mg total) by mouth daily, Disp: 60 capsule, Rfl: 3    pseudoephedrine (SUDAFED) 30 mg tablet, Take 60 mg by mouth every 4 (four) hours as needed for congestion, Disp: , Rfl:           Objective      Vital Signs:   Vitals:    09/27/19 1300   BP: 124/70   Pulse: 68     [place] Sleepiness Scale: Total score: 5        Physical Exam:    General: Alert, appropriate, cooperative, overweight    Head: NC/AT    Skin: Warm, dry    Neuro: No motor abnormalities, cranial nerves appear intact    Extremity: No clubbing, cyanosis      DME Provider: [facility] [employer]        Counseling / Coordination of Care   I have spent 15 minutes with the patient today in which greater than 50% of this time was spent in counseling/coordination of care regarding: equipment and compliance  Board Certified Sleep Specialist    Portions of the record may have been created with voice recognition software  Occasional wrong word or "sound a like" substitutions may have occurred due to the inherent limitations of voice recognition software  Read the chart carefully and recognize, using context, where substitutions have occurred

## 2019-10-01 ENCOUNTER — OFFICE VISIT (OUTPATIENT)
Dept: FAMILY MEDICINE CLINIC | Facility: CLINIC | Age: 74
End: 2019-10-01
Payer: MEDICARE

## 2019-10-01 VITALS
BODY MASS INDEX: 29.16 KG/M2 | TEMPERATURE: 97.9 F | WEIGHT: 175 LBS | SYSTOLIC BLOOD PRESSURE: 120 MMHG | OXYGEN SATURATION: 95 % | DIASTOLIC BLOOD PRESSURE: 82 MMHG | HEART RATE: 86 BPM | HEIGHT: 65 IN | RESPIRATION RATE: 17 BRPM

## 2019-10-01 DIAGNOSIS — E78.2 MIXED HYPERLIPIDEMIA: ICD-10-CM

## 2019-10-01 DIAGNOSIS — Z23 FLU VACCINE NEED: Primary | ICD-10-CM

## 2019-10-01 DIAGNOSIS — I10 HYPERTENSION, UNSPECIFIED TYPE: ICD-10-CM

## 2019-10-01 DIAGNOSIS — F32.89 OTHER DEPRESSION: ICD-10-CM

## 2019-10-01 DIAGNOSIS — F41.8 DEPRESSION WITH ANXIETY: ICD-10-CM

## 2019-10-01 PROCEDURE — 90662 IIV NO PRSV INCREASED AG IM: CPT

## 2019-10-01 PROCEDURE — 99214 OFFICE O/P EST MOD 30 MIN: CPT | Performed by: PHYSICIAN ASSISTANT

## 2019-10-01 PROCEDURE — G0008 ADMIN INFLUENZA VIRUS VAC: HCPCS

## 2019-10-01 RX ORDER — ESCITALOPRAM OXALATE 20 MG/1
20 TABLET ORAL DAILY
Qty: 90 TABLET | Refills: 1 | Status: SHIPPED | OUTPATIENT
Start: 2019-10-01 | End: 2020-04-10 | Stop reason: SDUPTHER

## 2019-10-01 RX ORDER — LOSARTAN POTASSIUM AND HYDROCHLOROTHIAZIDE 12.5; 1 MG/1; MG/1
1 TABLET ORAL DAILY
Qty: 90 TABLET | Refills: 1 | Status: SHIPPED | OUTPATIENT
Start: 2019-10-01 | End: 2020-04-10 | Stop reason: SDUPTHER

## 2019-10-01 RX ORDER — BUPROPION HYDROCHLORIDE 150 MG/1
150 TABLET ORAL DAILY
Qty: 90 TABLET | Refills: 1 | Status: SHIPPED | OUTPATIENT
Start: 2019-10-01 | End: 2020-04-10 | Stop reason: SDUPTHER

## 2019-10-01 RX ORDER — ATORVASTATIN CALCIUM 10 MG/1
10 TABLET, FILM COATED ORAL DAILY
Qty: 90 TABLET | Refills: 1 | Status: SHIPPED | OUTPATIENT
Start: 2019-10-01 | End: 2020-04-10 | Stop reason: SDUPTHER

## 2019-10-01 NOTE — PROGRESS NOTES
Routine Follow-up    Hayden Martinez 69 y o  female   Date:  10/1/2019    Assessment and Plan:    Thomas Batista was seen today for follow-up and flu vaccine  Diagnoses and all orders for this visit:    Flu vaccine need  -     influenza vaccine, [id number], high-dose, PF 0 5 mL (FLUZONE HIGH-DOSE)    Mixed hyperlipidemia  -     atorvastatin (LIPITOR) 10 mg tablet; Take 1 tablet (10 mg total) by mouth daily  -     Lipid panel; Future    Depression with anxiety  -     buPROPion (WELLBUTRIN XL) 150 mg 24 hr tablet; Take 1 tablet (150 mg total) by mouth daily    Other depression  -     escitalopram (LEXAPRO) 20 mg tablet; Take 1 tablet (20 mg total) by mouth daily    Hypertension, unspecified type  -     losartan-hydrochlorothiazide (HYZAAR) 100-12 5 MG per tablet; Take 1 tablet by mouth daily  -     Comprehensive metabolic panel; Future      Please call/return if chest symptoms return and monitor for associated palpations, sob, sweating, nausea  She wishes to just monitor see if it returns before obtaining cardiac workup  HPI:  Chief Complaint   Patient presents with    Follow-up    Flu Vaccine     HPI   Patient is a 68 yo female who presents for routine follow up  She feels stable with Wellbutrin and lexapro at current doses  Her BP is weill controlled  She is taking ale and probiotic and her BMs have been regular  She has not been needing to take welchol for diarrhea  She continues nexium otc with good control  She has had pneumovax before, we do not have record of it  Ever once in awhile she notices a twinge in her L chest twice  It happens when she at rest  It is really brief and then goes away  No lightheadedness, diaphroesis, nausea, palpitations, sob with it  She had carried a bag of potatos, 50 lb bag and thought maybe she strained a muscle  She defers testing but just wants to continue to monitor her symptoms  ROS: Review of Systems   Constitutional: Negative  Respiratory: Negative  Cardiovascular: Positive for chest pain (twinge as noted in hpi) and leg swelling  Negative for palpitations  Gastrointestinal: Negative  Genitourinary: Negative  Musculoskeletal: Positive for arthralgias (R knee, may go back to same orthopedist who replaced her L knee )  Skin: Negative  Neurological: Negative  Psychiatric/Behavioral: Negative for suicidal ideas  Dysphoric mood: stable  Nervous/anxious: stable  Past Medical History:   Diagnosis Date    Asthma     Chronic kidney disease     CKD stage 3    Hyperlipidemia     Hypertension      Patient Active Problem List   Diagnosis    Age-related nuclear cataract, bilateral    Allergic rhinitis    Anemia    Anxiety disorder    Benign essential hypertension    Central sleep apnea    Irritable bowel syndrome with diarrhea    CKD (chronic kidney disease), stage III (HCC)    Depression with anxiety    Dyslipidemia    Hypoproteinemia (HCC)    Loss of height    Mild persistent asthma without complication    Nephrosclerosis    Osteoarthritis of left knee    Osteopenia    Overactive bladder    Primary insomnia    Vitamin B12 deficiency    Vitamin D deficiency    Chondromalacia patellae    Trochanteric bursitis    Gastroesophageal reflux disease without esophagitis       Past Surgical History:   Procedure Laterality Date    COLONOSCOPY      DILATION AND CURETTAGE OF UTERUS      HYSTEROSCOPY      of uterus  d&c 1997    HYSTEROSCOPY W/ POLYPECTOMY      2000 and 2001  resolved    NOSE SURGERY      nasal endoscopy polypectomy    NOSE SURGERY      [place] ESOPHAGOGASTRODUODENOSCOPY TRANSORAL DIAGNOSTIC N/A 2/6/2019    Procedure: ESOPHAGOGASTRODUODENOSCOPY (EGD); Surgeon: Delfino King MD;  Location: [facility] GI LAB;   Service: Gastroenterology    TONSILLECTOMY      TOTAL KNEE ARTHROPLASTY Left        Social History     Socioeconomic History    Marital status: /Civil Union     Spouse name: None    Number of children: None    Years of education: None    Highest education level: None   Occupational History    None   Social Needs    Financial resource strain: None    Food insecurity:     Worry: None     Inability: None    Transportation needs:     Medical: No     Non-medical: No   Tobacco Use    Smoking status: Never Smoker    Smokeless tobacco: Never Used   Substance and Sexual Activity    Alcohol use: Yes     Comment: social    Drug use: No    Sexual activity: Not Currently   Lifestyle    Physical activity:     Days per week: 0 days     Minutes per session: 0 min    Stress: None   Relationships    Social connections:     Talks on phone: None     Gets together: None     Attends Taoism service: None     Active member of club or organization: None     Attends meetings of clubs or organizations: None     Relationship status: None    Intimate partner violence:     Fear of current or ex partner: None     Emotionally abused: None     Physically abused: None     Forced sexual activity: None   Other Topics Concern    None   Social History Narrative    Uses safety equipment  Seat belt       Family History   Problem Relation Age of Onset    Heart attack Mother     Osteoporosis Mother     Uterine cancer Mother        Allergies   Allergen Reactions    Clarithromycin      Reaction Date: 12Apr2011;     Codeine      Reaction Date: 12Apr2011;     Estradiol      Annotation - [id number]: ELEVATES BP    Hydrocodone     Meloxicam     Tramadol Other (See Comments)     red,itchy    Hydrocodone-Acetaminophen Rash         Current Outpatient Medications:     albuterol (2 5 mg/3 mL) 0 083 % nebulizer solution, Inhale 1 each, Disp: , Rfl:     albuterol (VENTOLIN HFA) 90 mcg/act inhaler, Inhale 2 puffs, Disp: , Rfl:     atorvastatin (LIPITOR) 10 mg tablet, Take 1 tablet (10 mg total) by mouth daily, Disp: 90 tablet, Rfl: 1    buPROPion (WELLBUTRIN XL) 150 mg 24 hr tablet, Take 1 tablet (150 mg total) by mouth daily, Disp: 90 tablet, Rfl: 1    Calcium Carbonate (CALTRATE 600) 1500 (600 Ca) MG TABS, Take 1 tablet by mouth 2 (two) times a day, Disp: , Rfl:     Cetirizine HCl (ZYRTEC ALLERGY PO), Take 1 tablet by mouth, Disp: , Rfl:     colesevelam (WELCHOL) 625 mg tablet, Take 3 tablets (1,875 mg total) by mouth 2 (two) times a day with meals (Patient taking differently: Take 1,875 mg by mouth 2 (two) times a day with meals PRN), Disp: 540 tablet, Rfl: 1    escitalopram (LEXAPRO) 20 mg tablet, Take 1 tablet (20 mg total) by mouth daily, Disp: 90 tablet, Rfl: 1    esomeprazole (NexIUM) 40 MG capsule, Take 1 capsule (40 mg total) by mouth daily, Disp: 30 capsule, Rfl: 3    fluticasone (FLONASE) 50 mcg/act nasal spray, into each nostril, Disp: , Rfl:     fluticasone-salmeterol (ADVAIR DISKUS) 100-50 mcg/dose inhaler, Inhale 1 puff every 12 (twelve) hours, Disp: 50 Inhaler, Rfl: 0    losartan-hydrochlorothiazide (HYZAAR) 100-12 5 MG per tablet, Take 1 tablet by mouth daily, Disp: 90 tablet, Rfl: 1    pseudoephedrine (SUDAFED) 30 mg tablet, Take 60 mg by mouth every 4 (four) hours as needed for congestion, Disp: , Rfl:       Physical Exam:  /82 (BP Location: Left arm, Patient Position: Sitting, Cuff Size: Adult)   Pulse 86   Temp 97 9 °F (36 6 °C) (Tympanic)   Resp 17   Ht 5' 5" (1 651 m)   Wt 79 4 kg (175 lb)   SpO2 95%   BMI 29 12 kg/m²     Physical Exam   Constitutional: She is oriented to person, place, and time  She appears well-developed and well-nourished  No distress  HENT:   Head: Normocephalic and atraumatic  Nose: Nose normal    Mouth/Throat: Oropharynx is clear and moist    Eyes: Pupils are equal, round, and reactive to light  Conjunctivae are normal    Neck: Normal range of motion  Neck supple  No thyromegaly present  Cardiovascular: Normal rate, regular rhythm and normal heart sounds  Pulmonary/Chest: Effort normal and breath sounds normal  No respiratory distress     Musculoskeletal: She exhibits no edema or deformity  Neurological: She is alert and oriented to person, place, and time  No cranial nerve deficit  Skin: Skin is warm and dry  No rash noted  Psychiatric: She has a normal mood and affect   Her behavior is normal          Labs:  Lab Results   Component Value Date    WBC 5 72 09/18/2018    HGB 11 8 09/18/2018    HCT 35 7 09/18/2018    MCV 93 09/18/2018     09/18/2018     Lab Results   Component Value Date    K 3 5 09/18/2019     09/18/2019    CO2 31 09/18/2019    BUN 16 09/18/2019    CREATININE 1 15 09/18/2019    GLUF 85 09/18/2019    CALCIUM 9 5 09/18/2019    AST 16 09/18/2019    ALT 24 09/18/2019    ALKPHOS 86 09/18/2019    EGFR 48 09/18/2019

## 2019-12-03 ENCOUNTER — OFFICE VISIT (OUTPATIENT)
Dept: URGENT CARE | Facility: CLINIC | Age: 74
End: 2019-12-03
Payer: MEDICARE

## 2019-12-03 VITALS
DIASTOLIC BLOOD PRESSURE: 65 MMHG | RESPIRATION RATE: 16 BRPM | HEIGHT: 65 IN | OXYGEN SATURATION: 99 % | WEIGHT: 175 LBS | TEMPERATURE: 98.1 F | HEART RATE: 80 BPM | BODY MASS INDEX: 29.16 KG/M2 | SYSTOLIC BLOOD PRESSURE: 142 MMHG

## 2019-12-03 DIAGNOSIS — J06.9 ACUTE URI: Primary | ICD-10-CM

## 2019-12-03 PROCEDURE — 99213 OFFICE O/P EST LOW 20 MIN: CPT | Performed by: NURSE PRACTITIONER

## 2019-12-03 PROCEDURE — G0463 HOSPITAL OUTPT CLINIC VISIT: HCPCS | Performed by: NURSE PRACTITIONER

## 2019-12-03 NOTE — PROGRESS NOTES
[facility]        NAME: Marlys Zamudio is a 69 y o  female  : 1945    MRN: [id number]  DATE: December 3, 2019  TIME: 12:45 PM    Assessment and Plan   Acute URI [J06 9]  1  Acute URI           Patient Instructions     DayQuil and NyQuil as directed  Increase fluid intake  Follow up with PCP in 3-5 days  Proceed to  ER if symptoms worsen  Chief Complaint     Chief Complaint   Patient presents with    Cough     x 3 days    Cold Like Symptoms         History of Present Illness       URI    This is a new problem  Episode onset: 3rd day  There has been no fever  Associated symptoms include congestion, coughing, headaches, a plugged ear sensation, a sore throat and swollen glands  Pertinent negatives include no wheezing  Treatments tried: Sudafed and Mucinex  Review of Systems   Review of Systems   Constitutional: Positive for fatigue  Negative for fever  HENT: Positive for congestion, sinus pressure and sore throat  Respiratory: Positive for cough  Negative for chest tightness, shortness of breath and wheezing  Neurological: Positive for headaches           Current Medications       Current Outpatient Medications:     albuterol (2 5 mg/3 mL) 0 083 % nebulizer solution, Inhale 1 each, Disp: , Rfl:     albuterol (VENTOLIN HFA) 90 mcg/act inhaler, Inhale 2 puffs, Disp: , Rfl:     atorvastatin (LIPITOR) 10 mg tablet, Take 1 tablet (10 mg total) by mouth daily, Disp: 90 tablet, Rfl: 1    buPROPion (WELLBUTRIN XL) 150 mg 24 hr tablet, Take 1 tablet (150 mg total) by mouth daily, Disp: 90 tablet, Rfl: 1    Calcium Carbonate (CALTRATE 600) 1500 (600 Ca) MG TABS, Take 1 tablet by mouth 2 (two) times a day, Disp: , Rfl:     Cetirizine HCl (ZYRTEC ALLERGY PO), Take 1 tablet by mouth, Disp: , Rfl:     colesevelam (WELCHOL) 625 mg tablet, Take 3 tablets (1,875 mg total) by mouth 2 (two) times a day with meals (Patient taking differently: Take 1,875 mg by mouth 2 (two) times a day with meals PRN), Disp: 540 tablet, Rfl: 1    escitalopram (LEXAPRO) 20 mg tablet, Take 1 tablet (20 mg total) by mouth daily, Disp: 90 tablet, Rfl: 1    esomeprazole (NexIUM) 40 MG capsule, Take 1 capsule (40 mg total) by mouth daily, Disp: 30 capsule, Rfl: 3    fluticasone (FLONASE) 50 mcg/act nasal spray, into each nostril, Disp: , Rfl:     fluticasone-salmeterol (ADVAIR DISKUS) 100-50 mcg/dose inhaler, Inhale 1 puff every 12 (twelve) hours, Disp: 50 Inhaler, Rfl: 0    losartan-hydrochlorothiazide (HYZAAR) 100-12 5 MG per tablet, Take 1 tablet by mouth daily, Disp: 90 tablet, Rfl: 1    pseudoephedrine (SUDAFED) 30 mg tablet, Take 60 mg by mouth every 4 (four) hours as needed for congestion, Disp: , Rfl:     Current Allergies     Allergies as of 12/03/2019 - Reviewed 12/03/2019   Allergen Reaction Noted    Clarithromycin  04/21/2012    Codeine  04/21/2012    Estradiol  05/14/2013    Hydrocodone  07/30/2015    Meloxicam  10/19/2012    Tramadol Other (See Comments) 10/30/2014    Hydrocodone-acetaminophen Rash             The following portions of the patient's history were reviewed and updated as appropriate: allergies, current medications, past family history, past medical history, past social history, past surgical history and problem list      Past Medical History:   Diagnosis Date    Asthma     Chronic kidney disease     CKD stage 3    Hyperlipidemia     Hypertension        Past Surgical History:   Procedure Laterality Date    COLONOSCOPY      DILATION AND CURETTAGE OF UTERUS      HYSTEROSCOPY      of uterus  d&c 1997    HYSTEROSCOPY W/ POLYPECTOMY      2000 and 2001  resolved    NOSE SURGERY      nasal endoscopy polypectomy    NOSE SURGERY      [place] ESOPHAGOGASTRODUODENOSCOPY TRANSORAL DIAGNOSTIC N/A 2/6/2019    Procedure: ESOPHAGOGASTRODUODENOSCOPY (EGD); Surgeon: Horacio Bowser MD;  Location: [facility] GI [facility];   Service: Gastroenterology    TONSILLECTOMY      TOTAL KNEE ARTHROPLASTY Left Family History   Problem Relation Age of Onset    Heart attack Mother     Osteoporosis Mother     Uterine cancer Mother          Medications have been verified  Objective   /65   Pulse 80   Temp 98 1 °F (36 7 °C)   Resp 16   Ht 5' 5" (1 651 m)   Wt 79 4 kg (175 lb)   SpO2 99%   BMI 29 12 kg/m²        Physical Exam     Physical Exam   Constitutional: She is oriented to person, place, and time  She appears well-developed and well-nourished  No distress  HENT:   Right Ear: Tympanic membrane and ear canal normal    Left Ear: Tympanic membrane and ear canal normal    Nose: Rhinorrhea present  No mucosal edema  Right sinus exhibits no maxillary sinus tenderness and no frontal sinus tenderness  Left sinus exhibits no maxillary sinus tenderness and no frontal sinus tenderness  Mouth/Throat: Posterior oropharyngeal erythema present  Tonsils are 0 on the right  Tonsils are 0 on the left  No tonsillar exudate  Cardiovascular: Normal rate, regular rhythm and normal heart sounds  Pulmonary/Chest: Effort normal and breath sounds normal  No stridor  No respiratory distress  She has no wheezes  Lymphadenopathy:     She has cervical adenopathy  Neurological: She is alert and oriented to person, place, and time  Skin: Skin is warm and dry  She is not diaphoretic  Psychiatric: She has a normal mood and affect  Her behavior is normal    Nursing note and vitals reviewed

## 2019-12-03 NOTE — PATIENT INSTRUCTIONS
Cold Symptoms   WHAT YOU NEED TO KNOW:   A cold is an infection caused by a virus  The infection causes your upper respiratory system to become inflamed  Common symptoms of a cold include sneezing, dry throat, a stuffy nose, headache, watery eyes, and a cough  Your cough may be dry, or you may cough up mucus  You may also have muscle aches, joint pain, and tiredness  Rarely, you may have a fever  Most colds go away without treatment  DISCHARGE INSTRUCTIONS:   Return to the emergency department if:   · You have increased tiredness and weakness  · You are unable to eat  · Your heart is beating much faster than usual for you  · You see white spots in the back of your throat and your neck is swollen and sore to the touch  · You see pinpoint or larger reddish-purple dots on your skin  Contact your healthcare provider if:   · You have a fever higher than 102°F (38 9°C)  · You have new or worsening shortness of breath  · You have thick nasal drainage for more than 2 days  · Your symptoms do not improve or get worse within 5 days  · You have questions or concerns about your condition or care  Medicines: The following medicines may be suggested by your healthcare provider to decrease your cold symptoms  These medicines are available without a doctor's order  Ask which medicines to take and when to take them  Follow directions  · NSAIDs or acetaminophen  help to bring down a fever or decrease pain  · Decongestants  help decrease nasal stuffiness  · Antihistamines  help decrease sneezing and a runny nose  · Cough suppressants  help decrease how much you cough  · Expectorants  help loosen mucus so you can cough it up  · Take your medicine as directed  Contact your healthcare provider if you think your medicine is not helping or if you have side effects  Tell him of her if you are allergic to any medicine  Keep a list of the medicines, vitamins, and herbs you take   Include the amounts, and when and why you take them  Bring the list or the pill bottles to follow-up visits  Carry your medicine list with you in case of an emergency  Symptom relief: The following may help relieve cold symptoms, such as a dry throat and congestion:  · Gargle with mouthwash or warm salt water as directed  · Suck on throat lozenges or hard candy  · Use a cold or warm vaporizer or humidifier to ease your breathing  · Rest for at least 2 days and then as needed to decrease tiredness and weakness  · Use petroleum based jelly around your nostrils to decrease irritation from blowing your nose  Drink liquids:  Liquids will help thin and loosen thick mucus so you can cough it up  Liquids will also keep you hydrated  Ask your healthcare provider which liquids are best for you and how much to drink each day  Prevent the spread of germs: You can spread your cold germs to others for at least 3 days after your symptoms start  Wash your hands often  Do not share items, such as eating utensils  Cover your nose and mouth when you cough or sneeze using the crook of your elbow instead of your hands  Throw used tissues in the garbage  Do not smoke:  Smoking may worsen your symptoms and increase the length of time you feel sick  Talk with your healthcare provider if you need help to stop smoking  Follow up with your healthcare provider as directed:  Write down your questions so you remember to ask them during your visits  © 2017 [employer] Information is for End User's use only and may not be sold, redistributed or otherwise used for commercial purposes  All illustrations and images included in CareNotes® are the copyrighted property of [employer]  or [employer]  The above information is an  only  It is not intended as medical advice for individual conditions or treatments   Talk to your doctor, nurse or pharmacist before following any medical regimen to see if it is safe and effective for you

## 2019-12-05 ENCOUNTER — OFFICE VISIT (OUTPATIENT)
Dept: URGENT CARE | Facility: CLINIC | Age: 74
End: 2019-12-05
Payer: MEDICARE

## 2019-12-05 VITALS
SYSTOLIC BLOOD PRESSURE: 136 MMHG | DIASTOLIC BLOOD PRESSURE: 63 MMHG | TEMPERATURE: 97.6 F | OXYGEN SATURATION: 97 % | RESPIRATION RATE: 16 BRPM | HEART RATE: 82 BPM

## 2019-12-05 DIAGNOSIS — J01.90 ACUTE NON-RECURRENT SINUSITIS, UNSPECIFIED LOCATION: Primary | ICD-10-CM

## 2019-12-05 PROCEDURE — 99213 OFFICE O/P EST LOW 20 MIN: CPT | Performed by: PHYSICIAN ASSISTANT

## 2019-12-05 PROCEDURE — G0463 HOSPITAL OUTPT CLINIC VISIT: HCPCS | Performed by: PHYSICIAN ASSISTANT

## 2019-12-05 RX ORDER — BENZONATATE 100 MG/1
CAPSULE ORAL
Qty: 30 CAPSULE | Refills: 0 | Status: SHIPPED | OUTPATIENT
Start: 2019-12-05 | End: 2020-04-07

## 2019-12-05 RX ORDER — AMOXICILLIN AND CLAVULANATE POTASSIUM 875; 125 MG/1; MG/1
1 TABLET, FILM COATED ORAL EVERY 12 HOURS SCHEDULED
Qty: 20 TABLET | Refills: 0 | Status: SHIPPED | OUTPATIENT
Start: 2019-12-05 | End: 2019-12-15

## 2019-12-05 NOTE — PATIENT INSTRUCTIONS

## 2019-12-05 NOTE — PROGRESS NOTES
[facility]        NAME: Marlys Zamudio is a 69 y o  female  : 1945    MRN: [id number]  DATE: 2019  TIME: 10:27 AM    Assessment and Plan   Acute non-recurrent sinusitis, unspecified location [J01 90]  1  Acute non-recurrent sinusitis, unspecified location  amoxicillin-clavulanate (AUGMENTIN) 875-125 mg per tablet    benzonatate (TESSALON PERLES) 100 mg capsule         Patient Instructions     Take Augmentin as prescribed  Use Tessalon as needed for cough  Follow up with PCP in 3-5 days  Proceed to  ER if symptoms worsen  Chief Complaint     Chief Complaint   Patient presents with    Cough     Pt c/o sinus congestion and a cough for over a week  History of Present Illness       Patient presents with a one-week history of sinus pressure purulent postnasal drip and a cough which is productive of thick green mucus  Denies any chest pain shortness of breath fever chills  She does have intermittent frontal headaches  Patient states that she typically needs Augmentin for 10 days rather than 7 to completely eradicate the infection  Patient is taking over-the-counter cough suppressants without any improvement  Review of Systems   Review of Systems   Constitutional: Negative for chills and fever  HENT: Positive for congestion, postnasal drip, sinus pressure and sinus pain  Negative for ear pain and sore throat  Respiratory: Positive for cough  Negative for wheezing  Cardiovascular: Negative for chest pain  Gastrointestinal: Negative for nausea and vomiting  Musculoskeletal: Negative for myalgias  Skin: Negative for rash  Neurological: Positive for headaches  Negative for light-headedness  Hematological: Negative for adenopathy           Current Medications       Current Outpatient Medications:     albuterol (2 5 mg/3 mL) 0 083 % nebulizer solution, Inhale 1 each, Disp: , Rfl:     albuterol (VENTOLIN HFA) 90 mcg/act inhaler, Inhale 2 puffs, Disp: , Rfl:     amoxicillin-clavulanate (AUGMENTIN) 875-125 mg per tablet, Take 1 tablet by mouth every 12 (twelve) hours for 10 days, Disp: 20 tablet, Rfl: 0    atorvastatin (LIPITOR) 10 mg tablet, Take 1 tablet (10 mg total) by mouth daily, Disp: 90 tablet, Rfl: 1    benzonatate (TESSALON PERLES) 100 mg capsule, 1-2 tabs every 8 hrs as needed for cough, Disp: 30 capsule, Rfl: 0    buPROPion (WELLBUTRIN XL) 150 mg 24 hr tablet, Take 1 tablet (150 mg total) by mouth daily, Disp: 90 tablet, Rfl: 1    Calcium Carbonate (CALTRATE 600) 1500 (600 Ca) MG TABS, Take 1 tablet by mouth 2 (two) times a day, Disp: , Rfl:     Cetirizine HCl (ZYRTEC ALLERGY PO), Take 1 tablet by mouth, Disp: , Rfl:     colesevelam (WELCHOL) 625 mg tablet, Take 3 tablets (1,875 mg total) by mouth 2 (two) times a day with meals (Patient taking differently: Take 1,875 mg by mouth 2 (two) times a day with meals PRN), Disp: 540 tablet, Rfl: 1    escitalopram (LEXAPRO) 20 mg tablet, Take 1 tablet (20 mg total) by mouth daily, Disp: 90 tablet, Rfl: 1    esomeprazole (NexIUM) 40 MG capsule, Take 1 capsule (40 mg total) by mouth daily, Disp: 30 capsule, Rfl: 3    fluticasone (FLONASE) 50 mcg/act nasal spray, into each nostril, Disp: , Rfl:     fluticasone-salmeterol (ADVAIR DISKUS) 100-50 mcg/dose inhaler, Inhale 1 puff every 12 (twelve) hours, Disp: 50 Inhaler, Rfl: 0    losartan-hydrochlorothiazide (HYZAAR) 100-12 5 MG per tablet, Take 1 tablet by mouth daily, Disp: 90 tablet, Rfl: 1    pseudoephedrine (SUDAFED) 30 mg tablet, Take 60 mg by mouth every 4 (four) hours as needed for congestion, Disp: , Rfl:     Current Allergies     Allergies as of 12/05/2019 - Reviewed 12/05/2019   Allergen Reaction Noted    Clarithromycin  04/21/2012    Codeine  04/21/2012    Estradiol  05/14/2013    Hydrocodone  07/30/2015    Meloxicam  10/19/2012    Tramadol Other (See Comments) 10/30/2014    Hydrocodone-acetaminophen Rash             The following portions of the patient's history were reviewed and updated as appropriate: allergies, current medications, past family history, past medical history, past social history, past surgical history and problem list      Past Medical History:   Diagnosis Date    Asthma     Chronic kidney disease     CKD stage 3    Hyperlipidemia     Hypertension        Past Surgical History:   Procedure Laterality Date    COLONOSCOPY      DILATION AND CURETTAGE OF UTERUS      HYSTEROSCOPY      of uterus  d&c 1997    HYSTEROSCOPY W/ POLYPECTOMY      2000 and 2001  resolved    NOSE SURGERY      nasal endoscopy polypectomy    NOSE SURGERY      DC ESOPHAGOGASTRODUODENOSCOPY TRANSORAL DIAGNOSTIC N/A 2/6/2019    Procedure: ESOPHAGOGASTRODUODENOSCOPY (EGD); Surgeon: Shant Angela MD;  Location: [facility]; Service: Gastroenterology    TONSILLECTOMY      TOTAL KNEE ARTHROPLASTY Left        Family History   Problem Relation Age of Onset    Heart attack Mother     Osteoporosis Mother     Uterine cancer Mother          Medications have been verified  Objective   /63   Pulse 82   Temp 97 6 °F (36 4 °C)   Resp 16   SpO2 97%        Physical Exam     Physical Exam   Constitutional: She is oriented to person, place, and time  She appears well-developed and well-nourished  HENT:   Head: Normocephalic and atraumatic  Right Ear: External ear normal    Left Ear: External ear normal    Nose: Nose normal    Mouth/Throat: Oropharynx is clear and moist    Neck: Neck supple  Cardiovascular: Normal rate, regular rhythm and normal heart sounds  Pulmonary/Chest: Effort normal and breath sounds normal    Lymphadenopathy:     She has no cervical adenopathy  Neurological: She is alert and oriented to person, place, and time  Skin: Skin is warm and dry  No rash noted  Psychiatric: She has a normal mood and affect  Her behavior is normal    Nursing note and vitals reviewed

## 2020-04-07 ENCOUNTER — TELEMEDICINE (OUTPATIENT)
Dept: FAMILY MEDICINE CLINIC | Facility: CLINIC | Age: 75
End: 2020-04-07
Payer: MEDICARE

## 2020-04-07 VITALS — SYSTOLIC BLOOD PRESSURE: 150 MMHG | HEART RATE: 69 BPM | DIASTOLIC BLOOD PRESSURE: 67 MMHG

## 2020-04-07 DIAGNOSIS — Z12.31 BREAST CANCER SCREENING BY MAMMOGRAM: ICD-10-CM

## 2020-04-07 DIAGNOSIS — Z12.11 COLON CANCER SCREENING: Primary | ICD-10-CM

## 2020-04-07 DIAGNOSIS — J45.30 MILD PERSISTENT ASTHMA WITHOUT COMPLICATION: ICD-10-CM

## 2020-04-07 DIAGNOSIS — F41.8 DEPRESSION WITH ANXIETY: ICD-10-CM

## 2020-04-07 DIAGNOSIS — I10 BENIGN ESSENTIAL HYPERTENSION: ICD-10-CM

## 2020-04-07 DIAGNOSIS — E78.5 DYSLIPIDEMIA: ICD-10-CM

## 2020-04-07 DIAGNOSIS — Z78.0 POSTMENOPAUSAL: ICD-10-CM

## 2020-04-07 PROCEDURE — 99213 OFFICE O/P EST LOW 20 MIN: CPT | Performed by: PHYSICIAN ASSISTANT

## 2020-04-10 DIAGNOSIS — F41.8 DEPRESSION WITH ANXIETY: ICD-10-CM

## 2020-04-10 DIAGNOSIS — I10 HYPERTENSION, UNSPECIFIED TYPE: ICD-10-CM

## 2020-04-10 DIAGNOSIS — E78.2 MIXED HYPERLIPIDEMIA: ICD-10-CM

## 2020-04-10 DIAGNOSIS — F32.89 OTHER DEPRESSION: ICD-10-CM

## 2020-04-10 RX ORDER — ATORVASTATIN CALCIUM 10 MG/1
10 TABLET, FILM COATED ORAL DAILY
Qty: 90 TABLET | Refills: 1 | Status: SHIPPED | OUTPATIENT
Start: 2020-04-10 | End: 2020-08-19

## 2020-04-10 RX ORDER — ESCITALOPRAM OXALATE 20 MG/1
20 TABLET ORAL DAILY
Qty: 90 TABLET | Refills: 1 | Status: SHIPPED | OUTPATIENT
Start: 2020-04-10 | End: 2020-08-19

## 2020-04-10 RX ORDER — BUPROPION HYDROCHLORIDE 150 MG/1
150 TABLET ORAL DAILY
Qty: 90 TABLET | Refills: 1 | Status: SHIPPED | OUTPATIENT
Start: 2020-04-10 | End: 2020-08-19

## 2020-04-10 RX ORDER — LOSARTAN POTASSIUM AND HYDROCHLOROTHIAZIDE 12.5; 1 MG/1; MG/1
1 TABLET ORAL DAILY
Qty: 90 TABLET | Refills: 1 | Status: SHIPPED | OUTPATIENT
Start: 2020-04-10 | End: 2020-08-19

## 2020-06-01 ENCOUNTER — OFFICE VISIT (OUTPATIENT)
Dept: URGENT CARE | Facility: CLINIC | Age: 75
End: 2020-06-01
Payer: MEDICARE

## 2020-06-01 VITALS
OXYGEN SATURATION: 99 % | DIASTOLIC BLOOD PRESSURE: 70 MMHG | RESPIRATION RATE: 16 BRPM | SYSTOLIC BLOOD PRESSURE: 155 MMHG | TEMPERATURE: 97.3 F | HEART RATE: 66 BPM

## 2020-06-01 DIAGNOSIS — N39.0 URINARY TRACT INFECTION WITH HEMATURIA, SITE UNSPECIFIED: Primary | ICD-10-CM

## 2020-06-01 DIAGNOSIS — R31.9 URINARY TRACT INFECTION WITH HEMATURIA, SITE UNSPECIFIED: Primary | ICD-10-CM

## 2020-06-01 LAB
SL AMB  POCT GLUCOSE, UA: ABNORMAL
SL AMB LEUKOCYTE ESTERASE,UA: ABNORMAL
SL AMB POCT BILIRUBIN,UA: ABNORMAL
SL AMB POCT BLOOD,UA: ABNORMAL
SL AMB POCT CLARITY,UA: ABNORMAL
SL AMB POCT COLOR,UA: YELLOW
SL AMB POCT KETONES,UA: ABNORMAL
SL AMB POCT NITRITE,UA: ABNORMAL
SL AMB POCT PH,UA: 6
SL AMB POCT SPECIFIC GRAVITY,UA: 1
SL AMB POCT URINE PROTEIN: ABNORMAL
SL AMB POCT UROBILINOGEN: 0.2

## 2020-06-01 PROCEDURE — 87086 URINE CULTURE/COLONY COUNT: CPT | Performed by: PHYSICIAN ASSISTANT

## 2020-06-01 PROCEDURE — 99213 OFFICE O/P EST LOW 20 MIN: CPT | Performed by: PHYSICIAN ASSISTANT

## 2020-06-01 PROCEDURE — G0463 HOSPITAL OUTPT CLINIC VISIT: HCPCS | Performed by: PHYSICIAN ASSISTANT

## 2020-06-01 PROCEDURE — 81002 URINALYSIS NONAUTO W/O SCOPE: CPT | Performed by: PHYSICIAN ASSISTANT

## 2020-06-01 RX ORDER — NITROFURANTOIN 25; 75 MG/1; MG/1
100 CAPSULE ORAL 2 TIMES DAILY
Qty: 10 CAPSULE | Refills: 0 | Status: SHIPPED | OUTPATIENT
Start: 2020-06-01 | End: 2020-06-06

## 2020-06-02 LAB — BACTERIA UR CULT: NORMAL

## 2020-08-17 ENCOUNTER — OFFICE VISIT (OUTPATIENT)
Dept: URGENT CARE | Facility: CLINIC | Age: 75
End: 2020-08-17
Payer: MEDICARE

## 2020-08-17 ENCOUNTER — APPOINTMENT (OUTPATIENT)
Dept: RADIOLOGY | Facility: CLINIC | Age: 75
End: 2020-08-17
Payer: MEDICARE

## 2020-08-17 VITALS
TEMPERATURE: 96.9 F | RESPIRATION RATE: 18 BRPM | SYSTOLIC BLOOD PRESSURE: 142 MMHG | BODY MASS INDEX: 27.97 KG/M2 | DIASTOLIC BLOOD PRESSURE: 67 MMHG | HEART RATE: 68 BPM | HEIGHT: 66 IN | WEIGHT: 174 LBS | OXYGEN SATURATION: 99 %

## 2020-08-17 DIAGNOSIS — M25.561 ACUTE PAIN OF RIGHT KNEE: ICD-10-CM

## 2020-08-17 DIAGNOSIS — M25.561 ACUTE PAIN OF RIGHT KNEE: Primary | ICD-10-CM

## 2020-08-17 PROCEDURE — 73564 X-RAY EXAM KNEE 4 OR MORE: CPT

## 2020-08-17 PROCEDURE — 99213 OFFICE O/P EST LOW 20 MIN: CPT | Performed by: NURSE PRACTITIONER

## 2020-08-17 PROCEDURE — G0463 HOSPITAL OUTPT CLINIC VISIT: HCPCS | Performed by: NURSE PRACTITIONER

## 2020-08-17 NOTE — PATIENT INSTRUCTIONS
No acute abnormality on x-ray  There are some degenerative changes noted  Ace wrap applied in office  Tylenol as needed for pain  Ice every 3-4 hours for 20 minutes  Appointment scheduled with Orthopedics

## 2020-08-17 NOTE — PROGRESS NOTES
[facility]        NAME: Temo Rebollar is a 70 y o  female  : 1945    MRN: [id number]  DATE: 2020  TIME: 4:04 PM    Assessment and Plan   Acute pain of right knee [M25 561]  1  Acute pain of right knee  XR knee 4+ vw right injury         Patient Instructions     Patient Instructions   No acute abnormality on x-ray  There are some degenerative changes noted  Ace wrap applied in office  Tylenol as needed for pain  Ice every 3-4 hours for 20 minutes  Appointment scheduled with Orthopedics  Chief Complaint     Chief Complaint   Patient presents with    Knee Pain     Patinet c/o right knee pain x 1 week  Patient reports she was doing alot of physical activity on vacation last week  History of Present Illness   Temo Rebollar presents to the clinic c/o    This is a 79-year-old female here today with complaints of right knee pain  She does note that she was away on vacation and riding her bike/going in the surf  She does not recall any specific injury  She has some pain and swelling  She has not tried anything over-the-counter  Pain is worse with movements  She states she has noticed some discomfort in her knee prior to having this pain  She has not seen any orthopedic doctor for this pain  Review of Systems   Review of Systems   Constitutional: Negative for activity change, chills, fatigue and fever  HENT: Negative  Respiratory: Negative  Cardiovascular: Negative  Musculoskeletal: Positive for arthralgias  Right knee pain   Psychiatric/Behavioral: Negative            Current Medications     Long-Term Medications   Medication Sig Dispense Refill    atorvastatin (LIPITOR) 10 mg tablet Take 1 tablet (10 mg total) by mouth daily 90 tablet 1    buPROPion (WELLBUTRIN XL) 150 mg 24 hr tablet Take 1 tablet (150 mg total) by mouth daily 90 tablet 1    Calcium Carbonate (CALTRATE 600) 1500 (600 Ca) MG TABS Take 1 tablet by mouth 2 (two) times a day      [facility]) 625 mg tablet Take 3 tablets (1,875 mg total) by mouth 2 (two) times a day with meals (Patient taking differently: Take 1,875 mg by mouth 2 (two) times a day with meals PRN) 540 tablet 1    escitalopram (LEXAPRO) 20 mg tablet Take 1 tablet (20 mg total) by mouth daily 90 tablet 1    esomeprazole (NexIUM) 40 MG capsule Take 1 capsule (40 mg total) by mouth daily 30 capsule 3    fluticasone (FLONASE) 50 mcg/act nasal spray into each nostril      fluticasone-salmeterol (ADVAIR DISKUS) 100-50 mcg/dose inhaler Inhale 1 puff every 12 (twelve) hours 50 Inhaler 0    losartan-hydrochlorothiazide (HYZAAR) 100-12 5 MG per tablet Take 1 tablet by mouth daily 90 tablet 1       Current Allergies     Allergies as of 08/17/2020 - Reviewed 08/17/2020   Allergen Reaction Noted    Clarithromycin  04/21/2012    Codeine  04/21/2012    Estradiol  05/14/2013    Hydrocodone  07/30/2015    Meloxicam  10/19/2012    Tramadol Other (See Comments) 10/30/2014    Hydrocodone-acetaminophen Rash             The following portions of the patient's history were reviewed and updated as appropriate: allergies, current medications, past family history, past medical history, past social history, past surgical history and problem list     Objective   /67   Pulse 68   Temp (!) 96 9 °F (36 1 °C) (Temporal)   Resp 18   Ht 5' 6" (1 676 m)   Wt 78 9 kg (174 lb)   SpO2 99%   BMI 28 08 kg/m²        Physical Exam     Physical Exam  Vitals signs and nursing note reviewed  Constitutional:       Appearance: Normal appearance  Cardiovascular:      Rate and Rhythm: Normal rate and regular rhythm  Pulmonary:      Effort: Pulmonary effort is normal       Breath sounds: Normal breath sounds  Musculoskeletal:      Comments: Right knee: TTP over the right knee  There is some mild swelling  No laxity in the knee joint     Neurological:      Mental Status: She is alert and oriented to person, place, and time    Psychiatric:         Mood and Affect: Mood normal          Behavior: Behavior normal          Thought Content:  Thought content normal

## 2020-08-18 DIAGNOSIS — F41.8 DEPRESSION WITH ANXIETY: ICD-10-CM

## 2020-08-18 DIAGNOSIS — F32.89 OTHER DEPRESSION: ICD-10-CM

## 2020-08-18 DIAGNOSIS — E78.2 MIXED HYPERLIPIDEMIA: ICD-10-CM

## 2020-08-18 DIAGNOSIS — I10 HYPERTENSION, UNSPECIFIED TYPE: ICD-10-CM

## 2020-08-19 RX ORDER — BUPROPION HYDROCHLORIDE 150 MG/1
TABLET ORAL
Qty: 90 TABLET | Refills: 1 | Status: SHIPPED | OUTPATIENT
Start: 2020-08-19 | End: 2021-03-14

## 2020-08-19 RX ORDER — LOSARTAN POTASSIUM AND HYDROCHLOROTHIAZIDE 12.5; 1 MG/1; MG/1
TABLET ORAL
Qty: 90 TABLET | Refills: 1 | Status: SHIPPED | OUTPATIENT
Start: 2020-08-19 | End: 2021-03-14

## 2020-08-19 RX ORDER — ATORVASTATIN CALCIUM 10 MG/1
TABLET, FILM COATED ORAL
Qty: 90 TABLET | Refills: 1 | Status: SHIPPED | OUTPATIENT
Start: 2020-08-19 | End: 2021-08-18

## 2020-08-19 RX ORDER — ESCITALOPRAM OXALATE 20 MG/1
TABLET ORAL
Qty: 90 TABLET | Refills: 1 | Status: SHIPPED | OUTPATIENT
Start: 2020-08-19 | End: 2021-03-14

## 2020-08-21 ENCOUNTER — OFFICE VISIT (OUTPATIENT)
Dept: OBGYN CLINIC | Facility: CLINIC | Age: 75
End: 2020-08-21
Payer: MEDICARE

## 2020-08-21 VITALS
BODY MASS INDEX: 28.37 KG/M2 | TEMPERATURE: 97.3 F | SYSTOLIC BLOOD PRESSURE: 158 MMHG | DIASTOLIC BLOOD PRESSURE: 80 MMHG | HEIGHT: 66 IN | WEIGHT: 176.5 LBS

## 2020-08-21 DIAGNOSIS — Z01.812 PRE-OPERATIVE LABORATORY EXAMINATION: ICD-10-CM

## 2020-08-21 DIAGNOSIS — Z11.59 SPECIAL SCREENING EXAMINATION FOR UNSPECIFIED VIRAL DISEASE: ICD-10-CM

## 2020-08-21 DIAGNOSIS — M17.11 PRIMARY OSTEOARTHRITIS OF RIGHT KNEE: Primary | ICD-10-CM

## 2020-08-21 PROCEDURE — 1160F RVW MEDS BY RX/DR IN RCRD: CPT | Performed by: ORTHOPAEDIC SURGERY

## 2020-08-21 PROCEDURE — 4040F PNEUMOC VAC/ADMIN/RCVD: CPT | Performed by: ORTHOPAEDIC SURGERY

## 2020-08-21 PROCEDURE — 3079F DIAST BP 80-89 MM HG: CPT | Performed by: ORTHOPAEDIC SURGERY

## 2020-08-21 PROCEDURE — 99203 OFFICE O/P NEW LOW 30 MIN: CPT | Performed by: ORTHOPAEDIC SURGERY

## 2020-08-21 PROCEDURE — 1036F TOBACCO NON-USER: CPT | Performed by: ORTHOPAEDIC SURGERY

## 2020-08-21 PROCEDURE — 3008F BODY MASS INDEX DOCD: CPT | Performed by: ORTHOPAEDIC SURGERY

## 2020-08-21 PROCEDURE — 3077F SYST BP >= 140 MM HG: CPT | Performed by: ORTHOPAEDIC SURGERY

## 2020-08-21 RX ORDER — CHLORHEXIDINE GLUCONATE 0.12 MG/ML
15 RINSE ORAL ONCE
Status: CANCELLED | OUTPATIENT
Start: 2020-10-07 | End: 2020-08-21

## 2020-08-21 RX ORDER — FERROUS SULFATE TAB EC 324 MG (65 MG FE EQUIVALENT) 324 (65 FE) MG
324 TABLET DELAYED RESPONSE ORAL
Qty: 60 TABLET | Refills: 0 | Status: SHIPPED | OUTPATIENT
Start: 2020-09-07 | End: 2020-10-20

## 2020-08-21 RX ORDER — CEFAZOLIN SODIUM 2 G/50ML
2000 SOLUTION INTRAVENOUS ONCE
Status: CANCELLED | OUTPATIENT
Start: 2020-10-07 | End: 2020-08-21

## 2020-08-21 RX ORDER — MULTIVIT-MIN/IRON FUM/FOLIC AC 7.5 MG-4
1 TABLET ORAL DAILY
Qty: 30 TABLET | Refills: 0 | Status: SHIPPED | OUTPATIENT
Start: 2020-09-07 | End: 2022-07-27

## 2020-08-21 RX ORDER — CHLORHEXIDINE GLUCONATE 4 G/100ML
SOLUTION TOPICAL DAILY PRN
Status: CANCELLED | OUTPATIENT
Start: 2020-10-07

## 2020-08-21 RX ORDER — ASCORBIC ACID 500 MG
500 TABLET ORAL 2 TIMES DAILY
Qty: 60 TABLET | Refills: 0 | Status: SHIPPED | OUTPATIENT
Start: 2020-09-07 | End: 2020-10-20

## 2020-08-21 RX ORDER — FOLIC ACID 1 MG/1
1 TABLET ORAL DAILY
Qty: 30 TABLET | Refills: 0 | Status: SHIPPED | OUTPATIENT
Start: 2020-09-07 | End: 2020-10-20

## 2020-08-21 NOTE — PROGRESS NOTES
Assessment/Plan:    No problem-specific Assessment & Plan notes found for this encounter  Diagnoses and all orders for this visit:    Primary osteoarthritis of right knee          The patient has opted for elective right total knee replacement  All risks, benefits, and complications were discussed with the patient in great detail including bleeding, infection, blood clots, pain, stiffness, neurovascular damage, fractures, dislocations, the possibility of loss of life or limb from surgery  Her surgery is tentatively scheduled for October 7, 2020    Subjective:      Patient ID: Lashawn Kim is a 70 y o  female  HPI     The patient has had progressive pain along her right knee for the past several years but worse over the past several months  She had 2 episodes on vacation were pain did worsen  She did have x-rays which showed advanced arthritic changes  Despite and activity modification, nodule medications, injections in the past, the pain does affect her lifestyle  She wants to proceed with elective right total knee replacement  The following portions of the patient's history were reviewed and updated as appropriate: allergies, current medications, past family history, past medical history, past social history, past surgical history and problem list     Review of Systems   Constitutional: Negative for chills, fever and unexpected weight change  HENT: Negative for hearing loss, nosebleeds and sore throat  Eyes: Negative for pain, redness and visual disturbance  Respiratory: Negative for cough, shortness of breath and wheezing  Cardiovascular: Negative for chest pain, palpitations and leg swelling  Gastrointestinal: Negative for abdominal pain, nausea and vomiting  Endocrine: Negative for polydipsia and polyuria  Genitourinary: Negative for dysuria and hematuria  Musculoskeletal: Positive for arthralgias and joint swelling   Negative for back pain, gait problem, myalgias, neck pain and neck stiffness  As noted in HPI   Skin: Negative for rash and wound  Neurological: Negative for dizziness, numbness and headaches  Psychiatric/Behavioral: Negative for decreased concentration and suicidal ideas  The patient is not nervous/anxious  Objective:      /80 (BP Location: Left arm, Patient Position: Sitting, Cuff Size: Standard)   Temp (!) 97 3 °F (36 3 °C)   Ht 5' 6" (1 676 m)   Wt 80 1 kg (176 lb 8 oz)   BMI 28 49 kg/m²          Physical Exam        Right lower extremity is neurovascular intact  Toes are pink and mobile  Compartments are soft  There is a varus deformity present along her right knee  Range of motion is from 5-115 degrees  There is a negative Lachman's, drawer, and pivot shift test   There is medial joint tenderness, lateral joint tenderness, and patellofemoral crepitation  There is a painful arc of motion throughout her right knee      X-rays do show advanced arthritic changes

## 2020-08-31 ENCOUNTER — APPOINTMENT (OUTPATIENT)
Dept: PHYSICAL THERAPY | Facility: CLINIC | Age: 75
End: 2020-08-31
Payer: MEDICARE

## 2020-09-03 ENCOUNTER — OFFICE VISIT (OUTPATIENT)
Dept: URGENT CARE | Facility: CLINIC | Age: 75
End: 2020-09-03
Payer: MEDICARE

## 2020-09-03 VITALS
HEART RATE: 71 BPM | WEIGHT: 175 LBS | DIASTOLIC BLOOD PRESSURE: 66 MMHG | BODY MASS INDEX: 28.12 KG/M2 | TEMPERATURE: 97.1 F | RESPIRATION RATE: 18 BRPM | SYSTOLIC BLOOD PRESSURE: 140 MMHG | HEIGHT: 66 IN | OXYGEN SATURATION: 94 %

## 2020-09-03 DIAGNOSIS — L25.5 CONTACT DERMATITIS DUE TO PLANT: Primary | ICD-10-CM

## 2020-09-03 PROCEDURE — G0463 HOSPITAL OUTPT CLINIC VISIT: HCPCS | Performed by: NURSE PRACTITIONER

## 2020-09-03 PROCEDURE — 99213 OFFICE O/P EST LOW 20 MIN: CPT | Performed by: NURSE PRACTITIONER

## 2020-09-03 RX ORDER — PREDNISONE 10 MG/1
TABLET ORAL
Qty: 26 TABLET | Refills: 0 | Status: SHIPPED | OUTPATIENT
Start: 2020-09-03 | End: 2020-09-18

## 2020-09-03 NOTE — PROGRESS NOTES
[address] [employer]        NAME: Luisa Piedra is a 70 y o  female  : 1945    MRN: [id number]  DATE: September 3, 2020  TIME: 2:20 PM    Assessment and Plan   Contact dermatitis due to plant [L25 5]  1  Contact dermatitis due to plant  predniSONE 10 mg tablet         Patient Instructions     Patient Instructions   Take medication as directed  Recommend applying over-the-counter hydrocortisone cream to affected areas  Oat meal soaks can be helpful  If you develop any increased redness, rash is spreading, facial swelling, shortness of breath, difficulty breathing, fever, any new or concerning symptoms please return or proceed ER  Advised follow-up with PCP in 3-5 days        Poison Ivy   WHAT YOU NEED TO KNOW:   Poison ivy is a plant that can cause an itchy, uncomfortable rash on your skin  Poison ivy grows as a shrub or vine in woods, fields, and areas of thick [place]  It has 3 bright green leaves on each stem that turn red in denis  DISCHARGE INSTRUCTIONS:   Medicines:   · Antiseptic or drying creams or ointments: These medicines may be used to dry out the rash and decrease the itching  These products may be available without a doctor's order  · Steroids: This medicine helps decrease itching and inflammation  It can be given as a cream to apply to your skin or as a pill  · Antihistamines: This medicine may help decrease itching and help you sleep  It is available without a doctor's order  · Take your medicine as directed  Contact your healthcare provider if you think your medicine is not helping or if you have side effects  Tell him of her if you are allergic to any medicine  Keep a list of the medicines, vitamins, and herbs you take  Include the amounts, and when and why you take them  Bring the list or the pill bottles to follow-up visits  Carry your medicine list with you in case of an emergency    Follow up with your healthcare provider as directed:  Write down your questions so you remember to ask them during your visits  How your poison ivy rash spreads: You cannot spread poison ivy by touching your rash or the liquid from your blisters  Poison ivy is spread only if you scratch your skin while it still has oil on it  You may think your rash is spreading because new rashes appear over a number of days  This happens because areas covered by thin skin break out in a rash first  Your face or forearms may develop a rash before thicker areas, such as the palms of your hands  Self-care:   · Keep your rash clean and dry:  Wash it with soap and water  Gently pat it dry with a clean towel  · Try not to scratch or rub your rash: This can cause your skin to become infected  · Use a compress on your rash:  Dip a clean washcloth in cool water  Wring it out and place it on your rash  Leave the washcloth on your skin for 15 minutes  Do this at least 3 times per day  · Take a cornstarch or oatmeal bath: If your rash is too large to cover with wet washcloths, take 3 or 4 cornstarch baths daily  Mix 1 pound of cornstarch with a little water to make a paste  Add the paste to a tub full of water and mix well  You may also use colloidal oatmeal in the bath water  Use lukewarm water  Avoid hot water because it may cause your itching to increase  Prevent a poison ivy rash in the future:   · Wear skin protection:  Wear long pants, a long-sleeved shirt, and gloves  Use a skin block lotion to protect your skin from poison ivy oil  You can find this at a drugstore without a prescription  · Wash clothing after possible exposure: If you think you have been near a poison ivy plant, wash the clothes you were wearing separately from other clothes  Rinse the washing machine well after you take the clothes out  Scrub boots and shoes with warm, soapy water  Dry clean items and clothing that you cannot wash in water  Poison ivy oil is sticky and can stay on surfaces for a long time   It can cause a new rash even years later  · Bathe your pet:  Use warm water and shampoo on your pet's fur  This will prevent the spread of oil to your skin, car, and home  Wear long sleeves, long pants, and gloves while washing pets or any items that may have oil on them  · Reduce exposure to poison ivy:  Do not touch plants that look like poison ivy  Keep your yard free of poison ivy  While protecting your skin, remove the plant and the roots  Place them in a plastic bag and seal the bag tightly  · Do not burn poison ivy plants: This can spread the oil through the air  If you breathe the oil into your lungs, you could have swelling and serious breathing problems  Oil that clings to the fire yi can land on your skin and cause a rash  Contact your healthcare provider if:   · You have pus, soft yellow scabs, or tenderness on the rash  · The itching gets worse or keeps you awake at night  · The rash covers more than 1/4 of your skin or spreads to your eyes, mouth, or genital area  · The rash is not better after 2 to 3 weeks  · You have tender, swollen glands on the sides of your neck  · You have swelling in your arms and legs  · You have questions or concerns about your condition or care  Return to the emergency department if:   · You have a fever  · You have redness, swelling, and tenderness around the rash  · You have trouble breathing  © 2017 [address] [employer] Information is for End User's use only and may not be sold, redistributed or otherwise used for commercial purposes  All illustrations and images included in CareNotes® are the copyrighted property of [employer]  or Alex Parrish  The above information is an  only  It is not intended as medical advice for individual conditions or treatments  Talk to your doctor, nurse or pharmacist before following any medical regimen to see if it is safe and effective for you          Follow up with PCP in 3-5 days  Proceed to  ER if symptoms worsen  Chief Complaint     Chief Complaint   Patient presents with    Rash     poison ivy, and hands and arms, started monday          History of Present Illness       Patient is a 81-year-old female presents with a 3 day history of rash to bilateral hands and arms  Patient states the rash began after clearing branches with poison ivy from her yard  Has tried calamine lotion, aloe, and Benadryl with mild relief  Denies any tongue or lip swelling, facial swelling, or difficulty breathing  Denies any numbness, tingling, or weakness of extremities  Denies any close contacts with similar rash  Denies any fever, chills, or body aches  Review of Systems   Review of Systems   Constitutional: Negative for chills, diaphoresis, fatigue and fever  HENT: Negative  Respiratory: Negative for cough, chest tightness, shortness of breath, wheezing and stridor  Cardiovascular: Negative for chest pain and palpitations  Gastrointestinal: Negative  Musculoskeletal: Negative for arthralgias, back pain, joint swelling, myalgias and neck pain  Skin: Positive for rash  Negative for wound  Neurological: Negative            Current Medications       Current Outpatient Medications:     albuterol (2 5 mg/3 mL) 0 083 % nebulizer solution, Inhale 1 each, Disp: , Rfl:     albuterol (VENTOLIN HFA) 90 mcg/act inhaler, Inhale 2 puffs, Disp: , Rfl:     [START ON 9/7/2020] ascorbic acid (VITAMIN C) 500 MG tablet, Take 1 tablet (500 mg total) by mouth 2 (two) times a day, Disp: 60 tablet, Rfl: 0    atorvastatin (LIPITOR) 10 mg tablet, TAKE 1 TABLET EVERY DAY, Disp: 90 tablet, Rfl: 1    buPROPion (WELLBUTRIN XL) 150 mg 24 hr tablet, TAKE 1 TABLET EVERY DAY, Disp: 90 tablet, Rfl: 1    Calcium Carbonate (CALTRATE 600) 1500 (600 Ca) MG TABS, Take 1 tablet by mouth 2 (two) times a day, Disp: , Rfl:     Cetirizine HCl (ZYRTEC ALLERGY PO), Take 1 tablet by mouth, Disp: , Rfl:    colesevelam (WELCHOL) 625 mg tablet, Take 3 tablets (1,875 mg total) by mouth 2 (two) times a day with meals (Patient taking differently: Take 1,875 mg by mouth 2 (two) times a day with meals PRN), Disp: 540 tablet, Rfl: 1    escitalopram (LEXAPRO) 20 mg tablet, TAKE 1 TABLET EVERY DAY, Disp: 90 tablet, Rfl: 1    esomeprazole (NexIUM) 40 MG capsule, Take 1 capsule (40 mg total) by mouth daily, Disp: 30 capsule, Rfl: 3    [START ON 9/7/2020] ferrous sulfate 324 (65 Fe) mg, Take 1 tablet (324 mg total) by mouth 2 (two) times a day before meals, Disp: 60 tablet, Rfl: 0    fluticasone (FLONASE) 50 mcg/act nasal spray, into each nostril, Disp: , Rfl:     fluticasone-salmeterol (ADVAIR DISKUS) 100-50 mcg/dose inhaler, Inhale 1 puff every 12 (twelve) hours, Disp: 50 Inhaler, Rfl: 0    [START ON 4/6/2759] folic acid (FOLVITE) 1 mg tablet, Take 1 tablet (1 mg total) by mouth daily, Disp: 30 tablet, Rfl: 0    Suzanne, Zingiber officinalis, (SUZANNE EXTRACT PO), Take by mouth, Disp: , Rfl:     losartan-hydrochlorothiazide (HYZAAR) 100-12 5 MG per tablet, TAKE 1 TABLET EVERY DAY, Disp: 90 tablet, Rfl: 1    [START ON 9/7/2020] Multiple Vitamins-Minerals (multivitamin with minerals) tablet, Take 1 tablet by mouth daily, Disp: 30 tablet, Rfl: 0    predniSONE 10 mg tablet, 3 tablets BID x 2 days,2 tablets BID x 2 days,1 tablet BID x 2 days,1 tablet daily x 2 days  , Disp: 26 tablet, Rfl: 0    Probiotic Product (PROBIOTIC DAILY PO), Take by mouth, Disp: , Rfl:     Current Allergies     Allergies as of 09/03/2020 - Reviewed 09/03/2020   Allergen Reaction Noted    Clarithromycin  04/21/2012    Codeine  04/21/2012    Estradiol  05/14/2013    Hydrocodone  07/30/2015    Meloxicam  10/19/2012    Tramadol Other (See Comments) 10/30/2014    Hydrocodone-acetaminophen Rash             The following portions of the patient's history were reviewed and updated as appropriate: allergies, current medications, past family history, past medical history, past social history, past surgical history and problem list      Past Medical History:   Diagnosis Date    Asthma     Chronic kidney disease     CKD stage 3    Hyperlipidemia     Hypertension     Nasal polyps        Past Surgical History:   Procedure Laterality Date    COLONOSCOPY      DILATION AND CURETTAGE OF UTERUS      HYSTEROSCOPY      of uterus  d&c 1997    HYSTEROSCOPY W/ POLYPECTOMY      2000 and 2001  resolved    NOSE SURGERY      nasal endoscopy polypectomy    NOSE SURGERY      HI ESOPHAGOGASTRODUODENOSCOPY TRANSORAL DIAGNOSTIC N/A 2/6/2019    Procedure: ESOPHAGOGASTRODUODENOSCOPY (EGD); Surgeon: Keely Morales MD;  Location: [facility]; Service: Gastroenterology    TONSILLECTOMY      TOTAL KNEE ARTHROPLASTY Left        Family History   Problem Relation Age of Onset    Heart attack Mother     Osteoporosis Mother     Uterine cancer Mother          Medications have been verified  Objective   /66   Pulse 71   Temp (!) 97 1 °F (36 2 °C) (Temporal)   Resp 18   Ht 5' 6" (1 676 m)   Wt 79 4 kg (175 lb)   SpO2 94%   BMI 28 25 kg/m²        Physical Exam     Physical Exam  Constitutional:       General: She is not in acute distress  Appearance: Normal appearance  She is not diaphoretic  HENT:      Mouth/Throat:      Pharynx: Oropharynx is clear  Uvula midline  Cardiovascular:      Rate and Rhythm: Normal rate and regular rhythm  Pulses: Normal pulses  Heart sounds: Normal heart sounds  Pulmonary:      Effort: Pulmonary effort is normal       Breath sounds: Normal breath sounds and air entry  Skin:     General: Skin is warm and dry  Capillary Refill: Capillary refill takes less than 2 seconds  Findings: Rash present  Rash is macular and papular (to bilateral hands and arms  no bleeding or drainage  small amount of vesicles on digits  )  Neurological:      Mental Status: She is alert

## 2020-09-03 NOTE — PATIENT INSTRUCTIONS
Take medication as directed  Recommend applying over-the-counter hydrocortisone cream to affected areas  Oat meal soaks can be helpful  If you develop any increased redness, rash is spreading, facial swelling, shortness of breath, difficulty breathing, fever, any new or concerning symptoms please return or proceed ER  Advised follow-up with PCP in 3-5 days        Poison Ivy   WHAT YOU NEED TO KNOW:   Poison ivy is a plant that can cause an itchy, uncomfortable rash on your skin  Poison ivy grows as a shrub or vine in woods, fields, and areas of thick [place]  It has 3 bright green leaves on each stem that turn red in denis  DISCHARGE INSTRUCTIONS:   Medicines:   · Antiseptic or drying creams or ointments: These medicines may be used to dry out the rash and decrease the itching  These products may be available without a doctor's order  · Steroids: This medicine helps decrease itching and inflammation  It can be given as a cream to apply to your skin or as a pill  · Antihistamines: This medicine may help decrease itching and help you sleep  It is available without a doctor's order  · Take your medicine as directed  Contact your healthcare provider if you think your medicine is not helping or if you have side effects  Tell him of her if you are allergic to any medicine  Keep a list of the medicines, vitamins, and herbs you take  Include the amounts, and when and why you take them  Bring the list or the pill bottles to follow-up visits  Carry your medicine list with you in case of an emergency  Follow up with your healthcare provider as directed:  Write down your questions so you remember to ask them during your visits  How your poison ivy rash spreads: You cannot spread poison ivy by touching your rash or the liquid from your blisters  Poison ivy is spread only if you scratch your skin while it still has oil on it   You may think your rash is spreading because new rashes appear over a number of days  This happens because areas covered by thin skin break out in a rash first  Your face or forearms may develop a rash before thicker areas, such as the palms of your hands  Self-care:   · Keep your rash clean and dry:  Wash it with soap and water  Gently pat it dry with a clean towel  · Try not to scratch or rub your rash: This can cause your skin to become infected  · Use a compress on your rash:  Dip a clean washcloth in cool water  Wring it out and place it on your rash  Leave the washcloth on your skin for 15 minutes  Do this at least 3 times per day  · Take a cornstarch or oatmeal bath: If your rash is too large to cover with wet washcloths, take 3 or 4 cornstarch baths daily  Mix 1 pound of cornstarch with a little water to make a paste  Add the paste to a tub full of water and mix well  You may also use colloidal oatmeal in the bath water  Use lukewarm water  Avoid hot water because it may cause your itching to increase  Prevent a poison ivy rash in the future:   · Wear skin protection:  Wear long pants, a long-sleeved shirt, and gloves  Use a skin block lotion to protect your skin from poison ivy oil  You can find this at a drugstore without a prescription  · Wash clothing after possible exposure: If you think you have been near a poison ivy plant, wash the clothes you were wearing separately from other clothes  Rinse the washing machine well after you take the clothes out  Scrub boots and shoes with warm, soapy water  Dry clean items and clothing that you cannot wash in water  Poison ivy oil is sticky and can stay on surfaces for a long time  It can cause a new rash even years later  · Bathe your pet:  Use warm water and shampoo on your pet's fur  This will prevent the spread of oil to your skin, car, and home  Wear long sleeves, long pants, and gloves while washing pets or any items that may have oil on them      · Reduce exposure to poison ivy:  Do not touch plants that look like poison ivy  Keep your yard free of poison ivy  While protecting your skin, remove the plant and the roots  Place them in a plastic bag and seal the bag tightly  · Do not burn poison ivy plants: This can spread the oil through the air  If you breathe the oil into your lungs, you could have swelling and serious breathing problems  Oil that clings to the fire yi can land on your skin and cause a rash  Contact your healthcare provider if:   · You have pus, soft yellow scabs, or tenderness on the rash  · The itching gets worse or keeps you awake at night  · The rash covers more than 1/4 of your skin or spreads to your eyes, mouth, or genital area  · The rash is not better after 2 to 3 weeks  · You have tender, swollen glands on the sides of your neck  · You have swelling in your arms and legs  · You have questions or concerns about your condition or care  Return to the emergency department if:   · You have a fever  · You have redness, swelling, and tenderness around the rash  · You have trouble breathing  © 2017 [facility] Information is for End User's use only and may not be sold, redistributed or otherwise used for commercial purposes  All illustrations and images included in CareNotes® are the copyrighted property of [employer]  or Alex Parrish  The above information is an  only  It is not intended as medical advice for individual conditions or treatments  Talk to your doctor, nurse or pharmacist before following any medical regimen to see if it is safe and effective for you

## 2020-09-04 ENCOUNTER — TELEPHONE (OUTPATIENT)
Dept: SURGERY | Facility: HOSPITAL | Age: 75
End: 2020-09-04

## 2020-09-08 ENCOUNTER — OFFICE VISIT (OUTPATIENT)
Dept: LAB | Facility: HOSPITAL | Age: 75
End: 2020-09-08
Attending: PHYSICIAN ASSISTANT
Payer: MEDICARE

## 2020-09-08 ENCOUNTER — HOSPITAL ENCOUNTER (OUTPATIENT)
Dept: RADIOLOGY | Facility: HOSPITAL | Age: 75
Discharge: HOME | End: 2020-09-08
Attending: PHYSICIAN ASSISTANT
Payer: MEDICARE

## 2020-09-08 DIAGNOSIS — Z01.812 PRE-OPERATIVE LABORATORY EXAMINATION: ICD-10-CM

## 2020-09-08 DIAGNOSIS — E78.2 MIXED HYPERLIPIDEMIA: ICD-10-CM

## 2020-09-08 DIAGNOSIS — I10 HYPERTENSION, UNSPECIFIED TYPE: ICD-10-CM

## 2020-09-08 DIAGNOSIS — Z01.812 PRE-OPERATIVE LABORATORY EXAMINATION: Primary | ICD-10-CM

## 2020-09-08 LAB
ABO GROUP BLD: NORMAL
ABO GROUP BLD: NORMAL
ALBUMIN SERPL BCP-MCNC: 4.2 G/DL (ref 3.5–5.7)
ALP SERPL-CCNC: 59 U/L (ref 55–165)
ALT SERPL W P-5'-P-CCNC: 20 U/L (ref 7–52)
ANION GAP SERPL CALCULATED.3IONS-SCNC: 8 MMOL/L (ref 4–13)
APTT PPP: 24 SECONDS (ref 23–37)
AST SERPL W P-5'-P-CCNC: 17 U/L (ref 13–39)
ATRIAL RATE: 63 BPM
BASOPHILS # BLD AUTO: 0 THOUSANDS/ΜL (ref 0–0.1)
BASOPHILS NFR BLD AUTO: 0 % (ref 0–2)
BILIRUB SERPL-MCNC: 0.4 MG/DL (ref 0.2–1)
BLD GP AB SCN SERPL QL: NEGATIVE
BUN SERPL-MCNC: 23 MG/DL (ref 7–25)
CALCIUM SERPL-MCNC: 9.3 MG/DL (ref 8.6–10.5)
CHLORIDE SERPL-SCNC: 100 MMOL/L (ref 98–107)
CHOLEST SERPL-MCNC: 159 MG/DL (ref 0–200)
CO2 SERPL-SCNC: 28 MMOL/L (ref 21–31)
CREAT SERPL-MCNC: 1.13 MG/DL (ref 0.6–1.2)
CRP SERPL QL: <5 MG/L
EOSINOPHIL # BLD AUTO: 0 THOUSAND/ΜL (ref 0–0.61)
EOSINOPHIL NFR BLD AUTO: 0 % (ref 0–5)
ERYTHROCYTE [DISTWIDTH] IN BLOOD BY AUTOMATED COUNT: 13.3 % (ref 11.5–14.5)
EST. AVERAGE GLUCOSE BLD GHB EST-MCNC: 108 MG/DL
FERRITIN SERPL-MCNC: 527 NG/ML (ref 8–388)
GFR SERPL CREATININE-BSD FRML MDRD: 48 ML/MIN/1.73SQ M
GLUCOSE P FAST SERPL-MCNC: 91 MG/DL (ref 65–99)
HBA1C MFR BLD: 5.4 %
HCT VFR BLD AUTO: 35.7 % (ref 42–47)
HDLC SERPL-MCNC: 62 MG/DL
HGB BLD-MCNC: 12.4 G/DL (ref 12–16)
INR PPP: 0.98 (ref 0.84–1.19)
IRON SATN MFR SERPL: 38 %
IRON SERPL-MCNC: 93 UG/DL (ref 50–170)
LDLC SERPL CALC-MCNC: 69 MG/DL (ref 0–100)
LYMPHOCYTES # BLD AUTO: 2.4 THOUSANDS/ΜL (ref 0.6–4.47)
LYMPHOCYTES NFR BLD AUTO: 23 % (ref 21–51)
MCH RBC QN AUTO: 31.3 PG (ref 26–34)
MCHC RBC AUTO-ENTMCNC: 34.6 G/DL (ref 31–37)
MCV RBC AUTO: 90 FL (ref 81–99)
MONOCYTES # BLD AUTO: 0.8 THOUSAND/ΜL (ref 0.17–1.22)
MONOCYTES NFR BLD AUTO: 8 % (ref 2–12)
NEUTROPHILS # BLD AUTO: 6.9 THOUSANDS/ΜL (ref 1.4–6.5)
NEUTS SEG NFR BLD AUTO: 69 % (ref 42–75)
NONHDLC SERPL-MCNC: 97 MG/DL
PLATELET # BLD AUTO: 204 THOUSANDS/UL (ref 149–390)
PMV BLD AUTO: 10 FL (ref 8.6–11.7)
POTASSIUM SERPL-SCNC: 3.2 MMOL/L (ref 3.5–5.5)
PROT SERPL-MCNC: 6.3 G/DL (ref 6.4–8.9)
PROTHROMBIN TIME: 12.9 SECONDS (ref 11.6–14.5)
QRS AXIS: 18 DEGREES
QRSD INTERVAL: 90 MS
QT INTERVAL: 480 MS
QTC INTERVAL: 487 MS
RBC # BLD AUTO: 3.96 MILLION/UL (ref 3.9–5.2)
RH BLD: POSITIVE
RH BLD: POSITIVE
SODIUM SERPL-SCNC: 136 MMOL/L (ref 134–143)
SPECIMEN EXPIRATION DATE: NORMAL
T WAVE AXIS: 45 DEGREES
TIBC SERPL-MCNC: 243 UG/DL (ref 250–450)
TRIGL SERPL-MCNC: 139 MG/DL (ref 44–166)
VENTRICULAR RATE: 62 BPM
WBC # BLD AUTO: 10.1 THOUSAND/UL (ref 4.8–10.8)

## 2020-09-08 PROCEDURE — 80053 COMPREHEN METABOLIC PANEL: CPT

## 2020-09-08 PROCEDURE — 83550 IRON BINDING TEST: CPT

## 2020-09-08 PROCEDURE — 93010 ELECTROCARDIOGRAM REPORT: CPT | Performed by: INTERNAL MEDICINE

## 2020-09-08 PROCEDURE — 93005 ELECTROCARDIOGRAM TRACING: CPT

## 2020-09-08 PROCEDURE — 85610 PROTHROMBIN TIME: CPT

## 2020-09-08 PROCEDURE — 82728 ASSAY OF FERRITIN: CPT

## 2020-09-08 PROCEDURE — 86140 C-REACTIVE PROTEIN: CPT

## 2020-09-08 PROCEDURE — 36415 COLL VENOUS BLD VENIPUNCTURE: CPT

## 2020-09-08 PROCEDURE — 85025 COMPLETE CBC W/AUTO DIFF WBC: CPT

## 2020-09-08 PROCEDURE — 80061 LIPID PANEL: CPT

## 2020-09-08 PROCEDURE — 85730 THROMBOPLASTIN TIME PARTIAL: CPT

## 2020-09-08 PROCEDURE — 83036 HEMOGLOBIN GLYCOSYLATED A1C: CPT

## 2020-09-08 PROCEDURE — 71046 X-RAY EXAM CHEST 2 VIEWS: CPT

## 2020-09-08 PROCEDURE — 83540 ASSAY OF IRON: CPT

## 2020-09-08 PROCEDURE — 86900 BLOOD TYPING SEROLOGIC ABO: CPT

## 2020-09-08 PROCEDURE — 86901 BLOOD TYPING SEROLOGIC RH(D): CPT

## 2020-09-08 PROCEDURE — 86850 RBC ANTIBODY SCREEN: CPT

## 2020-09-11 DIAGNOSIS — E87.6 HYPOKALEMIA: Primary | ICD-10-CM

## 2020-09-11 RX ORDER — POTASSIUM CHLORIDE 20 MEQ/1
20 TABLET, EXTENDED RELEASE ORAL DAILY
Qty: 30 TABLET | Refills: 0 | Status: SHIPPED | OUTPATIENT
Start: 2020-09-11 | End: 2020-10-20

## 2020-09-16 ENCOUNTER — TELEPHONE (OUTPATIENT)
Dept: OBGYN CLINIC | Facility: HOSPITAL | Age: 75
End: 2020-09-16

## 2020-09-17 NOTE — TELEPHONE ENCOUNTER
Preoperative Elective Admission Assessment- spoke to patient       Living Situation: Pt reports living at home with her , Nedra [place]: Pt reports living in a ranch style home with step in bath tub, with grab bars                      Steps:5 to enter the home    First Floor Setup: Once in the home  Post-op Caregiver:  Herson    Post-op Transport: , Herson    Outpatient Physical Therapy Site:[place]    DME: Pt reports having a cane, denies RW or BSC  Patient's Current Level of Function:Pt reports being independent with ADLS and Ambulation  Medication Management: Pt self manages daily medications using a pillbox                      Preferred Pharmacy: [address] [place] [address]                    Blood Management Vitamins: Pt reports taking the folic acid, vitamin C, Iron and the multivitamin                      Post-op anticoagulant:TBD     DC Plan:Pt educated that our goal, if at all possible, is to appropriately discharge patient based off their post-op function while striving to maintain maximal independence  If possible, the goal is to discharge patient to home and for them to attend outpatient physical therapy                     Barriers to DC identified preoperatively:     BMI: 28 25     [employer]: Pt denied enrollment    Patient Education: Pt educated on post op pain, early mobilization (POD0), indication/use of incentive spirometer (10x/hr while awake), and indication for/use of foot/leg pumps  pt encouraged to call me with questions, concerns or issues

## 2020-09-18 ENCOUNTER — OFFICE VISIT (OUTPATIENT)
Dept: FAMILY MEDICINE CLINIC | Facility: CLINIC | Age: 75
End: 2020-09-18
Payer: MEDICARE

## 2020-09-18 ENCOUNTER — APPOINTMENT (OUTPATIENT)
Dept: LAB | Facility: CLINIC | Age: 75
End: 2020-09-18
Payer: MEDICARE

## 2020-09-18 VITALS
TEMPERATURE: 96.6 F | HEIGHT: 67 IN | WEIGHT: 179 LBS | SYSTOLIC BLOOD PRESSURE: 130 MMHG | DIASTOLIC BLOOD PRESSURE: 78 MMHG | BODY MASS INDEX: 28.09 KG/M2 | HEART RATE: 74 BPM | OXYGEN SATURATION: 99 %

## 2020-09-18 DIAGNOSIS — E87.6 HYPOKALEMIA: ICD-10-CM

## 2020-09-18 DIAGNOSIS — Z01.818 PREOPERATIVE CLEARANCE: Primary | ICD-10-CM

## 2020-09-18 DIAGNOSIS — Z23 NEED FOR INFLUENZA VACCINATION: ICD-10-CM

## 2020-09-18 LAB
ANION GAP SERPL CALCULATED.3IONS-SCNC: 4 MMOL/L (ref 4–13)
BUN SERPL-MCNC: 15 MG/DL (ref 5–25)
CALCIUM SERPL-MCNC: 9 MG/DL (ref 8.3–10.1)
CHLORIDE SERPL-SCNC: 106 MMOL/L (ref 100–108)
CO2 SERPL-SCNC: 29 MMOL/L (ref 21–32)
CREAT SERPL-MCNC: 1.05 MG/DL (ref 0.6–1.3)
GFR SERPL CREATININE-BSD FRML MDRD: 53 ML/MIN/1.73SQ M
GLUCOSE P FAST SERPL-MCNC: 93 MG/DL (ref 65–99)
POTASSIUM SERPL-SCNC: 3.5 MMOL/L (ref 3.5–5.3)
SODIUM SERPL-SCNC: 139 MMOL/L (ref 136–145)

## 2020-09-18 PROCEDURE — G0008 ADMIN INFLUENZA VIRUS VAC: HCPCS

## 2020-09-18 PROCEDURE — 36415 COLL VENOUS BLD VENIPUNCTURE: CPT

## 2020-09-18 PROCEDURE — 90662 IIV NO PRSV INCREASED AG IM: CPT

## 2020-09-18 PROCEDURE — 99215 OFFICE O/P EST HI 40 MIN: CPT | Performed by: PHYSICIAN ASSISTANT

## 2020-09-18 PROCEDURE — 80048 BASIC METABOLIC PNL TOTAL CA: CPT

## 2020-09-18 RX ORDER — CLINDAMYCIN HYDROCHLORIDE 150 MG/1
CAPSULE ORAL
COMMUNITY
Start: 2020-09-06

## 2020-09-18 NOTE — PROGRESS NOTES
PRE-OPERATIVE EVALUATION  [facility]    NAME: Rosangela Donovan  AGE: 70 y o  SEX: female  : 1945     DATE: 2020    Internal Medicine Pre-Operative Evaluation      Chief Complaint: Pre-operative Evaluation     Chief Complaint   Patient presents with    Pre-op Exam     Total Right knee replacemnet on 10/7/20 at [facility], Aware of mammo due  History of Present Illness:     Rosangela Donovan is a 70 y o  female who presents to the office today for a preoperative consultation at the request of Dr Manuel Most  Planned anesthesia is general  Patient has a bleeding risk of: no recent abnormal bleeding  Patient does not have objections to receiving blood products if needed  Current anti-platelet/anti-coagulation medications that the patient is prescribed includes: none        Assessment of Chronic Conditions:   HLD - on statin   Depression/anxiety - stable on wellbutrin  HTN - stable on current regimen   Mild persistent asthma - stable without exacerbation   GERD/IBS - on ppi, utd on colonoscopy and EGD; no longer on welchol   CKD - baseline Cr 1 1 and GFR mid 40s, unchanged  MAITE - not always compliant with cpap, makes her mouth dry despite humidity     Assessment of Cardiac Risk:  · Denies unstable or severe angina or MI in the last 6 weeks or history of stent placement in the last year   · Denies decompensated heart failure (e g  New onset heart failure, NYHA functional class IV heart failure, or worsening existing heart failure)  · Denies significant arrhythmias such as high grade AV block, symptomatic ventricular arrhythmia, newly recognized ventricular tachycardia, supraventricular tachycardia with resting heart rate >100, or symptomatic bradycardia  · Denies severe heart valve disease including aortic stenosis or symptomatic mitral stenosis     Exercise Capacity:  · Able to walk 4 blocks without symptoms?: Difficult to assess due to knee, can walk 5000 steps without issue though    Prior Anesthesia Reactions: No     Personal history of venous thromboembolic disease? No    History of steroid use for >2 weeks within last year? No     Review of Systems:     Review of Systems   Constitutional: Negative  Eyes: Visual disturbance: glasses  Respiratory: Negative  Shortness of breath: asthma stable  Cardiovascular: Negative  Gastrointestinal: Negative  IBS well controlled     Genitourinary: Negative for difficulty urinating, dysuria, flank pain and urgency  Frequency: more often at night    Musculoskeletal: Positive for arthralgias and joint swelling  Skin: Negative  Neurological: Negative for dizziness, syncope, weakness and headaches  Numbness: tingling, sharp pain into her R wrist after blood draw last week only when turns wrist a certain way  Psychiatric/Behavioral: Dysphoric mood: stable  Nervous/anxious: stable  Current Problem List:     Patient Active Problem List   Diagnosis    Age-related nuclear cataract, bilateral    Allergic rhinitis    Anemia    Anxiety disorder    Benign essential hypertension    Central sleep apnea    Irritable bowel syndrome with diarrhea    CKD (chronic kidney disease), stage III ([facility])    Depression with anxiety    Dyslipidemia    Hypoproteinemia ([facility])    Loss of height    Mild persistent asthma without complication    Nephrosclerosis    Osteoarthritis of left knee    Osteopenia    Overactive bladder    Primary insomnia    Vitamin B12 deficiency    Vitamin D deficiency    Chondromalacia patellae    Trochanteric bursitis    Gastroesophageal reflux disease without esophagitis    Primary osteoarthritis of right knee       Allergies:      Allergies   Allergen Reactions    Clarithromycin GI Intolerance     Reaction Date: 12Apr2011;     Estradiol      Annotation - [id number]: ELEVATES BP    Hydrocodone Hives    Hydrocodone-Acetaminophen Rash    Meloxicam Edema     Feet and ankles swell    Tramadol Other (See Comments)     red,itchy    Codeine      Reaction Date: 12Apr2011;        Physical Exam:       Current Outpatient Medications:     albuterol (2 5 mg/3 mL) 0 083 % nebulizer solution, Inhale 1 each every 4 (four) hours as needed , Disp: , Rfl:     albuterol (VENTOLIN HFA) 90 mcg/act inhaler, Inhale 2 puffs every 4 (four) hours as needed , Disp: , Rfl:     ascorbic acid (VITAMIN C) 500 MG tablet, Take 1 tablet (500 mg total) by mouth 2 (two) times a day, Disp: 60 tablet, Rfl: 0    atorvastatin (LIPITOR) 10 mg tablet, TAKE 1 TABLET EVERY DAY, Disp: 90 tablet, Rfl: 1    buPROPion (WELLBUTRIN XL) 150 mg 24 hr tablet, TAKE 1 TABLET EVERY DAY, Disp: 90 tablet, Rfl: 1    Calcium Carbonate (CALTRATE 600) 1500 (600 Ca) MG TABS, Take 1 tablet by mouth 2 (two) times a day, Disp: , Rfl:     Cetirizine HCl (ZYRTEC ALLERGY PO), Take 1 tablet by mouth, Disp: , Rfl:     escitalopram (LEXAPRO) 20 mg tablet, TAKE 1 TABLET EVERY DAY, Disp: 90 tablet, Rfl: 1    esomeprazole (NexIUM) 40 MG capsule, Take 1 capsule (40 mg total) by mouth daily (Patient taking differently: Take 20 mg by mouth daily ), Disp: 30 capsule, Rfl: 3    ferrous sulfate 324 (65 Fe) mg, Take 1 tablet (324 mg total) by mouth 2 (two) times a day before meals, Disp: 60 tablet, Rfl: 0    fluticasone (FLONASE) 50 mcg/act nasal spray, into each nostril, Disp: , Rfl:     fluticasone-salmeterol (ADVAIR DISKUS) 100-50 mcg/dose inhaler, Inhale 1 puff every 12 (twelve) hours, Disp: 50 Inhaler, Rfl: 0    folic acid (FOLVITE) 1 mg tablet, Take 1 tablet (1 mg total) by mouth daily, Disp: 30 tablet, Rfl: 0    Ginger, Zingiber officinalis, (GINGER EXTRACT PO), Take by mouth, Disp: , Rfl:     losartan-hydrochlorothiazide (HYZAAR) 100-12 5 MG per tablet, TAKE 1 TABLET EVERY DAY, Disp: 90 tablet, Rfl: 1    Multiple Vitamins-Minerals (multivitamin with minerals) tablet, Take 1 tablet by mouth daily, Disp: 30 tablet, Rfl: 0    potassium chloride (K-DUR,KLOR-CON) 20 mEq tablet, Take 1 tablet (20 mEq total) by mouth daily, Disp: 30 tablet, Rfl: 0    Probiotic Product (PROBIOTIC DAILY PO), Take by mouth, Disp: , Rfl:     clindamycin (CLEOCIN) 150 mg capsule, TAKE FOUR CAPSULES BY MOUTH ONE HOUR BEFORE APPOINTMENT, Disp: , Rfl:     Past Medical History:       Past Medical History:   Diagnosis Date    Asthma     Chronic kidney disease     CKD stage 3    Hyperlipidemia     Hypertension     Nasal polyps         Past Surgical History:   Procedure Laterality Date    COLONOSCOPY      DILATION AND CURETTAGE OF UTERUS      HYSTEROSCOPY      of uterus  d&c 1997    HYSTEROSCOPY W/ POLYPECTOMY      2000 and 2001  resolved    NOSE SURGERY      nasal endoscopy polypectomy    NOSE SURGERY      CT ESOPHAGOGASTRODUODENOSCOPY TRANSORAL DIAGNOSTIC N/A 2/6/2019    Procedure: ESOPHAGOGASTRODUODENOSCOPY (EGD); Surgeon: Abelardo Lake MD;  Location: [facility];   Service: Gastroenterology    TONSILLECTOMY      TOTAL KNEE ARTHROPLASTY Left         Family History   Problem Relation Age of Onset    Heart attack Mother     Osteoporosis Mother     Uterine cancer Mother     Heart disease Mother         Social History     Socioeconomic History    Marital status: /Civil Union     Spouse name: Not on file    Number of children: Not on file    Years of education: Not on file    Highest education level: Not on file   Occupational History    Not on file   Social Needs    Financial resource strain: Not on file    Food insecurity     Worry: Not on file     Inability: Not on file    Transportation needs     Medical: No     Non-medical: No   Tobacco Use    Smoking status: Never Smoker    Smokeless tobacco: Never Used   Substance and Sexual Activity    Alcohol use: Not Currently     Comment: social    Drug use: No    Sexual activity: Not Currently   Lifestyle    Physical activity     Days per week: 0 days     Minutes per session: 0 min    Stress: Not on file   Relationships  Social connections     Talks on phone: Not on file     Gets together: Not on file     Attends Buddhism service: Not on file     Active member of club or organization: Not on file     Attends meetings of clubs or organizations: Not on file     Relationship status: Not on file    Intimate partner violence     Fear of current or ex partner: Not on file     Emotionally abused: Not on file     Physically abused: Not on file     Forced sexual activity: Not on file   Other Topics Concern    Not on file   Social History Narrative    Uses safety equipment  Seat belt        Physical Exam:     Vitals:    09/18/20 1031   BP: 130/78   Pulse: 74   Temp: (!) 96 6 °F (35 9 °C)   SpO2: 99%     Physical Exam  Constitutional:       General: She is not in acute distress  Appearance: Normal appearance  Comments: overweight   HENT:      Head: Normocephalic and atraumatic  Right Ear: Tympanic membrane, ear canal and external ear normal       Left Ear: Tympanic membrane, ear canal and external ear normal       Nose: Nose normal       Mouth/Throat:      Mouth: Mucous membranes are moist       Pharynx: Oropharynx is clear  Eyes:      Conjunctiva/sclera: Conjunctivae normal       Pupils: Pupils are equal, round, and reactive to light  Cardiovascular:      Rate and Rhythm: Normal rate and regular rhythm  Pulses: Normal pulses  Heart sounds: No murmur  Pulmonary:      Effort: Pulmonary effort is normal  No respiratory distress  Abdominal:      General: Abdomen is flat  There is no distension  Musculoskeletal:      Right lower leg: Right lower leg edema: slight, nonpitting since K injury  Left lower leg: No edema  Skin:     General: Skin is warm and dry  Findings: No rash  Neurological:      General: No focal deficit present  Mental Status: She is alert and oriented to person, place, and time  Cranial Nerves: No cranial nerve deficit  Motor: No weakness     Psychiatric: Mood and Affect: Mood normal          Behavior: Behavior normal           Data:     Pre-operative work-up    Laboratory Results: I have personally reviewed the pertinent laboratory results/reports    K - 3 2     EKG: I have personally reviewed pertinent reports  Chest x-ray: I have personally reviewed pertinent reports  Previous cardiopulmonary studies within the past year:  · Echocardiogram: EF 60%, 2018       Assessment & Recommendations:     1  Preoperative clearance     2  Need for influenza vaccination  influenza vaccine, high-dose, PF 0 7 mL (FLUZONE HIGH-DOSE)   3  BMI 28 0-28 9,adult         Pre-Op Evaluation Assessment  70 y o  female with planned surgery  Known risk factors for perioperative complications: Chronic pulmonary disease  Renal dysfunction  Cardiac Risk Estimation: per the Revised Cardiac Risk Index (Circ  100:1043, 1999), the patient's risk factors for cardiac complications include none, putting her in: RCI RISK CLASS II (1 risk factor, risk of major cardiac compl  appr  1 3%)  Current medications which may produce withdrawal symptoms if withheld perioperatively: lexapro    Pre-Op Evaluation Plan  1  Further preoperative workup as follows:   - None; no further preoperative work-up is required  However she is going for repeat bmp today to recheck K  She did not get out message about the results and to  potassium supplement as was on vacation  2  Medication Management/Recommendations:   - None, continue medication regimen including morning of surgery, with sip of water  - Patient has been instructed to avoid herbs or non-directed vitamins the week prior to surgery to ensure no drug interactions with perioperative surgical and anesthetic medications  - Patient should continue his statin medication up through and including the day of surgery    - Patient has been instructed to avoid aspirin containing medications or non-steroidal anti-inflammatory drugs for the week preceding surgery  3  Prophylaxis for cardiac events with perioperative beta-blockers: should be considered, specific regimen per anesthesia  4  Patient requires further consultation with: None    Clearance  Patient is CLEARED for surgery without any additional cardiac testing       Mil Barth [facility]  [address] [facility] [address]  Phone#  [phone]  Fax#  [phone]

## 2020-09-29 DIAGNOSIS — Z11.59 SPECIAL SCREENING EXAMINATION FOR UNSPECIFIED VIRAL DISEASE: ICD-10-CM

## 2020-09-29 DIAGNOSIS — Z01.812 PRE-OPERATIVE LABORATORY EXAMINATION: ICD-10-CM

## 2020-09-29 PROCEDURE — U0003 INFECTIOUS AGENT DETECTION BY NUCLEIC ACID (DNA OR RNA); SEVERE ACUTE RESPIRATORY SYNDROME CORONAVIRUS 2 (SARS-COV-2) (CORONAVIRUS DISEASE [COVID-19]), AMPLIFIED PROBE TECHNIQUE, MAKING USE OF HIGH THROUGHPUT TECHNOLOGIES AS DESCRIBED BY CMS-2020-01-R: HCPCS | Performed by: PHYSICIAN ASSISTANT

## 2020-09-29 NOTE — PROGRESS NOTES
PT Evaluation     Today's date: 2020  Patient name: Sharan Sanchez  : 1945  MRN: [id number]  Referring provider: Vanessa Salmon  Dx:   Encounter Diagnosis     ICD-10-CM    1  Primary osteoarthritis of right knee  M17 11                   Assessment  Assessment details: Sharan Sanchez is a 70 y o  female with a history of asthma, anxiety, CKD, hyperlipidemia, HTN, insomnia, depression, anemia, and osteopenia  that presents for a moderate complexity pre operative physical therapy initial evaluation  The patient is scheduled for a R TKA on 10/7/2020 for Dr Hal Lu  The patient demonstrates signs and symptoms consistent with primary OA right knee  During the examination the patient demonstrated decreased R LE strength, decreased R knee ROM, gait dysfunction, and R knee pain  The patient's impairments are causing the following functional limitations: difficulty with prolonged standing, difficulty bending the knee, walking on unlevel surfaces, difficulty bike riding, difficulty squatting/kneeling, difficulty stair-climbing, and difficulty transferring from low surfaces     The patient's clinical presentation is evolving due to a number of participation restrictions, significant medial history, and functional limitation  The patient will benefit from skilled PT services after surgery to address impairments, work towards goals, and restore PLOF  Impairments: abnormal gait, abnormal or restricted ROM, activity intolerance, impaired balance, impaired physical strength, lacks appropriate home exercise program and pain with function  Functional limitations: difficulty with prolonged standing, difficulty bending the knee, walking on unlevel surfaces, difficulty bike riding, difficulty squatting/kneeling, difficulty stair-climbing, and difficulty transferring from low surfaces    Symptom irritability: lowBarriers to therapy: Significant medical Hx  Understanding of Dx/Px/POC: good Prognosis: good  Prognosis details: Significant medical Hx    Goals  GOALS TO BE ACHIEVED BY THE END OF THE PRE-OP VISIT  1  Patient have all questions answered / receive pre operative education regarding precautions/wound care/ADL's  MET   2  Patient to be independent with her pre operative / post operative exercises to perform pre surgery  MET       GOALS TO BE SET AFTER SURGERY    STG: Achieve in 4-6 weeks  1  Patient's R knee pain at worst less than 1-2/10 to allow for proper gait  2   Patient's R knee ROM improve by 10-15 degrees to improve squatting  3   R LE MMT improve to > 4+/5 all motions tested to improve ADL/recreational activities  4   Timed up and Go score improve to less than 14 seconds to indicate decreased falls risk/improved balance  LTG:  Achieve in 6-12 weeks  1  Patient's knee FOTO score improve to anticipated level to indicate a return of normal function  2   Patient achieve personal goal of returning to biking, hiking, and walking on beach  3  Patient to achieve independence with home exercise plan  Plan  Plan details: RE-ASSESS 1X/MONTH AFTER SURGERY  Patient would benefit from: skilled physical therapy  Planned modality interventions: unattended electrical stimulation, thermotherapy: hydrocollator packs and cryotherapy  Other planned modality interventions: IAS  Planned therapy interventions: joint mobilization, manual therapy, massage, self care, strengthening, stretching, therapeutic activities, therapeutic exercise, gait training, home exercise program, abdominal trunk stabilization, balance and balance/weight bearing training  Frequency: 1-3x/wk    Duration in weeks: 12  Plan of Care beginning date: 9/30/2020  Plan of Care expiration date: 12/29/2020  Treatment plan discussed with: patient and PTA        Subjective Evaluation    History of Present Illness  Date of onset: 7/15/2020  Mechanism of injury: surgery  Mechanism of injury: Alison Santos is a 70 y o  female that presents to outpatient physical therapy with complaints of right knee pain and difficulty ambulating  The patient reports she has been having right knee trouble for a long time but has been putting this off due to having a L TKA that she was not happy with  The patient presents pre-operatively to get objective measures, learn exercises, and receive education on the  TKA surgery/self care  The patient's main goal for physical therapy is to learn her exercises and feel prepared for the upcoming surgery  The patient's goals after surgery are to return to biking, hiking, and walking on the beach       Pain  Current pain ratin  At best pain ratin  At worst pain ratin  Location: medial/lateral right knee joint   Quality: dull ache  Relieving factors: rest  Aggravating factors: stair climbing and sitting  Progression: worsening    Social Support  Steps to enter house: yes  5  Stairs in house: no   Lives in: [place] house  Lives with: spouse    Employment status: not working  Hand dominance: right    Treatments  Current treatment: physical therapy  Patient Goals  Patient goals for therapy: decreased pain, improved balance, increased motion, increased strength, independence with ADLs/IADLs and return to sport/leisure activities  Patient goal: hike, walk on beach, and bike without R knee difficulty        Objective     Observations     Additional Observation Details  The patient was educated on caring for post operative incisions and watching for signs of infection to prepare for R TKA surgery  All patient questions related to the incision care and infection signs were answered at this time  We also reviewed safe use of a RW for ambulation, post operative movement contraindications/precautions, and ADL's  The patient accepted and understood all information - patient was advised to call if any other questions arise pre-surgery        Palpation     Additional Palpation Details  No Tenderness    Tenderness     Additional Tenderness Details  No tenderness to touch    Neurological Testing     Sensation     Knee   Left Knee   Intact: light touch    Right Knee   Intact: light touch     Comments   Right light touch: intermittent L4 parasthesia  Active Range of Motion   Left Knee   Flexion: 125 degrees   Extension: -2 degrees     Right Knee   Flexion: 88 degrees   Extension: -15 degrees     Passive Range of Motion   Left Knee   Flexion: 127 degrees   Extension: 0 degrees     Right Knee   Flexion: 90 degrees   Extension: -12 degrees     Mobility   Patellar Mobility:   Left Knee   WFL: medial, lateral, superior and inferior  Right Knee   Hypomobile: medial, lateral, superior and inferior     Patellar Static Positioning   Left Knee: WFL  Right Knee: jade    Strength/Myotome Testing     Left Hip   Planes of Motion   Flexion: 5  Extension: 5  Abduction: 5  Adduction: 5    Right Hip   Planes of Motion   Flexion: 4-  Extension: 3+  Abduction: 4-  Adduction: 4-    Left Knee   Flexion: 5  Extension: 5  Quadriceps contraction: good    Right Knee   Flexion: 3+  Extension: 3+  Quadriceps contraction: fair    Tests     Additional Tests Details  TUG: 15 seconds without AD    GAIT: (+) antalgic gait - R LE limping, unequal step lengths, decreased right knee flexion throughout the gait cycle  The patient received instruction/demonstration on proper walking techniques with RW and techniques up/down the stair properly  Patient has no questions/concerns      KOOS: Symptoms: 79  Pain: 76  Function, daily livin  Sports,recreation: 40  QOL: 50    General Comments:      Knee Comments  Patient wore jeans - unable to lift to get circumference of knee joint                 Re-Evaluation:10/9  PRECAUTIONS: NONE  Knee Specialty Daily Treatment Diary     Manual Therapy        MFR/STM/ IASTM        Hamstring stretch        Prone Quad stretch        Patellar mobs        Knee stretch on edge of mat Ther Ex 9/30       Biodex Bike        Heel slides flex/abd *x10 :05       Ankle Pumps x15       PROM knee        EXT BOARD        Prone knee flex        QS x10       SLR all planes        SAQ        LAQ x10       Hamstring stretch        Calf stretch        Standing hamstring curls        Gluteal Sets x10       Mini Squat                TKE        Total gym squats (deep)        Neuro Re-ed        SLB                Fitter board                [place]        GAIT Training        sidestepping        RW ambulation/ stair climbing instruction Done 10 mins       Heel-toe amb        Mirror walking        Ther Act        Amb up/down steps        Chair squats        Crate carry        Step ups            Modalities 9/30       CP R KNEE declined                                 The patient was given a new home exercise plan with written handout, pictures, and verbal instruction  The patient accepts and understands the new home activities

## 2020-09-30 ENCOUNTER — EVALUATION (OUTPATIENT)
Dept: PHYSICAL THERAPY | Facility: CLINIC | Age: 75
End: 2020-09-30
Payer: MEDICARE

## 2020-09-30 DIAGNOSIS — M17.11 PRIMARY OSTEOARTHRITIS OF RIGHT KNEE: Primary | ICD-10-CM

## 2020-09-30 PROCEDURE — 97116 GAIT TRAINING THERAPY: CPT | Performed by: PHYSICAL THERAPIST

## 2020-09-30 PROCEDURE — 97110 THERAPEUTIC EXERCISES: CPT | Performed by: PHYSICAL THERAPIST

## 2020-09-30 PROCEDURE — 97162 PT EVAL MOD COMPLEX 30 MIN: CPT | Performed by: PHYSICAL THERAPIST

## 2020-10-01 LAB — SARS-COV-2 RNA SPEC QL NAA+PROBE: NOT DETECTED

## 2020-10-05 PROCEDURE — NC001 PR NO CHARGE: Performed by: PHYSICIAN ASSISTANT

## 2020-10-07 ENCOUNTER — ANESTHESIA EVENT (OUTPATIENT)
Dept: PERIOP | Facility: HOSPITAL | Age: 75
DRG: 470 | End: 2020-10-07
Payer: MEDICARE

## 2020-10-07 ENCOUNTER — HOSPITAL ENCOUNTER (INPATIENT)
Facility: HOSPITAL | Age: 75
LOS: 2 days | Discharge: HOME HEALTH | DRG: 470 | End: 2020-10-09
Attending: ORTHOPAEDIC SURGERY | Admitting: ORTHOPAEDIC SURGERY
Payer: MEDICARE

## 2020-10-07 ENCOUNTER — ANESTHESIA (OUTPATIENT)
Dept: PERIOP | Facility: HOSPITAL | Age: 75
DRG: 470 | End: 2020-10-07
Payer: MEDICARE

## 2020-10-07 ENCOUNTER — APPOINTMENT (OUTPATIENT)
Dept: RADIOLOGY | Facility: HOSPITAL | Age: 75
DRG: 470 | End: 2020-10-07
Payer: MEDICARE

## 2020-10-07 VITALS — HEART RATE: 57 BPM

## 2020-10-07 DIAGNOSIS — M17.11 PRIMARY OSTEOARTHRITIS OF RIGHT KNEE: Primary | ICD-10-CM

## 2020-10-07 PROBLEM — K21.9 GASTROESOPHAGEAL REFLUX DISEASE WITHOUT ESOPHAGITIS: Chronic | Status: ACTIVE | Noted: 2018-12-19

## 2020-10-07 LAB
ABO GROUP BLD: NORMAL
BLD GP AB SCN SERPL QL: NEGATIVE
RH BLD: POSITIVE
SPECIMEN EXPIRATION DATE: NORMAL

## 2020-10-07 PROCEDURE — 0SRC0J9 REPLACEMENT OF RIGHT KNEE JOINT WITH SYNTHETIC SUBSTITUTE, CEMENTED, OPEN APPROACH: ICD-10-PCS | Performed by: ORTHOPAEDIC SURGERY

## 2020-10-07 PROCEDURE — 88305 TISSUE EXAM BY PATHOLOGIST: CPT | Performed by: PATHOLOGY

## 2020-10-07 PROCEDURE — C1776 JOINT DEVICE (IMPLANTABLE): HCPCS | Performed by: ORTHOPAEDIC SURGERY

## 2020-10-07 PROCEDURE — C1713 ANCHOR/SCREW BN/BN,TIS/BN: HCPCS | Performed by: ORTHOPAEDIC SURGERY

## 2020-10-07 PROCEDURE — 86901 BLOOD TYPING SEROLOGIC RH(D): CPT | Performed by: ORTHOPAEDIC SURGERY

## 2020-10-07 PROCEDURE — 88311 DECALCIFY TISSUE: CPT | Performed by: PATHOLOGY

## 2020-10-07 PROCEDURE — G9197 ORDER FOR CEPH: HCPCS | Performed by: ORTHOPAEDIC SURGERY

## 2020-10-07 PROCEDURE — 99222 1ST HOSP IP/OBS MODERATE 55: CPT | Performed by: FAMILY MEDICINE

## 2020-10-07 PROCEDURE — 27447 TOTAL KNEE ARTHROPLASTY: CPT | Performed by: ORTHOPAEDIC SURGERY

## 2020-10-07 PROCEDURE — 73560 X-RAY EXAM OF KNEE 1 OR 2: CPT

## 2020-10-07 PROCEDURE — 86900 BLOOD TYPING SEROLOGIC ABO: CPT | Performed by: ORTHOPAEDIC SURGERY

## 2020-10-07 PROCEDURE — 27447 TOTAL KNEE ARTHROPLASTY: CPT | Performed by: PHYSICIAN ASSISTANT

## 2020-10-07 PROCEDURE — 86850 RBC ANTIBODY SCREEN: CPT | Performed by: ORTHOPAEDIC SURGERY

## 2020-10-07 PROCEDURE — C9290 INJ, BUPIVACAINE LIPOSOME: HCPCS | Performed by: ANESTHESIOLOGY

## 2020-10-07 DEVICE — IMPLANTABLE DEVICE
Type: IMPLANTABLE DEVICE | Site: KNEE | Status: FUNCTIONAL
Brand: NEXGEN®

## 2020-10-07 DEVICE — IMPLANTABLE DEVICE: Type: IMPLANTABLE DEVICE | Site: KNEE | Status: FUNCTIONAL

## 2020-10-07 DEVICE — IMPLANTABLE DEVICE
Type: IMPLANTABLE DEVICE | Site: KNEE | Status: FUNCTIONAL
Brand: NEXGEN® LEGACY® GENDER SOLUTIONS™

## 2020-10-07 DEVICE — IMPLANTABLE DEVICE
Type: IMPLANTABLE DEVICE | Site: KNEE | Status: FUNCTIONAL
Brand: NEXGEN® LEGACY®

## 2020-10-07 RX ORDER — BUPIVACAINE HYDROCHLORIDE 2.5 MG/ML
INJECTION, SOLUTION EPIDURAL; INFILTRATION; INTRACAUDAL
Status: COMPLETED | OUTPATIENT
Start: 2020-10-07 | End: 2020-10-07

## 2020-10-07 RX ORDER — POVIDONE-IODINE 10 MG/G
OINTMENT TOPICAL AS NEEDED
Status: DISCONTINUED | OUTPATIENT
Start: 2020-10-07 | End: 2020-10-07 | Stop reason: HOSPADM

## 2020-10-07 RX ORDER — DOCUSATE SODIUM 100 MG/1
100 CAPSULE, LIQUID FILLED ORAL 2 TIMES DAILY
Status: DISCONTINUED | OUTPATIENT
Start: 2020-10-07 | End: 2020-10-09 | Stop reason: HOSPADM

## 2020-10-07 RX ORDER — LORATADINE 10 MG/1
10 TABLET ORAL DAILY
Status: DISCONTINUED | OUTPATIENT
Start: 2020-10-07 | End: 2020-10-09 | Stop reason: HOSPADM

## 2020-10-07 RX ORDER — ALBUTEROL SULFATE 2.5 MG/3ML
2.5 SOLUTION RESPIRATORY (INHALATION) EVERY 4 HOURS PRN
Status: DISCONTINUED | OUTPATIENT
Start: 2020-10-07 | End: 2020-10-09 | Stop reason: HOSPADM

## 2020-10-07 RX ORDER — ONDANSETRON 2 MG/ML
4 INJECTION INTRAMUSCULAR; INTRAVENOUS ONCE AS NEEDED
Status: DISCONTINUED | OUTPATIENT
Start: 2020-10-07 | End: 2020-10-07 | Stop reason: HOSPADM

## 2020-10-07 RX ORDER — CHLORHEXIDINE GLUCONATE 4 G/100ML
SOLUTION TOPICAL DAILY PRN
Status: DISCONTINUED | OUTPATIENT
Start: 2020-10-07 | End: 2020-10-07

## 2020-10-07 RX ORDER — BUPIVACAINE HYDROCHLORIDE 7.5 MG/ML
INJECTION, SOLUTION INTRASPINAL AS NEEDED
Status: DISCONTINUED | OUTPATIENT
Start: 2020-10-07 | End: 2020-10-07

## 2020-10-07 RX ORDER — CHLORHEXIDINE GLUCONATE 0.12 MG/ML
15 RINSE ORAL ONCE
Status: COMPLETED | OUTPATIENT
Start: 2020-10-07 | End: 2020-10-07

## 2020-10-07 RX ORDER — PANTOPRAZOLE SODIUM 40 MG/1
40 TABLET, DELAYED RELEASE ORAL
Status: DISCONTINUED | OUTPATIENT
Start: 2020-10-07 | End: 2020-10-09 | Stop reason: HOSPADM

## 2020-10-07 RX ORDER — FOLIC ACID 1 MG/1
1 TABLET ORAL DAILY
Status: DISCONTINUED | OUTPATIENT
Start: 2020-10-07 | End: 2020-10-09 | Stop reason: HOSPADM

## 2020-10-07 RX ORDER — ONDANSETRON 2 MG/ML
INJECTION INTRAMUSCULAR; INTRAVENOUS AS NEEDED
Status: DISCONTINUED | OUTPATIENT
Start: 2020-10-07 | End: 2020-10-07

## 2020-10-07 RX ORDER — FENTANYL CITRATE/PF 50 MCG/ML
50 SYRINGE (ML) INJECTION
Status: DISCONTINUED | OUTPATIENT
Start: 2020-10-07 | End: 2020-10-07 | Stop reason: HOSPADM

## 2020-10-07 RX ORDER — CEFAZOLIN SODIUM 2 G/50ML
2000 SOLUTION INTRAVENOUS ONCE
Status: COMPLETED | OUTPATIENT
Start: 2020-10-07 | End: 2020-10-07

## 2020-10-07 RX ORDER — FLUTICASONE PROPIONATE 50 MCG
2 SPRAY, SUSPENSION (ML) NASAL DAILY
Status: DISCONTINUED | OUTPATIENT
Start: 2020-10-07 | End: 2020-10-09 | Stop reason: HOSPADM

## 2020-10-07 RX ORDER — HYDROCHLOROTHIAZIDE 12.5 MG/1
12.5 TABLET ORAL DAILY
Status: DISCONTINUED | OUTPATIENT
Start: 2020-10-07 | End: 2020-10-09 | Stop reason: HOSPADM

## 2020-10-07 RX ORDER — DEXAMETHASONE SODIUM PHOSPHATE 4 MG/ML
INJECTION, SOLUTION INTRA-ARTICULAR; INTRALESIONAL; INTRAMUSCULAR; INTRAVENOUS; SOFT TISSUE AS NEEDED
Status: DISCONTINUED | OUTPATIENT
Start: 2020-10-07 | End: 2020-10-07

## 2020-10-07 RX ORDER — ALBUTEROL SULFATE 2.5 MG/3ML
2.5 SOLUTION RESPIRATORY (INHALATION) ONCE AS NEEDED
Status: DISCONTINUED | OUTPATIENT
Start: 2020-10-07 | End: 2020-10-07 | Stop reason: HOSPADM

## 2020-10-07 RX ORDER — FLUTICASONE FUROATE AND VILANTEROL 100; 25 UG/1; UG/1
1 POWDER RESPIRATORY (INHALATION)
Status: DISCONTINUED | OUTPATIENT
Start: 2020-10-07 | End: 2020-10-09 | Stop reason: HOSPADM

## 2020-10-07 RX ORDER — POTASSIUM CHLORIDE 20 MEQ/1
20 TABLET, EXTENDED RELEASE ORAL DAILY
Status: DISCONTINUED | OUTPATIENT
Start: 2020-10-07 | End: 2020-10-09 | Stop reason: HOSPADM

## 2020-10-07 RX ORDER — MIDAZOLAM HYDROCHLORIDE 2 MG/2ML
INJECTION, SOLUTION INTRAMUSCULAR; INTRAVENOUS AS NEEDED
Status: DISCONTINUED | OUTPATIENT
Start: 2020-10-07 | End: 2020-10-07

## 2020-10-07 RX ORDER — FERROUS SULFATE 325(65) MG
325 TABLET ORAL 2 TIMES DAILY WITH MEALS
Status: DISCONTINUED | OUTPATIENT
Start: 2020-10-07 | End: 2020-10-09 | Stop reason: HOSPADM

## 2020-10-07 RX ORDER — ONDANSETRON 2 MG/ML
4 INJECTION INTRAMUSCULAR; INTRAVENOUS EVERY 6 HOURS PRN
Status: DISCONTINUED | OUTPATIENT
Start: 2020-10-07 | End: 2020-10-09 | Stop reason: HOSPADM

## 2020-10-07 RX ORDER — ACETAMINOPHEN 325 MG/1
650 TABLET ORAL EVERY 4 HOURS PRN
Status: DISCONTINUED | OUTPATIENT
Start: 2020-10-07 | End: 2020-10-09 | Stop reason: HOSPADM

## 2020-10-07 RX ORDER — ESCITALOPRAM OXALATE 10 MG/1
20 TABLET ORAL DAILY
Status: DISCONTINUED | OUTPATIENT
Start: 2020-10-07 | End: 2020-10-09 | Stop reason: HOSPADM

## 2020-10-07 RX ORDER — OXYCODONE HYDROCHLORIDE AND ACETAMINOPHEN 5; 325 MG/1; MG/1
1 TABLET ORAL EVERY 4 HOURS PRN
Status: DISCONTINUED | OUTPATIENT
Start: 2020-10-07 | End: 2020-10-09 | Stop reason: HOSPADM

## 2020-10-07 RX ORDER — CEFAZOLIN SODIUM 1 G/50ML
1000 SOLUTION INTRAVENOUS EVERY 8 HOURS
Status: COMPLETED | OUTPATIENT
Start: 2020-10-07 | End: 2020-10-08

## 2020-10-07 RX ORDER — SODIUM CHLORIDE, SODIUM LACTATE, POTASSIUM CHLORIDE, CALCIUM CHLORIDE 600; 310; 30; 20 MG/100ML; MG/100ML; MG/100ML; MG/100ML
100 INJECTION, SOLUTION INTRAVENOUS CONTINUOUS
Status: DISCONTINUED | OUTPATIENT
Start: 2020-10-07 | End: 2020-10-07

## 2020-10-07 RX ORDER — PROPOFOL 10 MG/ML
INJECTION, EMULSION INTRAVENOUS CONTINUOUS PRN
Status: DISCONTINUED | OUTPATIENT
Start: 2020-10-07 | End: 2020-10-07

## 2020-10-07 RX ORDER — BUPROPION HYDROCHLORIDE 150 MG/1
150 TABLET ORAL DAILY
Status: DISCONTINUED | OUTPATIENT
Start: 2020-10-07 | End: 2020-10-09 | Stop reason: HOSPADM

## 2020-10-07 RX ORDER — ASCORBIC ACID 500 MG
500 TABLET ORAL 2 TIMES DAILY
Status: DISCONTINUED | OUTPATIENT
Start: 2020-10-07 | End: 2020-10-09 | Stop reason: HOSPADM

## 2020-10-07 RX ORDER — LOSARTAN POTASSIUM 50 MG/1
100 TABLET ORAL DAILY
Status: DISCONTINUED | OUTPATIENT
Start: 2020-10-07 | End: 2020-10-09 | Stop reason: HOSPADM

## 2020-10-07 RX ORDER — FENTANYL CITRATE 50 UG/ML
INJECTION, SOLUTION INTRAMUSCULAR; INTRAVENOUS AS NEEDED
Status: DISCONTINUED | OUTPATIENT
Start: 2020-10-07 | End: 2020-10-07

## 2020-10-07 RX ORDER — ATORVASTATIN CALCIUM 10 MG/1
10 TABLET, FILM COATED ORAL DAILY
Status: DISCONTINUED | OUTPATIENT
Start: 2020-10-07 | End: 2020-10-09 | Stop reason: HOSPADM

## 2020-10-07 RX ORDER — PROPOFOL 10 MG/ML
INJECTION, EMULSION INTRAVENOUS AS NEEDED
Status: DISCONTINUED | OUTPATIENT
Start: 2020-10-07 | End: 2020-10-07

## 2020-10-07 RX ORDER — ROPIVACAINE HYDROCHLORIDE 2 MG/ML
INJECTION, SOLUTION EPIDURAL; INFILTRATION; PERINEURAL
Status: COMPLETED | OUTPATIENT
Start: 2020-10-07 | End: 2020-10-07

## 2020-10-07 RX ORDER — MAGNESIUM HYDROXIDE/ALUMINUM HYDROXICE/SIMETHICONE 120; 1200; 1200 MG/30ML; MG/30ML; MG/30ML
30 SUSPENSION ORAL EVERY 6 HOURS PRN
Status: DISCONTINUED | OUTPATIENT
Start: 2020-10-07 | End: 2020-10-09 | Stop reason: HOSPADM

## 2020-10-07 RX ORDER — LACTOBACILLUS ACIDOPHILUS / LACTOBACILLUS BULGARICUS 100 MILLION CFU STRENGTH
1 GRANULES ORAL
Status: DISCONTINUED | OUTPATIENT
Start: 2020-10-07 | End: 2020-10-09 | Stop reason: HOSPADM

## 2020-10-07 RX ORDER — FONDAPARINUX SODIUM 2.5 MG/.5ML
2.5 INJECTION SUBCUTANEOUS DAILY
Status: DISCONTINUED | OUTPATIENT
Start: 2020-10-08 | End: 2020-10-09 | Stop reason: HOSPADM

## 2020-10-07 RX ORDER — HYDROMORPHONE HCL 110MG/55ML
0.5 PATIENT CONTROLLED ANALGESIA SYRINGE INTRAVENOUS EVERY 4 HOURS PRN
Status: DISCONTINUED | OUTPATIENT
Start: 2020-10-07 | End: 2020-10-09 | Stop reason: HOSPADM

## 2020-10-07 RX ADMIN — ESCITALOPRAM OXALATE 20 MG: 10 TABLET ORAL at 15:26

## 2020-10-07 RX ADMIN — PHENYLEPHRINE HYDROCHLORIDE 40 MCG/MIN: 10 INJECTION INTRAVENOUS at 09:53

## 2020-10-07 RX ADMIN — HYDROMORPHONE HYDROCHLORIDE 0.5 MG: 2 INJECTION, SOLUTION INTRAMUSCULAR; INTRAVENOUS; SUBCUTANEOUS at 20:28

## 2020-10-07 RX ADMIN — Medication 1 TABLET: at 15:26

## 2020-10-07 RX ADMIN — PANTOPRAZOLE SODIUM 40 MG: 20 TABLET, DELAYED RELEASE ORAL at 15:26

## 2020-10-07 RX ADMIN — DOCUSATE SODIUM 100 MG: 100 CAPSULE, LIQUID FILLED ORAL at 15:26

## 2020-10-07 RX ADMIN — OXYCODONE HYDROCHLORIDE AND ACETAMINOPHEN 1 TABLET: 5; 325 TABLET ORAL at 22:34

## 2020-10-07 RX ADMIN — ONDANSETRON 4 MG: 2 INJECTION INTRAMUSCULAR; INTRAVENOUS at 12:47

## 2020-10-07 RX ADMIN — OXYCODONE HYDROCHLORIDE AND ACETAMINOPHEN 500 MG: 500 TABLET ORAL at 15:26

## 2020-10-07 RX ADMIN — LORATADINE 10 MG: 10 TABLET ORAL at 15:26

## 2020-10-07 RX ADMIN — POTASSIUM CHLORIDE 20 MEQ: 1500 TABLET, EXTENDED RELEASE ORAL at 15:25

## 2020-10-07 RX ADMIN — CEFAZOLIN SODIUM 2000 MG: 2 SOLUTION INTRAVENOUS at 09:45

## 2020-10-07 RX ADMIN — SODIUM CHLORIDE, SODIUM LACTATE, POTASSIUM CHLORIDE, AND CALCIUM CHLORIDE: .6; .31; .03; .02 INJECTION, SOLUTION INTRAVENOUS at 10:58

## 2020-10-07 RX ADMIN — FLUTICASONE PROPIONATE 2 SPRAY: 50 SPRAY, METERED NASAL at 17:21

## 2020-10-07 RX ADMIN — PROPOFOL 80 MCG/KG/MIN: 10 INJECTION, EMULSION INTRAVENOUS at 09:52

## 2020-10-07 RX ADMIN — LACTOBACILLUS ACIDOPHILUS / LACTOBACILLUS BULGARICUS 1 PACKET: 100 MILLION CFU STRENGTH GRANULES at 17:21

## 2020-10-07 RX ADMIN — CHLORHEXIDINE GLUCONATE 0.12% ORAL RINSE 15 ML: 1.2 LIQUID ORAL at 07:52

## 2020-10-07 RX ADMIN — DOCUSATE SODIUM 100 MG: 100 CAPSULE, LIQUID FILLED ORAL at 17:21

## 2020-10-07 RX ADMIN — OXYCODONE HYDROCHLORIDE AND ACETAMINOPHEN 1 TABLET: 5; 325 TABLET ORAL at 17:30

## 2020-10-07 RX ADMIN — BUPROPION HYDROCHLORIDE 150 MG: 150 TABLET, FILM COATED, EXTENDED RELEASE ORAL at 15:25

## 2020-10-07 RX ADMIN — FOLIC ACID 1 MG: 1 TABLET ORAL at 15:25

## 2020-10-07 RX ADMIN — HYDROCHLOROTHIAZIDE 12.5 MG: 12.5 TABLET ORAL at 15:26

## 2020-10-07 RX ADMIN — ONDANSETRON 4 MG: 2 INJECTION INTRAMUSCULAR; INTRAVENOUS at 22:37

## 2020-10-07 RX ADMIN — FENTANYL CITRATE 50 MCG: 50 INJECTION INTRAMUSCULAR; INTRAVENOUS at 09:25

## 2020-10-07 RX ADMIN — SODIUM CHLORIDE, SODIUM LACTATE, POTASSIUM CHLORIDE, AND CALCIUM CHLORIDE 100 ML/HR: .6; .31; .03; .02 INJECTION, SOLUTION INTRAVENOUS at 08:08

## 2020-10-07 RX ADMIN — FENTANYL CITRATE 50 MCG: 50 INJECTION INTRAMUSCULAR; INTRAVENOUS at 12:41

## 2020-10-07 RX ADMIN — ONDANSETRON 4 MG: 2 INJECTION INTRAMUSCULAR; INTRAVENOUS at 15:16

## 2020-10-07 RX ADMIN — DEXAMETHASONE SODIUM PHOSPHATE 4 MG: 4 INJECTION, SOLUTION INTRAMUSCULAR; INTRAVENOUS at 11:02

## 2020-10-07 RX ADMIN — CEFAZOLIN SODIUM 1000 MG: 1 SOLUTION INTRAVENOUS at 17:21

## 2020-10-07 RX ADMIN — MIDAZOLAM HYDROCHLORIDE 1 MG: 1 INJECTION, SOLUTION INTRAMUSCULAR; INTRAVENOUS at 09:25

## 2020-10-07 RX ADMIN — MIDAZOLAM HYDROCHLORIDE 1 MG: 1 INJECTION, SOLUTION INTRAMUSCULAR; INTRAVENOUS at 09:26

## 2020-10-07 RX ADMIN — FENTANYL CITRATE 50 MCG: 50 INJECTION INTRAMUSCULAR; INTRAVENOUS at 10:53

## 2020-10-07 RX ADMIN — LOSARTAN POTASSIUM 100 MG: 50 TABLET, FILM COATED ORAL at 15:26

## 2020-10-07 RX ADMIN — ONDANSETRON 4 MG: 2 INJECTION INTRAMUSCULAR; INTRAVENOUS at 11:25

## 2020-10-07 RX ADMIN — ATORVASTATIN CALCIUM 10 MG: 10 TABLET, FILM COATED ORAL at 15:25

## 2020-10-07 RX ADMIN — BUPIVACAINE HYDROCHLORIDE 5 ML: 2.5 INJECTION, SOLUTION EPIDURAL; INFILTRATION; INTRACAUDAL; PERINEURAL at 09:31

## 2020-10-07 RX ADMIN — ROPIVACAINE HYDROCHLORIDE 20 ML: 2 INJECTION, SOLUTION EPIDURAL; INFILTRATION at 09:35

## 2020-10-07 RX ADMIN — OXYCODONE HYDROCHLORIDE AND ACETAMINOPHEN 500 MG: 500 TABLET ORAL at 17:21

## 2020-10-07 RX ADMIN — BUPIVACAINE HYDROCHLORIDE IN DEXTROSE 1.6 ML: 7.5 INJECTION, SOLUTION SUBARACHNOID at 09:50

## 2020-10-07 RX ADMIN — HYDROMORPHONE HYDROCHLORIDE 0.5 MG: 2 INJECTION, SOLUTION INTRAMUSCULAR; INTRAVENOUS; SUBCUTANEOUS at 15:17

## 2020-10-07 RX ADMIN — PROPOFOL 150 MG: 10 INJECTION, EMULSION INTRAVENOUS at 10:58

## 2020-10-07 RX ADMIN — FERROUS SULFATE TAB 325 MG (65 MG ELEMENTAL FE) 325 MG: 325 (65 FE) TAB at 17:21

## 2020-10-08 DIAGNOSIS — Z96.651 HX OF TOTAL KNEE REPLACEMENT, RIGHT: Primary | ICD-10-CM

## 2020-10-08 LAB
ANION GAP SERPL CALCULATED.3IONS-SCNC: 8 MMOL/L (ref 4–13)
BUN SERPL-MCNC: 16 MG/DL (ref 7–25)
CALCIUM SERPL-MCNC: 8.8 MG/DL (ref 8.6–10.5)
CHLORIDE SERPL-SCNC: 97 MMOL/L (ref 98–107)
CO2 SERPL-SCNC: 29 MMOL/L (ref 21–31)
CREAT SERPL-MCNC: 1.19 MG/DL (ref 0.6–1.2)
GFR SERPL CREATININE-BSD FRML MDRD: 45 ML/MIN/1.73SQ M
GLUCOSE SERPL-MCNC: 132 MG/DL (ref 65–99)
HCT VFR BLD AUTO: 27.9 % (ref 42–47)
HGB BLD-MCNC: 9.6 G/DL (ref 12–16)
POTASSIUM SERPL-SCNC: 4.2 MMOL/L (ref 3.5–5.5)
SODIUM SERPL-SCNC: 134 MMOL/L (ref 134–143)

## 2020-10-08 PROCEDURE — 97110 THERAPEUTIC EXERCISES: CPT

## 2020-10-08 PROCEDURE — 85014 HEMATOCRIT: CPT | Performed by: PHYSICIAN ASSISTANT

## 2020-10-08 PROCEDURE — 85018 HEMOGLOBIN: CPT | Performed by: PHYSICIAN ASSISTANT

## 2020-10-08 PROCEDURE — 97163 PT EVAL HIGH COMPLEX 45 MIN: CPT

## 2020-10-08 PROCEDURE — 97167 OT EVAL HIGH COMPLEX 60 MIN: CPT

## 2020-10-08 PROCEDURE — 80048 BASIC METABOLIC PNL TOTAL CA: CPT | Performed by: PHYSICIAN ASSISTANT

## 2020-10-08 PROCEDURE — 99024 POSTOP FOLLOW-UP VISIT: CPT | Performed by: ORTHOPAEDIC SURGERY

## 2020-10-08 RX ORDER — POLYETHYLENE GLYCOL 3350 17 G/17G
17 POWDER, FOR SOLUTION ORAL ONCE
Status: COMPLETED | OUTPATIENT
Start: 2020-10-08 | End: 2020-10-08

## 2020-10-08 RX ORDER — FONDAPARINUX SODIUM 2.5 MG/.5ML
2.5 INJECTION SUBCUTANEOUS EVERY 24 HOURS
Qty: 7 ML | Refills: 0 | Status: SHIPPED | OUTPATIENT
Start: 2020-10-08 | End: 2020-10-09

## 2020-10-08 RX ADMIN — FLUTICASONE FUROATE AND VILANTEROL TRIFENATATE 1 PUFF: 100; 25 POWDER RESPIRATORY (INHALATION) at 08:12

## 2020-10-08 RX ADMIN — HYDROMORPHONE HYDROCHLORIDE 0.5 MG: 2 INJECTION, SOLUTION INTRAMUSCULAR; INTRAVENOUS; SUBCUTANEOUS at 19:15

## 2020-10-08 RX ADMIN — HYDROMORPHONE HYDROCHLORIDE 0.5 MG: 2 INJECTION, SOLUTION INTRAMUSCULAR; INTRAVENOUS; SUBCUTANEOUS at 00:19

## 2020-10-08 RX ADMIN — CEFAZOLIN SODIUM 1000 MG: 1 SOLUTION INTRAVENOUS at 10:28

## 2020-10-08 RX ADMIN — DOCUSATE SODIUM 100 MG: 100 CAPSULE, LIQUID FILLED ORAL at 17:34

## 2020-10-08 RX ADMIN — LOSARTAN POTASSIUM 100 MG: 50 TABLET, FILM COATED ORAL at 08:17

## 2020-10-08 RX ADMIN — ACETAMINOPHEN 650 MG: 325 TABLET ORAL at 15:22

## 2020-10-08 RX ADMIN — OXYCODONE HYDROCHLORIDE AND ACETAMINOPHEN 500 MG: 500 TABLET ORAL at 08:17

## 2020-10-08 RX ADMIN — LACTOBACILLUS ACIDOPHILUS / LACTOBACILLUS BULGARICUS 1 PACKET: 100 MILLION CFU STRENGTH GRANULES at 08:12

## 2020-10-08 RX ADMIN — POTASSIUM CHLORIDE 20 MEQ: 1500 TABLET, EXTENDED RELEASE ORAL at 08:16

## 2020-10-08 RX ADMIN — ESCITALOPRAM OXALATE 20 MG: 10 TABLET ORAL at 08:15

## 2020-10-08 RX ADMIN — CEFAZOLIN SODIUM 1000 MG: 1 SOLUTION INTRAVENOUS at 01:03

## 2020-10-08 RX ADMIN — FONDAPARINUX SODIUM 2.5 MG: 2.5 INJECTION, SOLUTION SUBCUTANEOUS at 08:14

## 2020-10-08 RX ADMIN — LORATADINE 10 MG: 10 TABLET ORAL at 08:16

## 2020-10-08 RX ADMIN — OXYCODONE HYDROCHLORIDE AND ACETAMINOPHEN 500 MG: 500 TABLET ORAL at 17:34

## 2020-10-08 RX ADMIN — OXYCODONE HYDROCHLORIDE AND ACETAMINOPHEN 1 TABLET: 5; 325 TABLET ORAL at 21:39

## 2020-10-08 RX ADMIN — ATORVASTATIN CALCIUM 10 MG: 10 TABLET, FILM COATED ORAL at 08:17

## 2020-10-08 RX ADMIN — HYDROMORPHONE HYDROCHLORIDE 0.5 MG: 2 INJECTION, SOLUTION INTRAMUSCULAR; INTRAVENOUS; SUBCUTANEOUS at 12:59

## 2020-10-08 RX ADMIN — FERROUS SULFATE TAB 325 MG (65 MG ELEMENTAL FE) 325 MG: 325 (65 FE) TAB at 08:16

## 2020-10-08 RX ADMIN — ONDANSETRON 4 MG: 2 INJECTION INTRAMUSCULAR; INTRAVENOUS at 11:24

## 2020-10-08 RX ADMIN — FERROUS SULFATE TAB 325 MG (65 MG ELEMENTAL FE) 325 MG: 325 (65 FE) TAB at 15:35

## 2020-10-08 RX ADMIN — Medication 1 TABLET: at 08:16

## 2020-10-08 RX ADMIN — FLUTICASONE PROPIONATE 2 SPRAY: 50 SPRAY, METERED NASAL at 08:11

## 2020-10-08 RX ADMIN — HYDROCHLOROTHIAZIDE 12.5 MG: 12.5 TABLET ORAL at 08:13

## 2020-10-08 RX ADMIN — ALUMINUM HYDROXIDE, MAGNESIUM HYDROXIDE, AND SIMETHICONE 30 ML: 200; 200; 20 SUSPENSION ORAL at 12:39

## 2020-10-08 RX ADMIN — BUPROPION HYDROCHLORIDE 150 MG: 150 TABLET, FILM COATED, EXTENDED RELEASE ORAL at 08:17

## 2020-10-08 RX ADMIN — FOLIC ACID 1 MG: 1 TABLET ORAL at 08:17

## 2020-10-08 RX ADMIN — HYDROMORPHONE HYDROCHLORIDE 0.5 MG: 2 INJECTION, SOLUTION INTRAMUSCULAR; INTRAVENOUS; SUBCUTANEOUS at 05:10

## 2020-10-08 RX ADMIN — LACTOBACILLUS ACIDOPHILUS / LACTOBACILLUS BULGARICUS 1 PACKET: 100 MILLION CFU STRENGTH GRANULES at 15:35

## 2020-10-08 RX ADMIN — POLYETHYLENE GLYCOL 3350 17 G: 17 POWDER, FOR SOLUTION ORAL at 14:31

## 2020-10-08 RX ADMIN — OXYCODONE HYDROCHLORIDE AND ACETAMINOPHEN 1 TABLET: 5; 325 TABLET ORAL at 09:23

## 2020-10-08 RX ADMIN — DOCUSATE SODIUM 100 MG: 100 CAPSULE, LIQUID FILLED ORAL at 08:16

## 2020-10-08 RX ADMIN — PANTOPRAZOLE SODIUM 40 MG: 20 TABLET, DELAYED RELEASE ORAL at 06:06

## 2020-10-09 ENCOUNTER — TELEPHONE (OUTPATIENT)
Dept: OBGYN CLINIC | Facility: HOSPITAL | Age: 75
End: 2020-10-09

## 2020-10-09 ENCOUNTER — APPOINTMENT (OUTPATIENT)
Dept: PHYSICAL THERAPY | Facility: CLINIC | Age: 75
End: 2020-10-09
Payer: MEDICARE

## 2020-10-09 VITALS
HEART RATE: 75 BPM | TEMPERATURE: 98 F | RESPIRATION RATE: 18 BRPM | HEIGHT: 66 IN | WEIGHT: 179 LBS | OXYGEN SATURATION: 98 % | BODY MASS INDEX: 28.77 KG/M2 | SYSTOLIC BLOOD PRESSURE: 147 MMHG | DIASTOLIC BLOOD PRESSURE: 65 MMHG

## 2020-10-09 DIAGNOSIS — M17.11 PRIMARY OSTEOARTHRITIS OF RIGHT KNEE: Primary | ICD-10-CM

## 2020-10-09 LAB
ANION GAP SERPL CALCULATED.3IONS-SCNC: 6 MMOL/L (ref 4–13)
BUN SERPL-MCNC: 21 MG/DL (ref 7–25)
CALCIUM SERPL-MCNC: 9.2 MG/DL (ref 8.6–10.5)
CHLORIDE SERPL-SCNC: 96 MMOL/L (ref 98–107)
CO2 SERPL-SCNC: 32 MMOL/L (ref 21–31)
CREAT SERPL-MCNC: 1.26 MG/DL (ref 0.6–1.2)
GFR SERPL CREATININE-BSD FRML MDRD: 42 ML/MIN/1.73SQ M
GLUCOSE SERPL-MCNC: 112 MG/DL (ref 65–99)
HCT VFR BLD AUTO: 24.7 % (ref 42–47)
HGB BLD-MCNC: 8.6 G/DL (ref 12–16)
POTASSIUM SERPL-SCNC: 4 MMOL/L (ref 3.5–5.5)
SODIUM SERPL-SCNC: 134 MMOL/L (ref 134–143)

## 2020-10-09 PROCEDURE — 97530 THERAPEUTIC ACTIVITIES: CPT

## 2020-10-09 PROCEDURE — 80048 BASIC METABOLIC PNL TOTAL CA: CPT | Performed by: PHYSICIAN ASSISTANT

## 2020-10-09 PROCEDURE — 85018 HEMOGLOBIN: CPT | Performed by: PHYSICIAN ASSISTANT

## 2020-10-09 PROCEDURE — 99024 POSTOP FOLLOW-UP VISIT: CPT | Performed by: ORTHOPAEDIC SURGERY

## 2020-10-09 PROCEDURE — 97535 SELF CARE MNGMENT TRAINING: CPT

## 2020-10-09 PROCEDURE — 97116 GAIT TRAINING THERAPY: CPT

## 2020-10-09 PROCEDURE — 85014 HEMATOCRIT: CPT | Performed by: PHYSICIAN ASSISTANT

## 2020-10-09 PROCEDURE — 97110 THERAPEUTIC EXERCISES: CPT

## 2020-10-09 RX ORDER — OXYCODONE HYDROCHLORIDE AND ACETAMINOPHEN 5; 325 MG/1; MG/1
1 TABLET ORAL EVERY 4 HOURS PRN
Qty: 50 TABLET | Refills: 0 | Status: SHIPPED | OUTPATIENT
Start: 2020-10-09 | End: 2020-10-19 | Stop reason: SINTOL

## 2020-10-09 RX ORDER — DOCUSATE SODIUM 100 MG/1
100 CAPSULE, LIQUID FILLED ORAL 2 TIMES DAILY
Qty: 10 CAPSULE | Refills: 0 | Status: SHIPPED | OUTPATIENT
Start: 2020-10-09 | End: 2021-12-03

## 2020-10-09 RX ORDER — CEPHALEXIN 500 MG/1
500 CAPSULE ORAL EVERY 8 HOURS SCHEDULED
Qty: 15 CAPSULE | Refills: 0 | Status: SHIPPED | OUTPATIENT
Start: 2020-10-09 | End: 2020-10-14

## 2020-10-09 RX ORDER — ACETAMINOPHEN 325 MG/1
650 TABLET ORAL EVERY 4 HOURS PRN
Refills: 0
Start: 2020-10-09

## 2020-10-09 RX ADMIN — FLUTICASONE PROPIONATE 2 SPRAY: 50 SPRAY, METERED NASAL at 08:28

## 2020-10-09 RX ADMIN — HYDROCHLOROTHIAZIDE 12.5 MG: 12.5 TABLET ORAL at 08:27

## 2020-10-09 RX ADMIN — FERROUS SULFATE TAB 325 MG (65 MG ELEMENTAL FE) 325 MG: 325 (65 FE) TAB at 08:27

## 2020-10-09 RX ADMIN — POTASSIUM CHLORIDE 20 MEQ: 1500 TABLET, EXTENDED RELEASE ORAL at 08:28

## 2020-10-09 RX ADMIN — OXYCODONE HYDROCHLORIDE AND ACETAMINOPHEN 500 MG: 500 TABLET ORAL at 08:27

## 2020-10-09 RX ADMIN — PANTOPRAZOLE SODIUM 40 MG: 20 TABLET, DELAYED RELEASE ORAL at 05:01

## 2020-10-09 RX ADMIN — Medication 1 TABLET: at 08:27

## 2020-10-09 RX ADMIN — OXYCODONE HYDROCHLORIDE AND ACETAMINOPHEN 1 TABLET: 5; 325 TABLET ORAL at 11:08

## 2020-10-09 RX ADMIN — OXYCODONE HYDROCHLORIDE AND ACETAMINOPHEN 1 TABLET: 5; 325 TABLET ORAL at 07:04

## 2020-10-09 RX ADMIN — ESCITALOPRAM OXALATE 20 MG: 10 TABLET ORAL at 08:27

## 2020-10-09 RX ADMIN — LORATADINE 10 MG: 10 TABLET ORAL at 08:27

## 2020-10-09 RX ADMIN — DOCUSATE SODIUM 100 MG: 100 CAPSULE, LIQUID FILLED ORAL at 08:27

## 2020-10-09 RX ADMIN — ATORVASTATIN CALCIUM 10 MG: 10 TABLET, FILM COATED ORAL at 08:27

## 2020-10-09 RX ADMIN — FONDAPARINUX SODIUM 2.5 MG: 2.5 INJECTION, SOLUTION SUBCUTANEOUS at 08:28

## 2020-10-09 RX ADMIN — LACTOBACILLUS ACIDOPHILUS / LACTOBACILLUS BULGARICUS 1 PACKET: 100 MILLION CFU STRENGTH GRANULES at 11:41

## 2020-10-09 RX ADMIN — BUPROPION HYDROCHLORIDE 150 MG: 150 TABLET, FILM COATED, EXTENDED RELEASE ORAL at 08:27

## 2020-10-09 RX ADMIN — FOLIC ACID 1 MG: 1 TABLET ORAL at 08:27

## 2020-10-09 RX ADMIN — LOSARTAN POTASSIUM 100 MG: 50 TABLET, FILM COATED ORAL at 08:27

## 2020-10-09 RX ADMIN — ONDANSETRON 4 MG: 2 INJECTION INTRAMUSCULAR; INTRAVENOUS at 05:23

## 2020-10-09 RX ADMIN — HYDROMORPHONE HYDROCHLORIDE 0.5 MG: 2 INJECTION, SOLUTION INTRAMUSCULAR; INTRAVENOUS; SUBCUTANEOUS at 02:44

## 2020-10-09 RX ADMIN — FLUTICASONE FUROATE AND VILANTEROL TRIFENATATE 1 PUFF: 100; 25 POWDER RESPIRATORY (INHALATION) at 08:28

## 2020-10-09 RX ADMIN — LACTOBACILLUS ACIDOPHILUS / LACTOBACILLUS BULGARICUS 1 PACKET: 100 MILLION CFU STRENGTH GRANULES at 08:28

## 2020-10-12 ENCOUNTER — TELEPHONE (OUTPATIENT)
Dept: OBGYN CLINIC | Facility: HOSPITAL | Age: 75
End: 2020-10-12

## 2020-10-12 ENCOUNTER — TRANSITIONAL CARE MANAGEMENT (OUTPATIENT)
Dept: FAMILY MEDICINE CLINIC | Facility: CLINIC | Age: 75
End: 2020-10-12

## 2020-10-13 ENCOUNTER — TELEPHONE (OUTPATIENT)
Dept: OBGYN CLINIC | Facility: HOSPITAL | Age: 75
End: 2020-10-13

## 2020-10-15 ENCOUNTER — TELEPHONE (OUTPATIENT)
Dept: OBGYN CLINIC | Facility: HOSPITAL | Age: 75
End: 2020-10-15

## 2020-10-19 ENCOUNTER — OFFICE VISIT (OUTPATIENT)
Dept: OBGYN CLINIC | Facility: CLINIC | Age: 75
End: 2020-10-19

## 2020-10-19 VITALS
DIASTOLIC BLOOD PRESSURE: 60 MMHG | SYSTOLIC BLOOD PRESSURE: 122 MMHG | BODY MASS INDEX: 28.77 KG/M2 | HEIGHT: 66 IN | WEIGHT: 179 LBS | TEMPERATURE: 98.2 F

## 2020-10-19 DIAGNOSIS — Z96.651 HX OF TOTAL KNEE REPLACEMENT, RIGHT: Primary | ICD-10-CM

## 2020-10-19 PROCEDURE — 99024 POSTOP FOLLOW-UP VISIT: CPT | Performed by: ORTHOPAEDIC SURGERY

## 2020-10-19 RX ORDER — TRAMADOL HYDROCHLORIDE 50 MG/1
50 TABLET ORAL EVERY 6 HOURS PRN
Qty: 42 TABLET | Refills: 0 | Status: SHIPPED | OUTPATIENT
Start: 2020-10-19 | End: 2020-11-12 | Stop reason: SDUPTHER

## 2020-10-20 ENCOUNTER — OFFICE VISIT (OUTPATIENT)
Dept: FAMILY MEDICINE CLINIC | Facility: CLINIC | Age: 75
End: 2020-10-20
Payer: MEDICARE

## 2020-10-20 VITALS
OXYGEN SATURATION: 99 % | BODY MASS INDEX: 27.64 KG/M2 | HEIGHT: 66 IN | TEMPERATURE: 98 F | DIASTOLIC BLOOD PRESSURE: 68 MMHG | SYSTOLIC BLOOD PRESSURE: 128 MMHG | WEIGHT: 172 LBS | HEART RATE: 86 BPM | RESPIRATION RATE: 18 BRPM

## 2020-10-20 DIAGNOSIS — D64.9 POSTOPERATIVE ANEMIA: ICD-10-CM

## 2020-10-20 DIAGNOSIS — Z09 HOSPITAL DISCHARGE FOLLOW-UP: Primary | ICD-10-CM

## 2020-10-20 DIAGNOSIS — Z13.29 THYROID DISORDER SCREEN: ICD-10-CM

## 2020-10-20 DIAGNOSIS — R11.0 NAUSEA: ICD-10-CM

## 2020-10-20 DIAGNOSIS — N17.9 AKI (ACUTE KIDNEY INJURY) (HCC): ICD-10-CM

## 2020-10-20 DIAGNOSIS — K30 BURNING SENSATION OF STOMACH: ICD-10-CM

## 2020-10-20 DIAGNOSIS — Z96.651 HX OF TOTAL KNEE REPLACEMENT, RIGHT: ICD-10-CM

## 2020-10-20 PROCEDURE — 99495 TRANSJ CARE MGMT MOD F2F 14D: CPT | Performed by: PHYSICIAN ASSISTANT

## 2020-10-20 RX ORDER — ONDANSETRON 4 MG/1
4 TABLET, FILM COATED ORAL EVERY 8 HOURS PRN
Qty: 20 TABLET | Refills: 0 | Status: SHIPPED | OUTPATIENT
Start: 2020-10-20 | End: 2021-05-20

## 2020-10-23 ENCOUNTER — OFFICE VISIT (OUTPATIENT)
Dept: PHYSICAL THERAPY | Facility: CLINIC | Age: 75
End: 2020-10-23
Payer: MEDICARE

## 2020-10-23 DIAGNOSIS — Z96.651 HX OF TOTAL KNEE REPLACEMENT, RIGHT: ICD-10-CM

## 2020-10-23 DIAGNOSIS — M17.11 PRIMARY OSTEOARTHRITIS OF RIGHT KNEE: Primary | ICD-10-CM

## 2020-10-23 PROCEDURE — 97112 NEUROMUSCULAR REEDUCATION: CPT | Performed by: PHYSICAL THERAPIST

## 2020-10-23 PROCEDURE — 97110 THERAPEUTIC EXERCISES: CPT | Performed by: PHYSICAL THERAPIST

## 2020-10-26 ENCOUNTER — OFFICE VISIT (OUTPATIENT)
Dept: PHYSICAL THERAPY | Facility: CLINIC | Age: 75
End: 2020-10-26
Payer: MEDICARE

## 2020-10-26 DIAGNOSIS — Z96.651 HX OF TOTAL KNEE REPLACEMENT, RIGHT: ICD-10-CM

## 2020-10-26 DIAGNOSIS — M17.11 PRIMARY OSTEOARTHRITIS OF RIGHT KNEE: Primary | ICD-10-CM

## 2020-10-26 PROCEDURE — 97110 THERAPEUTIC EXERCISES: CPT

## 2020-10-26 PROCEDURE — 97112 NEUROMUSCULAR REEDUCATION: CPT

## 2020-10-29 ENCOUNTER — TELEPHONE (OUTPATIENT)
Dept: FAMILY MEDICINE CLINIC | Facility: CLINIC | Age: 75
End: 2020-10-29

## 2020-10-29 ENCOUNTER — OFFICE VISIT (OUTPATIENT)
Dept: PHYSICAL THERAPY | Facility: CLINIC | Age: 75
End: 2020-10-29
Payer: MEDICARE

## 2020-10-29 DIAGNOSIS — K30 BURNING SENSATION OF STOMACH: Primary | ICD-10-CM

## 2020-10-29 DIAGNOSIS — M17.11 PRIMARY OSTEOARTHRITIS OF RIGHT KNEE: Primary | ICD-10-CM

## 2020-10-29 DIAGNOSIS — Z96.651 HX OF TOTAL KNEE REPLACEMENT, RIGHT: ICD-10-CM

## 2020-10-29 DIAGNOSIS — R10.13 EPIGASTRIC PAIN: ICD-10-CM

## 2020-10-29 DIAGNOSIS — R11.0 NAUSEA: ICD-10-CM

## 2020-10-29 PROCEDURE — 97140 MANUAL THERAPY 1/> REGIONS: CPT | Performed by: PHYSICAL THERAPIST

## 2020-10-29 PROCEDURE — 97112 NEUROMUSCULAR REEDUCATION: CPT | Performed by: PHYSICAL THERAPIST

## 2020-10-29 PROCEDURE — 97110 THERAPEUTIC EXERCISES: CPT | Performed by: PHYSICAL THERAPIST

## 2020-10-29 RX ORDER — SUCRALFATE ORAL 1 G/10ML
1 SUSPENSION ORAL
Qty: 420 ML | Refills: 0 | Status: SHIPPED | OUTPATIENT
Start: 2020-10-29 | End: 2020-11-02

## 2020-11-02 ENCOUNTER — TELEPHONE (OUTPATIENT)
Dept: FAMILY MEDICINE CLINIC | Facility: CLINIC | Age: 75
End: 2020-11-02

## 2020-11-02 DIAGNOSIS — K30 BURNING SENSATION OF STOMACH: Primary | ICD-10-CM

## 2020-11-02 DIAGNOSIS — R11.0 NAUSEA: ICD-10-CM

## 2020-11-02 DIAGNOSIS — R10.13 EPIGASTRIC PAIN: ICD-10-CM

## 2020-11-02 RX ORDER — SUCRALFATE 1 G/1
1 TABLET ORAL 4 TIMES DAILY
Qty: 120 TABLET | Refills: 0 | Status: SHIPPED | OUTPATIENT
Start: 2020-11-02 | End: 2021-05-20

## 2020-11-03 ENCOUNTER — OFFICE VISIT (OUTPATIENT)
Dept: PHYSICAL THERAPY | Facility: CLINIC | Age: 75
End: 2020-11-03
Payer: MEDICARE

## 2020-11-03 DIAGNOSIS — Z96.651 HX OF TOTAL KNEE REPLACEMENT, RIGHT: ICD-10-CM

## 2020-11-03 DIAGNOSIS — M17.11 PRIMARY OSTEOARTHRITIS OF RIGHT KNEE: Primary | ICD-10-CM

## 2020-11-03 PROCEDURE — 97116 GAIT TRAINING THERAPY: CPT

## 2020-11-03 PROCEDURE — 97530 THERAPEUTIC ACTIVITIES: CPT

## 2020-11-03 PROCEDURE — 97112 NEUROMUSCULAR REEDUCATION: CPT

## 2020-11-03 PROCEDURE — 97110 THERAPEUTIC EXERCISES: CPT

## 2020-11-05 ENCOUNTER — OFFICE VISIT (OUTPATIENT)
Dept: PHYSICAL THERAPY | Facility: CLINIC | Age: 75
End: 2020-11-05
Payer: MEDICARE

## 2020-11-05 DIAGNOSIS — M17.11 PRIMARY OSTEOARTHRITIS OF RIGHT KNEE: Primary | ICD-10-CM

## 2020-11-05 DIAGNOSIS — Z96.651 HX OF TOTAL KNEE REPLACEMENT, RIGHT: ICD-10-CM

## 2020-11-05 PROCEDURE — 97116 GAIT TRAINING THERAPY: CPT | Performed by: PHYSICAL THERAPIST

## 2020-11-05 PROCEDURE — 97140 MANUAL THERAPY 1/> REGIONS: CPT | Performed by: PHYSICAL THERAPIST

## 2020-11-05 PROCEDURE — 97110 THERAPEUTIC EXERCISES: CPT | Performed by: PHYSICAL THERAPIST

## 2020-11-09 ENCOUNTER — LAB (OUTPATIENT)
Dept: LAB | Facility: CLINIC | Age: 75
End: 2020-11-09
Payer: MEDICARE

## 2020-11-09 DIAGNOSIS — N17.9 AKI (ACUTE KIDNEY INJURY) (HCC): ICD-10-CM

## 2020-11-09 DIAGNOSIS — Z13.29 THYROID DISORDER SCREEN: ICD-10-CM

## 2020-11-09 DIAGNOSIS — D64.9 POSTOPERATIVE ANEMIA: ICD-10-CM

## 2020-11-09 DIAGNOSIS — Z96.651 HX OF TOTAL KNEE REPLACEMENT, RIGHT: ICD-10-CM

## 2020-11-09 LAB
ALBUMIN SERPL BCP-MCNC: 3.6 G/DL (ref 3.5–5)
ALP SERPL-CCNC: 99 U/L (ref 46–116)
ALT SERPL W P-5'-P-CCNC: 22 U/L (ref 12–78)
ANION GAP SERPL CALCULATED.3IONS-SCNC: 2 MMOL/L (ref 4–13)
AST SERPL W P-5'-P-CCNC: 18 U/L (ref 5–45)
BASOPHILS # BLD AUTO: 0.02 THOUSANDS/ΜL (ref 0–0.1)
BASOPHILS NFR BLD AUTO: 0 % (ref 0–1)
BILIRUB SERPL-MCNC: 0.51 MG/DL (ref 0.2–1)
BUN SERPL-MCNC: 14 MG/DL (ref 5–25)
CALCIUM SERPL-MCNC: 9.6 MG/DL (ref 8.3–10.1)
CHLORIDE SERPL-SCNC: 104 MMOL/L (ref 100–108)
CO2 SERPL-SCNC: 32 MMOL/L (ref 21–32)
CREAT SERPL-MCNC: 0.99 MG/DL (ref 0.6–1.3)
EOSINOPHIL # BLD AUTO: 0.16 THOUSAND/ΜL (ref 0–0.61)
EOSINOPHIL NFR BLD AUTO: 3 % (ref 0–6)
ERYTHROCYTE [DISTWIDTH] IN BLOOD BY AUTOMATED COUNT: 12.8 % (ref 11.6–15.1)
GFR SERPL CREATININE-BSD FRML MDRD: 57 ML/MIN/1.73SQ M
GLUCOSE P FAST SERPL-MCNC: 88 MG/DL (ref 65–99)
HCT VFR BLD AUTO: 30 % (ref 34.8–46.1)
HGB BLD-MCNC: 9.3 G/DL (ref 11.5–15.4)
IMM GRANULOCYTES # BLD AUTO: 0.02 THOUSAND/UL (ref 0–0.2)
IMM GRANULOCYTES NFR BLD AUTO: 0 % (ref 0–2)
LYMPHOCYTES # BLD AUTO: 1.16 THOUSANDS/ΜL (ref 0.6–4.47)
LYMPHOCYTES NFR BLD AUTO: 19 % (ref 14–44)
MCH RBC QN AUTO: 29.3 PG (ref 26.8–34.3)
MCHC RBC AUTO-ENTMCNC: 31 G/DL (ref 31.4–37.4)
MCV RBC AUTO: 95 FL (ref 82–98)
MONOCYTES # BLD AUTO: 0.54 THOUSAND/ΜL (ref 0.17–1.22)
MONOCYTES NFR BLD AUTO: 9 % (ref 4–12)
NEUTROPHILS # BLD AUTO: 4.07 THOUSANDS/ΜL (ref 1.85–7.62)
NEUTS SEG NFR BLD AUTO: 69 % (ref 43–75)
NRBC BLD AUTO-RTO: 0 /100 WBCS
PLATELET # BLD AUTO: 235 THOUSANDS/UL (ref 149–390)
PMV BLD AUTO: 12 FL (ref 8.9–12.7)
POTASSIUM SERPL-SCNC: 3.4 MMOL/L (ref 3.5–5.3)
PROT SERPL-MCNC: 6.7 G/DL (ref 6.4–8.2)
RBC # BLD AUTO: 3.17 MILLION/UL (ref 3.81–5.12)
SODIUM SERPL-SCNC: 138 MMOL/L (ref 136–145)
TSH SERPL DL<=0.05 MIU/L-ACNC: 1.76 UIU/ML (ref 0.36–3.74)
WBC # BLD AUTO: 5.97 THOUSAND/UL (ref 4.31–10.16)

## 2020-11-09 PROCEDURE — 84443 ASSAY THYROID STIM HORMONE: CPT

## 2020-11-09 PROCEDURE — 36415 COLL VENOUS BLD VENIPUNCTURE: CPT

## 2020-11-09 PROCEDURE — 85025 COMPLETE CBC W/AUTO DIFF WBC: CPT

## 2020-11-09 PROCEDURE — 80053 COMPREHEN METABOLIC PANEL: CPT

## 2020-11-10 ENCOUNTER — OFFICE VISIT (OUTPATIENT)
Dept: PHYSICAL THERAPY | Facility: CLINIC | Age: 75
End: 2020-11-10
Payer: MEDICARE

## 2020-11-10 DIAGNOSIS — M17.11 PRIMARY OSTEOARTHRITIS OF RIGHT KNEE: Primary | ICD-10-CM

## 2020-11-10 DIAGNOSIS — Z96.651 HX OF TOTAL KNEE REPLACEMENT, RIGHT: ICD-10-CM

## 2020-11-10 PROCEDURE — 97110 THERAPEUTIC EXERCISES: CPT | Performed by: PHYSICAL THERAPIST

## 2020-11-10 PROCEDURE — 97116 GAIT TRAINING THERAPY: CPT | Performed by: PHYSICAL THERAPIST

## 2020-11-10 PROCEDURE — 97140 MANUAL THERAPY 1/> REGIONS: CPT | Performed by: PHYSICAL THERAPIST

## 2020-11-12 ENCOUNTER — OFFICE VISIT (OUTPATIENT)
Dept: FAMILY MEDICINE CLINIC | Facility: CLINIC | Age: 75
End: 2020-11-12
Payer: MEDICARE

## 2020-11-12 VITALS
SYSTOLIC BLOOD PRESSURE: 128 MMHG | WEIGHT: 168 LBS | HEART RATE: 66 BPM | RESPIRATION RATE: 18 BRPM | HEIGHT: 66 IN | TEMPERATURE: 97.9 F | DIASTOLIC BLOOD PRESSURE: 88 MMHG | OXYGEN SATURATION: 95 % | BODY MASS INDEX: 27 KG/M2

## 2020-11-12 DIAGNOSIS — D64.9 POSTOPERATIVE ANEMIA: ICD-10-CM

## 2020-11-12 DIAGNOSIS — Z01.812 PRE-OPERATIVE LABORATORY EXAMINATION: ICD-10-CM

## 2020-11-12 DIAGNOSIS — F41.8 DEPRESSION WITH ANXIETY: Chronic | ICD-10-CM

## 2020-11-12 DIAGNOSIS — E87.6 HYPOKALEMIA: ICD-10-CM

## 2020-11-12 DIAGNOSIS — E78.2 MIXED HYPERLIPIDEMIA: ICD-10-CM

## 2020-11-12 DIAGNOSIS — Z96.651 HX OF TOTAL KNEE REPLACEMENT, RIGHT: ICD-10-CM

## 2020-11-12 DIAGNOSIS — Z23 PNEUMOCOCCAL VACCINE ADMINISTERED: ICD-10-CM

## 2020-11-12 DIAGNOSIS — I10 BENIGN ESSENTIAL HYPERTENSION: Chronic | ICD-10-CM

## 2020-11-12 DIAGNOSIS — Z00.00 MEDICARE ANNUAL WELLNESS VISIT, SUBSEQUENT: Primary | ICD-10-CM

## 2020-11-12 PROBLEM — M25.559 GREATER TROCHANTERIC PAIN SYNDROME: Status: ACTIVE | Noted: 2018-05-15

## 2020-11-12 PROCEDURE — G0009 ADMIN PNEUMOCOCCAL VACCINE: HCPCS

## 2020-11-12 PROCEDURE — 99215 OFFICE O/P EST HI 40 MIN: CPT | Performed by: PHYSICIAN ASSISTANT

## 2020-11-12 PROCEDURE — 90732 PPSV23 VACC 2 YRS+ SUBQ/IM: CPT

## 2020-11-12 PROCEDURE — 1123F ACP DISCUSS/DSCN MKR DOCD: CPT | Performed by: PHYSICIAN ASSISTANT

## 2020-11-12 PROCEDURE — G0439 PPPS, SUBSEQ VISIT: HCPCS | Performed by: PHYSICIAN ASSISTANT

## 2020-11-12 RX ORDER — ASPIRIN 81 MG/1
81 TABLET ORAL DAILY
COMMUNITY
End: 2021-05-20

## 2020-11-12 RX ORDER — TRAMADOL HYDROCHLORIDE 50 MG/1
50 TABLET ORAL EVERY 6 HOURS PRN
Qty: 42 TABLET | Refills: 0 | Status: SHIPPED | OUTPATIENT
Start: 2020-11-12 | End: 2021-05-20

## 2020-11-13 ENCOUNTER — OFFICE VISIT (OUTPATIENT)
Dept: PHYSICAL THERAPY | Facility: CLINIC | Age: 75
End: 2020-11-13
Payer: MEDICARE

## 2020-11-13 DIAGNOSIS — Z96.651 HX OF TOTAL KNEE REPLACEMENT, RIGHT: ICD-10-CM

## 2020-11-13 DIAGNOSIS — M17.11 PRIMARY OSTEOARTHRITIS OF RIGHT KNEE: Primary | ICD-10-CM

## 2020-11-13 PROCEDURE — 97530 THERAPEUTIC ACTIVITIES: CPT

## 2020-11-13 PROCEDURE — 97110 THERAPEUTIC EXERCISES: CPT

## 2020-11-13 PROCEDURE — 97112 NEUROMUSCULAR REEDUCATION: CPT

## 2020-11-13 PROCEDURE — 97140 MANUAL THERAPY 1/> REGIONS: CPT

## 2020-11-16 ENCOUNTER — OFFICE VISIT (OUTPATIENT)
Dept: OBGYN CLINIC | Facility: CLINIC | Age: 75
End: 2020-11-16

## 2020-11-16 VITALS
HEIGHT: 66 IN | DIASTOLIC BLOOD PRESSURE: 79 MMHG | SYSTOLIC BLOOD PRESSURE: 147 MMHG | BODY MASS INDEX: 27 KG/M2 | WEIGHT: 168 LBS | TEMPERATURE: 97.4 F | HEART RATE: 76 BPM

## 2020-11-16 DIAGNOSIS — Z96.651 HX OF TOTAL KNEE REPLACEMENT, RIGHT: Primary | ICD-10-CM

## 2020-11-16 PROCEDURE — 99024 POSTOP FOLLOW-UP VISIT: CPT | Performed by: ORTHOPAEDIC SURGERY

## 2020-11-17 ENCOUNTER — OFFICE VISIT (OUTPATIENT)
Dept: PHYSICAL THERAPY | Facility: CLINIC | Age: 75
End: 2020-11-17
Payer: MEDICARE

## 2020-11-17 DIAGNOSIS — M17.11 PRIMARY OSTEOARTHRITIS OF RIGHT KNEE: Primary | ICD-10-CM

## 2020-11-17 DIAGNOSIS — Z96.651 HX OF TOTAL KNEE REPLACEMENT, RIGHT: ICD-10-CM

## 2020-11-17 PROCEDURE — 97110 THERAPEUTIC EXERCISES: CPT

## 2020-11-17 PROCEDURE — 97140 MANUAL THERAPY 1/> REGIONS: CPT

## 2020-11-17 PROCEDURE — 97112 NEUROMUSCULAR REEDUCATION: CPT

## 2020-11-19 ENCOUNTER — EVALUATION (OUTPATIENT)
Dept: PHYSICAL THERAPY | Facility: CLINIC | Age: 75
End: 2020-11-19
Payer: MEDICARE

## 2020-11-19 DIAGNOSIS — Z96.651 HX OF TOTAL KNEE REPLACEMENT, RIGHT: ICD-10-CM

## 2020-11-19 DIAGNOSIS — M17.11 PRIMARY OSTEOARTHRITIS OF RIGHT KNEE: Primary | ICD-10-CM

## 2020-11-19 PROCEDURE — 97112 NEUROMUSCULAR REEDUCATION: CPT | Performed by: PHYSICAL THERAPIST

## 2020-11-19 PROCEDURE — 97140 MANUAL THERAPY 1/> REGIONS: CPT | Performed by: PHYSICAL THERAPIST

## 2020-11-19 PROCEDURE — 97116 GAIT TRAINING THERAPY: CPT | Performed by: PHYSICAL THERAPIST

## 2020-11-19 PROCEDURE — 97110 THERAPEUTIC EXERCISES: CPT | Performed by: PHYSICAL THERAPIST

## 2020-11-25 ENCOUNTER — OFFICE VISIT (OUTPATIENT)
Dept: PHYSICAL THERAPY | Facility: CLINIC | Age: 75
End: 2020-11-25
Payer: MEDICARE

## 2020-11-25 DIAGNOSIS — Z96.651 HX OF TOTAL KNEE REPLACEMENT, RIGHT: ICD-10-CM

## 2020-11-25 DIAGNOSIS — M17.11 PRIMARY OSTEOARTHRITIS OF RIGHT KNEE: Primary | ICD-10-CM

## 2020-11-25 PROCEDURE — 97140 MANUAL THERAPY 1/> REGIONS: CPT | Performed by: PHYSICAL THERAPIST

## 2020-11-25 PROCEDURE — 97110 THERAPEUTIC EXERCISES: CPT | Performed by: PHYSICAL THERAPIST

## 2020-11-25 PROCEDURE — 97116 GAIT TRAINING THERAPY: CPT | Performed by: PHYSICAL THERAPIST

## 2020-11-27 ENCOUNTER — OFFICE VISIT (OUTPATIENT)
Dept: PHYSICAL THERAPY | Facility: CLINIC | Age: 75
End: 2020-11-27
Payer: MEDICARE

## 2020-11-27 DIAGNOSIS — M17.11 PRIMARY OSTEOARTHRITIS OF RIGHT KNEE: Primary | ICD-10-CM

## 2020-11-27 DIAGNOSIS — Z96.651 HX OF TOTAL KNEE REPLACEMENT, RIGHT: ICD-10-CM

## 2020-11-27 PROCEDURE — 97140 MANUAL THERAPY 1/> REGIONS: CPT

## 2020-11-27 PROCEDURE — 97110 THERAPEUTIC EXERCISES: CPT

## 2020-11-27 PROCEDURE — 97112 NEUROMUSCULAR REEDUCATION: CPT

## 2020-12-01 ENCOUNTER — OFFICE VISIT (OUTPATIENT)
Dept: PHYSICAL THERAPY | Facility: CLINIC | Age: 75
End: 2020-12-01
Payer: MEDICARE

## 2020-12-01 DIAGNOSIS — Z96.651 HX OF TOTAL KNEE REPLACEMENT, RIGHT: ICD-10-CM

## 2020-12-01 DIAGNOSIS — M17.11 PRIMARY OSTEOARTHRITIS OF RIGHT KNEE: Primary | ICD-10-CM

## 2020-12-01 PROCEDURE — 97112 NEUROMUSCULAR REEDUCATION: CPT

## 2020-12-01 PROCEDURE — 97140 MANUAL THERAPY 1/> REGIONS: CPT

## 2020-12-01 PROCEDURE — 97110 THERAPEUTIC EXERCISES: CPT

## 2020-12-03 ENCOUNTER — OFFICE VISIT (OUTPATIENT)
Dept: PHYSICAL THERAPY | Facility: CLINIC | Age: 75
End: 2020-12-03
Payer: MEDICARE

## 2020-12-03 DIAGNOSIS — Z96.651 HX OF TOTAL KNEE REPLACEMENT, RIGHT: ICD-10-CM

## 2020-12-03 DIAGNOSIS — M17.11 PRIMARY OSTEOARTHRITIS OF RIGHT KNEE: Primary | ICD-10-CM

## 2020-12-03 PROCEDURE — 97110 THERAPEUTIC EXERCISES: CPT | Performed by: PHYSICAL THERAPIST

## 2020-12-03 PROCEDURE — 97140 MANUAL THERAPY 1/> REGIONS: CPT | Performed by: PHYSICAL THERAPIST

## 2020-12-03 PROCEDURE — 97112 NEUROMUSCULAR REEDUCATION: CPT | Performed by: PHYSICAL THERAPIST

## 2020-12-08 ENCOUNTER — OFFICE VISIT (OUTPATIENT)
Dept: PHYSICAL THERAPY | Facility: CLINIC | Age: 75
End: 2020-12-08
Payer: MEDICARE

## 2020-12-08 DIAGNOSIS — Z96.651 HX OF TOTAL KNEE REPLACEMENT, RIGHT: ICD-10-CM

## 2020-12-08 DIAGNOSIS — M17.11 PRIMARY OSTEOARTHRITIS OF RIGHT KNEE: Primary | ICD-10-CM

## 2020-12-08 PROCEDURE — 97530 THERAPEUTIC ACTIVITIES: CPT

## 2020-12-08 PROCEDURE — 97110 THERAPEUTIC EXERCISES: CPT

## 2020-12-08 PROCEDURE — 97140 MANUAL THERAPY 1/> REGIONS: CPT

## 2020-12-08 PROCEDURE — 97112 NEUROMUSCULAR REEDUCATION: CPT

## 2020-12-10 ENCOUNTER — OFFICE VISIT (OUTPATIENT)
Dept: PHYSICAL THERAPY | Facility: CLINIC | Age: 75
End: 2020-12-10
Payer: MEDICARE

## 2020-12-10 DIAGNOSIS — Z96.651 HX OF TOTAL KNEE REPLACEMENT, RIGHT: ICD-10-CM

## 2020-12-10 DIAGNOSIS — M17.11 PRIMARY OSTEOARTHRITIS OF RIGHT KNEE: Primary | ICD-10-CM

## 2020-12-10 PROCEDURE — 97110 THERAPEUTIC EXERCISES: CPT

## 2020-12-10 PROCEDURE — 97140 MANUAL THERAPY 1/> REGIONS: CPT

## 2020-12-15 ENCOUNTER — EVALUATION (OUTPATIENT)
Dept: PHYSICAL THERAPY | Facility: CLINIC | Age: 75
End: 2020-12-15
Payer: MEDICARE

## 2020-12-15 DIAGNOSIS — Z96.651 HX OF TOTAL KNEE REPLACEMENT, RIGHT: ICD-10-CM

## 2020-12-15 DIAGNOSIS — M17.11 PRIMARY OSTEOARTHRITIS OF RIGHT KNEE: Primary | ICD-10-CM

## 2020-12-15 PROCEDURE — 97110 THERAPEUTIC EXERCISES: CPT | Performed by: PHYSICAL THERAPIST

## 2020-12-15 PROCEDURE — 97140 MANUAL THERAPY 1/> REGIONS: CPT | Performed by: PHYSICAL THERAPIST

## 2020-12-17 ENCOUNTER — APPOINTMENT (OUTPATIENT)
Dept: PHYSICAL THERAPY | Facility: CLINIC | Age: 75
End: 2020-12-17
Payer: MEDICARE

## 2020-12-18 ENCOUNTER — APPOINTMENT (OUTPATIENT)
Dept: PHYSICAL THERAPY | Facility: CLINIC | Age: 75
End: 2020-12-18
Payer: MEDICARE

## 2020-12-22 ENCOUNTER — APPOINTMENT (OUTPATIENT)
Dept: PHYSICAL THERAPY | Facility: CLINIC | Age: 75
End: 2020-12-22
Payer: MEDICARE

## 2020-12-24 ENCOUNTER — APPOINTMENT (OUTPATIENT)
Dept: PHYSICAL THERAPY | Facility: CLINIC | Age: 75
End: 2020-12-24
Payer: MEDICARE

## 2020-12-28 ENCOUNTER — OFFICE VISIT (OUTPATIENT)
Dept: OBGYN CLINIC | Facility: CLINIC | Age: 75
End: 2020-12-28

## 2020-12-28 VITALS
SYSTOLIC BLOOD PRESSURE: 138 MMHG | HEART RATE: 73 BPM | HEIGHT: 66 IN | BODY MASS INDEX: 27 KG/M2 | WEIGHT: 168 LBS | DIASTOLIC BLOOD PRESSURE: 82 MMHG

## 2020-12-28 DIAGNOSIS — Z96.651 HX OF TOTAL KNEE REPLACEMENT, RIGHT: Primary | ICD-10-CM

## 2020-12-28 PROCEDURE — 99024 POSTOP FOLLOW-UP VISIT: CPT | Performed by: ORTHOPAEDIC SURGERY

## 2021-03-10 DIAGNOSIS — Z23 ENCOUNTER FOR IMMUNIZATION: ICD-10-CM

## 2021-03-14 DIAGNOSIS — F32.89 OTHER DEPRESSION: ICD-10-CM

## 2021-03-14 DIAGNOSIS — I10 HYPERTENSION, UNSPECIFIED TYPE: ICD-10-CM

## 2021-03-14 DIAGNOSIS — F41.8 DEPRESSION WITH ANXIETY: ICD-10-CM

## 2021-03-14 RX ORDER — ESCITALOPRAM OXALATE 20 MG/1
TABLET ORAL
Qty: 90 TABLET | Refills: 1 | Status: SHIPPED | OUTPATIENT
Start: 2021-03-14 | End: 2021-10-25

## 2021-03-14 RX ORDER — LOSARTAN POTASSIUM AND HYDROCHLOROTHIAZIDE 12.5; 1 MG/1; MG/1
TABLET ORAL
Qty: 90 TABLET | Refills: 1 | Status: SHIPPED | OUTPATIENT
Start: 2021-03-14 | End: 2021-10-06

## 2021-03-14 RX ORDER — BUPROPION HYDROCHLORIDE 150 MG/1
TABLET ORAL
Qty: 90 TABLET | Refills: 1 | Status: SHIPPED | OUTPATIENT
Start: 2021-03-14 | End: 2021-10-25

## 2021-03-16 ENCOUNTER — IMMUNIZATIONS (OUTPATIENT)
Dept: FAMILY MEDICINE CLINIC | Facility: HOSPITAL | Age: 76
End: 2021-03-16

## 2021-03-16 DIAGNOSIS — Z23 ENCOUNTER FOR IMMUNIZATION: Primary | ICD-10-CM

## 2021-03-16 PROCEDURE — 0011A SARS-COV-2 / COVID-19 MRNA VACCINE (MODERNA) 100 MCG: CPT

## 2021-03-16 PROCEDURE — 91301 SARS-COV-2 / COVID-19 MRNA VACCINE (MODERNA) 100 MCG: CPT

## 2021-03-29 ENCOUNTER — OFFICE VISIT (OUTPATIENT)
Dept: OBGYN CLINIC | Facility: CLINIC | Age: 76
End: 2021-03-29
Payer: MEDICARE

## 2021-03-29 VITALS — HEIGHT: 66 IN | WEIGHT: 168 LBS | BODY MASS INDEX: 27 KG/M2

## 2021-03-29 DIAGNOSIS — Z96.651 HX OF TOTAL KNEE REPLACEMENT, RIGHT: Primary | ICD-10-CM

## 2021-03-29 PROCEDURE — 99213 OFFICE O/P EST LOW 20 MIN: CPT | Performed by: ORTHOPAEDIC SURGERY

## 2021-03-29 NOTE — PROGRESS NOTES
ASSESSMENT/PLAN:    Diagnoses and all orders for this visit:    Hx of total knee replacement, right  -     XR knee 3 vw right non injury; Future         x-rays of the patient's right knee show well-seated right total knee replacement  The prosthesis is in good alignment  There are no signs of loosening  There are no fractures or dislocations  Overall, the patient is continuing to do well since surgery  He should continue a home exercise program for strengthening, stretching range of motion  She will follow up with our office in 6 months with new x-rays of her right knee three views  The patient is acceptable to this plan  Return in about 6 months (around 9/29/2021)  _____________________________________________________  CHIEF COMPLAINT:  Chief Complaint   Patient presents with    Right Knee - Follow-up         SUBJECTIVE:  Marlys Zamudio is a 78 y o  female   Status post right total knee replaced from 10/07/2020  The patient presents to our office for 6 month follow-up visit  The patient states she has been doing very well since surgery  She denies any significant pain  She is walking without antalgic gait or to assist device  She denies any numbness or tingling  She denies any fever or chills      The following portions of the patient's history were reviewed and updated as appropriate: allergies, current medications, past family history, past medical history, past social history, past surgical history and problem list     PAST MEDICAL HISTORY:  Past Medical History:   Diagnosis Date    Arthritis     Asthma     Chronic kidney disease     CKD stage 3    Hyperlipidemia     Hypertension     Nasal polyps        PAST SURGICAL HISTORY:  Past Surgical History:   Procedure Laterality Date    COLONOSCOPY      DILATION AND CURETTAGE OF UTERUS      HYSTEROSCOPY      of uterus  d&c 1997    HYSTEROSCOPY W/ POLYPECTOMY      2000 and 2001  resolved    NOSE SURGERY      nasal endoscopy polypectomy    NOSE SURGERY      MT ESOPHAGOGASTRODUODENOSCOPY TRANSORAL DIAGNOSTIC N/A 2/6/2019    Procedure: ESOPHAGOGASTRODUODENOSCOPY (EGD); Surgeon: Shant Angela MD;  Location: [facility];   Service: Gastroenterology    [place] TOTAL KNEE ARTHROPLASTY Right 10/7/2020    Procedure: KNEE TOTAL ARTHROPLASTY;  Surgeon: Albertina Yoo DO;  Location: [address] [place] [address] MAIN OR;  Service: Orthopedics    TONSILLECTOMY      TOTAL KNEE ARTHROPLASTY Left        FAMILY HISTORY:  Family History   Problem Relation Age of Onset    Heart attack Mother     Osteoporosis Mother     Uterine cancer Mother     Heart disease Mother        SOCIAL HISTORY:  Social History     Tobacco Use    Smoking status: Never Smoker    Smokeless tobacco: Never Used   Substance Use Topics    Alcohol use: Not Currently     Comment: social    Drug use: No       MEDICATIONS:    Current Outpatient Medications:     acetaminophen (TYLENOL) 325 mg tablet, Take 2 tablets (650 mg total) by mouth every 4 (four) hours as needed for mild pain (fever > 100 5), Disp: , Rfl: 0    albuterol (2 5 mg/3 mL) 0 083 % nebulizer solution, Inhale 1 each every 4 (four) hours as needed , Disp: , Rfl:     albuterol (VENTOLIN HFA) 90 mcg/act inhaler, Inhale 2 puffs every 4 (four) hours as needed , Disp: , Rfl:     aspirin (ECOTRIN LOW STRENGTH) 81 mg EC tablet, Take 81 mg by mouth daily, Disp: , Rfl:     atorvastatin (LIPITOR) 10 mg tablet, TAKE 1 TABLET EVERY DAY, Disp: 90 tablet, Rfl: 1    buPROPion (WELLBUTRIN XL) 150 mg 24 hr tablet, TAKE 1 TABLET EVERY DAY, Disp: 90 tablet, Rfl: 1    Calcium Carbonate (CALTRATE 600) 1500 (600 Ca) MG TABS, Take 1 tablet by mouth 2 (two) times a day, Disp: , Rfl:     Cetirizine HCl (ZYRTEC ALLERGY PO), Take 1 tablet by mouth, Disp: , Rfl:     clindamycin (CLEOCIN) 150 mg capsule, TAKE FOUR CAPSULES BY MOUTH ONE HOUR BEFORE APPOINTMENT, Disp: , Rfl:     escitalopram (LEXAPRO) 20 mg tablet, TAKE 1 TABLET EVERY DAY, Disp: 90 tablet, Rfl: 1    esomeprazole (NexIUM) 40 MG capsule, Take 1 capsule (40 mg total) by mouth daily (Patient taking differently: Take 20 mg by mouth daily ), Disp: 30 capsule, Rfl: 3    fluticasone (FLONASE) 50 mcg/act nasal spray, into each nostril, Disp: , Rfl:     fluticasone-salmeterol (ADVAIR DISKUS) 100-50 mcg/dose inhaler, Inhale 1 puff every 12 (twelve) hours, Disp: 50 Inhaler, Rfl: 0    Ginger, Zingiber officinalis, (GINGER EXTRACT PO), Take by mouth, Disp: , Rfl:     losartan-hydrochlorothiazide (HYZAAR) 100-12 5 MG per tablet, TAKE 1 TABLET EVERY DAY, Disp: 90 tablet, Rfl: 1    ondansetron (ZOFRAN) 4 mg tablet, Take 1 tablet (4 mg total) by mouth every 8 (eight) hours as needed for nausea or vomiting, Disp: 20 tablet, Rfl: 0    Probiotic Product (PROBIOTIC DAILY PO), Take by mouth, Disp: , Rfl:     sucralfate (CARAFATE) 1 g tablet, Take 1 tablet (1 g total) by mouth 4 (four) times a day, Disp: 120 tablet, Rfl: 0    traMADol (ULTRAM) 50 mg tablet, Take 1 tablet (50 mg total) by mouth every 6 (six) hours as needed for moderate pain, Disp: 42 tablet, Rfl: 0    docusate sodium (COLACE) 100 mg capsule, Take 1 capsule (100 mg total) by mouth 2 (two) times a day (Patient taking differently: Take 100 mg by mouth 2 (two) times a day as needed for constipation ), Disp: 10 capsule, Rfl: 0    Multiple Vitamins-Minerals (multivitamin with minerals) tablet, Take 1 tablet by mouth daily, Disp: 30 tablet, Rfl: 0    ALLERGIES:  Allergies   Allergen Reactions    Clarithromycin GI Intolerance     Reaction Date: 12Apr2011;     Codeine Hives     Reaction Date: 12Apr2011;     Estradiol      Annotation - [id number]: ELEVATES BP    Hydrocodone Hives    Hydrocodone-Acetaminophen Rash    Meloxicam Edema     Feet and ankles swell    Tramadol Other (See Comments)     red,itchy       ROS:  Review of Systems     Constitutional: Negative for fatigue, fever or loss of appetite  HENT: Negative      Respiratory: Negative for shortness of breath, dyspnea  Cardiovascular: Negative for chest pain/tightness  Gastrointestinal: Negative for abdominal pain, N/V  Endocrine: Negative for cold/heat intolerance, unexplained weight loss/gain  Genitourinary: Negative for flank pain, dysuria, hematuria  Musculoskeletal:   Negative for arthralgia   Skin: Negative for rash  Neurological: Negative for numbness or tingling  Psychiatric/Behavioral: Negative for agitation  _____________________________________________________  PHYSICAL EXAMINATION:    Height 5' 6" (1 676 m), weight 76 2 kg (168 lb), not currently breastfeeding  Constitutional: Oriented to person, place, and time  Appears well-developed and well-nourished  No distress  HENT:   Head: Normocephalic  Eyes: Conjunctivae are normal  Right eye exhibits no discharge  Left eye exhibits no discharge  No scleral icterus  Cardiovascular: Normal rate  Pulmonary/Chest: Effort normal    Neurological: Alert and oriented to person, place, and time  Skin: Skin is warm and dry  No rash noted  Not diaphoretic  No erythema  No pallor  Psychiatric: Normal mood and affect  Behavior is normal  Judgment and thought content normal       MUSCULOSKELETAL EXAMINATION:   Physical Exam  Ortho Exam     right lower extremity is neurovascularly intact  Toes are pink and mobile   Compartments are soft   Negative Homans   Incision is well-healed   Brisk cap refill   Sensation intact   Range of motion of the knee is from 0-120 degrees   No ligament laxity   No warmth, erythema or ecchymosis present  Objective:  BP Readings from Last 1 Encounters:   12/28/20 138/82      Wt Readings from Last 1 Encounters:   03/29/21 76 2 kg (168 lb)        BMI:   Estimated body mass index is 27 12 kg/m² as calculated from the following:    Height as of this encounter: 5' 6" (1 676 m)  Weight as of this encounter: 76 2 kg (168 lb)      Radiographs:  _____________________________________________________  STUDIES REVIEWED:  I have personally reviewed pertinent films and reports in PACS  x-rays of the patient's right knee show well-seated right total knee replacement  The prosthesis is in good alignment  There are no signs of loosening  There are no fractures or dislocations        Barrett Duarte PA-C

## 2021-04-15 ENCOUNTER — IMMUNIZATIONS (OUTPATIENT)
Dept: FAMILY MEDICINE CLINIC | Facility: HOSPITAL | Age: 76
End: 2021-04-15

## 2021-04-15 DIAGNOSIS — Z23 ENCOUNTER FOR IMMUNIZATION: Primary | ICD-10-CM

## 2021-04-15 PROCEDURE — 0012A SARS-COV-2 / COVID-19 MRNA VACCINE (MODERNA) 100 MCG: CPT

## 2021-04-15 PROCEDURE — 91301 SARS-COV-2 / COVID-19 MRNA VACCINE (MODERNA) 100 MCG: CPT

## 2021-05-11 ENCOUNTER — APPOINTMENT (OUTPATIENT)
Dept: LAB | Facility: CLINIC | Age: 76
End: 2021-05-11
Payer: MEDICARE

## 2021-05-11 DIAGNOSIS — E78.2 MIXED HYPERLIPIDEMIA: ICD-10-CM

## 2021-05-11 DIAGNOSIS — I10 BENIGN ESSENTIAL HYPERTENSION: Chronic | ICD-10-CM

## 2021-05-11 DIAGNOSIS — D64.9 POSTOPERATIVE ANEMIA: ICD-10-CM

## 2021-05-11 DIAGNOSIS — E87.6 HYPOKALEMIA: ICD-10-CM

## 2021-05-11 LAB
ALBUMIN SERPL BCP-MCNC: 3.8 G/DL (ref 3.5–5)
ALP SERPL-CCNC: 78 U/L (ref 46–116)
ALT SERPL W P-5'-P-CCNC: 20 U/L (ref 12–78)
ANION GAP SERPL CALCULATED.3IONS-SCNC: 5 MMOL/L (ref 4–13)
AST SERPL W P-5'-P-CCNC: 12 U/L (ref 5–45)
BASOPHILS # BLD AUTO: 0.02 THOUSANDS/ΜL (ref 0–0.1)
BASOPHILS NFR BLD AUTO: 0 % (ref 0–1)
BILIRUB SERPL-MCNC: 0.47 MG/DL (ref 0.2–1)
BUN SERPL-MCNC: 21 MG/DL (ref 5–25)
CALCIUM SERPL-MCNC: 9.5 MG/DL (ref 8.3–10.1)
CHLORIDE SERPL-SCNC: 106 MMOL/L (ref 100–108)
CHOLEST SERPL-MCNC: 148 MG/DL (ref 50–200)
CO2 SERPL-SCNC: 31 MMOL/L (ref 21–32)
CREAT SERPL-MCNC: 1.03 MG/DL (ref 0.6–1.3)
EOSINOPHIL # BLD AUTO: 0.21 THOUSAND/ΜL (ref 0–0.61)
EOSINOPHIL NFR BLD AUTO: 3 % (ref 0–6)
ERYTHROCYTE [DISTWIDTH] IN BLOOD BY AUTOMATED COUNT: 12.2 % (ref 11.6–15.1)
GFR SERPL CREATININE-BSD FRML MDRD: 54 ML/MIN/1.73SQ M
GLUCOSE P FAST SERPL-MCNC: 93 MG/DL (ref 65–99)
HCT VFR BLD AUTO: 35.6 % (ref 34.8–46.1)
HDLC SERPL-MCNC: 50 MG/DL
HGB BLD-MCNC: 11.6 G/DL (ref 11.5–15.4)
IMM GRANULOCYTES # BLD AUTO: 0.02 THOUSAND/UL (ref 0–0.2)
IMM GRANULOCYTES NFR BLD AUTO: 0 % (ref 0–2)
LDLC SERPL CALC-MCNC: 55 MG/DL (ref 0–100)
LYMPHOCYTES # BLD AUTO: 0.81 THOUSANDS/ΜL (ref 0.6–4.47)
LYMPHOCYTES NFR BLD AUTO: 13 % (ref 14–44)
MCH RBC QN AUTO: 30.2 PG (ref 26.8–34.3)
MCHC RBC AUTO-ENTMCNC: 32.6 G/DL (ref 31.4–37.4)
MCV RBC AUTO: 93 FL (ref 82–98)
MONOCYTES # BLD AUTO: 0.52 THOUSAND/ΜL (ref 0.17–1.22)
MONOCYTES NFR BLD AUTO: 8 % (ref 4–12)
NEUTROPHILS # BLD AUTO: 4.61 THOUSANDS/ΜL (ref 1.85–7.62)
NEUTS SEG NFR BLD AUTO: 76 % (ref 43–75)
NONHDLC SERPL-MCNC: 98 MG/DL
NRBC BLD AUTO-RTO: 0 /100 WBCS
PLATELET # BLD AUTO: 223 THOUSANDS/UL (ref 149–390)
PMV BLD AUTO: 11.6 FL (ref 8.9–12.7)
POTASSIUM SERPL-SCNC: 3.9 MMOL/L (ref 3.5–5.3)
PROT SERPL-MCNC: 6.9 G/DL (ref 6.4–8.2)
RBC # BLD AUTO: 3.84 MILLION/UL (ref 3.81–5.12)
SODIUM SERPL-SCNC: 142 MMOL/L (ref 136–145)
TRIGL SERPL-MCNC: 213 MG/DL
WBC # BLD AUTO: 6.19 THOUSAND/UL (ref 4.31–10.16)

## 2021-05-11 PROCEDURE — 80061 LIPID PANEL: CPT

## 2021-05-11 PROCEDURE — 36415 COLL VENOUS BLD VENIPUNCTURE: CPT

## 2021-05-11 PROCEDURE — 80053 COMPREHEN METABOLIC PANEL: CPT

## 2021-05-11 PROCEDURE — 85025 COMPLETE CBC W/AUTO DIFF WBC: CPT

## 2021-05-20 ENCOUNTER — OFFICE VISIT (OUTPATIENT)
Dept: FAMILY MEDICINE CLINIC | Facility: CLINIC | Age: 76
End: 2021-05-20
Payer: MEDICARE

## 2021-05-20 VITALS
WEIGHT: 178 LBS | SYSTOLIC BLOOD PRESSURE: 118 MMHG | TEMPERATURE: 99 F | BODY MASS INDEX: 29.66 KG/M2 | OXYGEN SATURATION: 96 % | HEART RATE: 74 BPM | HEIGHT: 65 IN | DIASTOLIC BLOOD PRESSURE: 76 MMHG

## 2021-05-20 DIAGNOSIS — G89.29 CHRONIC LEFT SHOULDER PAIN: ICD-10-CM

## 2021-05-20 DIAGNOSIS — M25.512 CHRONIC LEFT SHOULDER PAIN: ICD-10-CM

## 2021-05-20 DIAGNOSIS — Z78.0 POSTMENOPAUSAL: ICD-10-CM

## 2021-05-20 DIAGNOSIS — M79.676 PAIN AND SWELLING OF TOE, UNSPECIFIED LATERALITY: ICD-10-CM

## 2021-05-20 DIAGNOSIS — J45.909 ASTHMA, UNSPECIFIED ASTHMA SEVERITY, UNSPECIFIED WHETHER COMPLICATED, UNSPECIFIED WHETHER PERSISTENT: Primary | ICD-10-CM

## 2021-05-20 DIAGNOSIS — Z82.69 FAMILY HISTORY OF RHEUMATIC JOINT DISEASE: ICD-10-CM

## 2021-05-20 DIAGNOSIS — Z12.31 BREAST CANCER SCREENING BY MAMMOGRAM: ICD-10-CM

## 2021-05-20 DIAGNOSIS — M79.89 PAIN AND SWELLING OF TOE, UNSPECIFIED LATERALITY: ICD-10-CM

## 2021-05-20 DIAGNOSIS — M25.50 ARTHRALGIA OF MULTIPLE JOINTS: ICD-10-CM

## 2021-05-20 DIAGNOSIS — F33.9 DEPRESSION, RECURRENT (HCC): ICD-10-CM

## 2021-05-20 DIAGNOSIS — M25.541 ARTHRALGIA OF BOTH HANDS: ICD-10-CM

## 2021-05-20 DIAGNOSIS — M25.542 ARTHRALGIA OF BOTH HANDS: ICD-10-CM

## 2021-05-20 DIAGNOSIS — Z79.51 LONG TERM (CURRENT) USE OF INHALED STEROIDS: ICD-10-CM

## 2021-05-20 PROCEDURE — 99214 OFFICE O/P EST MOD 30 MIN: CPT | Performed by: PHYSICIAN ASSISTANT

## 2021-05-20 RX ORDER — TRAMADOL HYDROCHLORIDE 50 MG/1
50 TABLET ORAL EVERY 8 HOURS PRN
Qty: 30 TABLET | Refills: 0 | Status: SHIPPED | OUTPATIENT
Start: 2021-05-20 | End: 2021-12-03

## 2021-05-20 NOTE — PROGRESS NOTES
Routine Follow-up    Getachew Maria 78 y o  female   Date:  5/20/2021      Assessment and Plan:    Aga Hobbs was seen today for follow-up and rheumatoid arthritis  Diagnoses and all orders for this visit:    Asthma, unspecified asthma severity, unspecified whether complicated, unspecified whether persistent  -     fluticasone-salmeterol (Advair Diskus) 100-50 mcg/dose inhaler; Inhale 1 puff every 12 (twelve) hours    Breast cancer screening by mammogram  -     Mammo screening bilateral w 3d & cad; Future    Depression, recurrent ([address] )  - stable on lexapro and wellbutrin    Postmenopausal  -     DXA bone density spine hip and pelvis; Future    Family history of rheumatic joint disease  -     C-reactive protein; Future  -     RF Screen w/ Reflex to Titer; Future  -     LAQUITA Screen w/ Reflex to Titer/Pattern; Future  -     Vitamin D 25 hydroxy; Future  -     XR hand 3+ vw right; Future  -     XR hand 3+ vw left; Future  -     XR shoulder 2+ vw left; Future    Arthralgia of multiple joints  -     C-reactive protein; Future  -     RF Screen w/ Reflex to Titer; Future  -     LAQUITA Screen w/ Reflex to Titer/Pattern; Future  -     Vitamin D 25 hydroxy; Future  -     XR hand 3+ vw right; Future  -     XR hand 3+ vw left; Future  -     XR shoulder 2+ vw left; Future  -     Uric acid; Future    Long term (current) use of inhaled steroids   -     Vitamin D 25 hydroxy; Future    Arthralgia of both hands  -     XR hand 3+ vw right; Future  -     XR hand 3+ vw left; Future  -     XR shoulder 2+ vw left; Future  -     traMADol (ULTRAM) 50 mg tablet; Take 1 tablet (50 mg total) by mouth every 8 (eight) hours as needed for moderate pain or severe pain  - maxing out tylenol, cannot take NSAIDS  - she understands this is a controlled substance, use more severe pain, can try to take at night time    Chronic left shoulder pain  -     XR shoulder 2+ vw left;  Future    Pain and swelling of toe, unspecified laterality  -     Uric acid; Future               HPI:  Chief Complaint   Patient presents with    Follow-up     6 month check   Rheumatoid Arthritis     Patient states her arthritis has been acting up and said tylenol does not help  HPI   Patient is a 79 yo female who presents for routine follow up  Her arthritis is really bothering her - hands, neck, shoulders  She takes tylenol max dose without relief  She cannot take NSAIDs due to stomach  She is not sure what else she can take  She has been using icy/hot with some relief to get to sleep  Her L middle toe gets swollen without injury  Her Bp is stable  She only takes colace prn  She is taking nexium 20 mg otc daily  She stopped seeing her allergist due to Ligia Winslow  She has been having flare up of allergies and will consider going back for shots again  Her recent blood work was reviewed  ROS: Review of Systems   Constitutional: Negative for appetite change, diaphoresis and fever  HENT: Positive for congestion  Negative for ear pain  Respiratory: Negative  Cardiovascular: Negative  Gastrointestinal: Positive for constipation  Negative for abdominal pain, nausea and vomiting  GERD   Musculoskeletal: Positive for arthralgias and joint swelling  Negative for gait problem  Skin: Negative  Allergic/Immunologic: Positive for environmental allergies  Neurological: Negative for dizziness, seizures, syncope and numbness         Past Medical History:   Diagnosis Date    Arthritis     Asthma     Chronic kidney disease     CKD stage 3    Hyperlipidemia     Hypertension     Nasal polyps      Patient Active Problem List   Diagnosis    Age-related nuclear cataract, bilateral    Allergic rhinitis    Anemia    Benign essential hypertension    Central sleep apnea    Irritable bowel syndrome with diarrhea    CKD (chronic kidney disease), stage III (HCC)    Depression with anxiety    Dyslipidemia    Hypoproteinemia (HCC)    Loss of height    Mild persistent asthma without complication    Nephrosclerosis    Osteoarthritis of left knee    Osteopenia    Overactive bladder    Primary insomnia    Vitamin B12 deficiency    Vitamin D deficiency    Chondromalacia patellae    Greater trochanteric pain syndrome    Gastroesophageal reflux disease without esophagitis    Primary osteoarthritis of right knee    Hx of total knee replacement, right    Depression, recurrent ([address] )       Past Surgical History:   Procedure Laterality Date    COLONOSCOPY      DILATION AND CURETTAGE OF UTERUS      HYSTEROSCOPY      of uterus  d&c 1997    HYSTEROSCOPY W/ POLYPECTOMY      2000 and 2001  resolved    NOSE SURGERY      nasal endoscopy polypectomy    NOSE SURGERY      [place] ESOPHAGOGASTRODUODENOSCOPY TRANSORAL DIAGNOSTIC N/A 2/6/2019    Procedure: ESOPHAGOGASTRODUODENOSCOPY (EGD); Surgeon: Jing Moore MD;  Location: [facility] GI LAB;   Service: Gastroenterology    [place] TOTAL KNEE ARTHROPLASTY Right 10/7/2020    Procedure: KNEE TOTAL ARTHROPLASTY;  Surgeon: Mara Murphy DO;  Location: [address] [place] [address];  Service: Orthopedics    TONSILLECTOMY      TOTAL KNEE ARTHROPLASTY Left        Social History     Socioeconomic History    Marital status: /Civil Union     Spouse name: None    Number of children: None    Years of education: None    Highest education level: None   Occupational History    None   Social Needs    Financial resource strain: None    Food insecurity     Worry: None     Inability: None    Transportation needs     Medical: No     Non-medical: No   Tobacco Use    Smoking status: Never Smoker    Smokeless tobacco: Never Used   Substance and Sexual Activity    Alcohol use: Not Currently     Comment: social    Drug use: No    Sexual activity: Not Currently   Lifestyle    Physical activity     Days per week: 0 days     Minutes per session: 0 min    Stress: None   Relationships    Social connections     Talks on phone: None     Gets together: None     Attends Sikh service: None     Active member of club or organization: None     Attends meetings of clubs or organizations: None     Relationship status: None    Intimate partner violence     Fear of current or ex partner: None     Emotionally abused: None     Physically abused: None     Forced sexual activity: None   Other Topics Concern    None   Social History Narrative    Uses safety equipment  Seat belt       Family History   Problem Relation Age of Onset    Heart attack Mother     Osteoporosis Mother     Uterine cancer Mother     Heart disease Mother     No Known Problems Father     No Known Problems Daughter     No Known Problems Maternal Grandmother     No Known Problems Maternal Aunt        Allergies   Allergen Reactions    Clarithromycin GI Intolerance     Reaction Date: 12Apr2011;     Codeine Hives     Reaction Date: 12Apr2011;     Estradiol      Annotation - [id number]: ELEVATES BP    Hydrocodone Hives    Hydrocodone-Acetaminophen Rash    Meloxicam Edema     Feet and ankles swell    Tramadol Other (See Comments)     red,itchy         Current Outpatient Medications:     acetaminophen (TYLENOL) 325 mg tablet, Take 2 tablets (650 mg total) by mouth every 4 (four) hours as needed for mild pain (fever > 100 5), Disp: , Rfl: 0    albuterol (2 5 mg/3 mL) 0 083 % nebulizer solution, Inhale 1 each every 4 (four) hours as needed , Disp: , Rfl:     atorvastatin (LIPITOR) 10 mg tablet, TAKE 1 TABLET EVERY DAY, Disp: 90 tablet, Rfl: 1    buPROPion (WELLBUTRIN XL) 150 mg 24 hr tablet, TAKE 1 TABLET EVERY DAY, Disp: 90 tablet, Rfl: 1    Calcium Carbonate (CALTRATE 600) 1500 (600 Ca) MG TABS, Take 1 tablet by mouth 2 (two) times a day, Disp: , Rfl:     Cetirizine HCl (ZYRTEC ALLERGY PO), Take 1 tablet by mouth, Disp: , Rfl:     clindamycin (CLEOCIN) 150 mg capsule, TAKE FOUR CAPSULES BY MOUTH ONE HOUR BEFORE APPOINTMENT, Disp: , Rfl:     docusate sodium (COLACE) 100 mg capsule, Take 1 capsule (100 mg total) by mouth 2 (two) times a day (Patient taking differently: Take 100 mg by mouth 2 (two) times a day as needed for constipation ), Disp: 10 capsule, Rfl: 0    escitalopram (LEXAPRO) 20 mg tablet, TAKE 1 TABLET EVERY DAY, Disp: 90 tablet, Rfl: 1    esomeprazole (NexIUM) 40 MG capsule, Take 1 capsule (40 mg total) by mouth daily (Patient taking differently: Take 20 mg by mouth daily ), Disp: 30 capsule, Rfl: 3    fluticasone (FLONASE) 50 mcg/act nasal spray, into each nostril, Disp: , Rfl:     Ginger, Zingiber officinalis, (GINGER EXTRACT PO), Take by mouth, Disp: , Rfl:     losartan-hydrochlorothiazide (HYZAAR) 100-12 5 MG per tablet, TAKE 1 TABLET EVERY DAY, Disp: 90 tablet, Rfl: 1    Multiple Vitamins-Minerals (multivitamin with minerals) tablet, Take 1 tablet by mouth daily, Disp: 30 tablet, Rfl: 0    Probiotic Product (PROBIOTIC DAILY PO), Take by mouth, Disp: , Rfl:     albuterol (VENTOLIN HFA) 90 mcg/act inhaler, Inhale 2 puffs every 4 (four) hours as needed , Disp: , Rfl:     fluticasone-salmeterol (Advair Diskus) 100-50 mcg/dose inhaler, Inhale 1 puff every 12 (twelve) hours, Disp: 60 each, Rfl: 1    traMADol (ULTRAM) 50 mg tablet, Take 1 tablet (50 mg total) by mouth every 8 (eight) hours as needed for moderate pain or severe pain, Disp: 30 tablet, Rfl: 0      Physical Exam:  /76 (BP Location: Left arm, Patient Position: Sitting, Cuff Size: Standard)   Pulse 74   Temp 99 °F (37 2 °C) (Tympanic)   Ht 5' 4 5" (1 638 m)   Wt 80 7 kg (178 lb)   SpO2 96%   BMI 30 08 kg/m²     Physical Exam  Constitutional:       General: She is not in acute distress  Appearance: Normal appearance  HENT:      Head: Normocephalic and atraumatic  Right Ear: Tympanic membrane, ear canal and external ear normal       Left Ear: Tympanic membrane, ear canal and external ear normal       Nose: Congestion present        Mouth/Throat:      Mouth: Mucous membranes are moist       Pharynx: Oropharynx is clear  Eyes:      Conjunctiva/sclera: Conjunctivae normal    Cardiovascular:      Rate and Rhythm: Normal rate and regular rhythm  Heart sounds: No murmur  Pulmonary:      Effort: Pulmonary effort is normal  No respiratory distress  Breath sounds: Normal breath sounds  Musculoskeletal:         General: Swelling (L middle toe) present  Right lower leg: No edema  Left lower leg: No edema  Skin:     General: Skin is dry  Coloration: Skin is not pale  Findings: No bruising or erythema  Neurological:      General: No focal deficit present  Mental Status: She is alert and oriented to person, place, and time     Psychiatric:         Mood and Affect: Mood normal          Behavior: Behavior normal            Labs:  Lab Results   Component Value Date    WBC 6 19 05/11/2021    HGB 11 6 05/11/2021    HCT 35 6 05/11/2021    MCV 93 05/11/2021     05/11/2021     Lab Results   Component Value Date    K 3 9 05/11/2021     05/11/2021    CO2 31 05/11/2021    BUN 21 05/11/2021    CREATININE 1 03 05/11/2021    GLUF 93 05/11/2021    CALCIUM 9 5 05/11/2021    AST 12 05/11/2021    ALT 20 05/11/2021    ALKPHOS 78 05/11/2021    EGFR 54 05/11/2021

## 2021-05-21 ENCOUNTER — APPOINTMENT (OUTPATIENT)
Dept: LAB | Facility: CLINIC | Age: 76
End: 2021-05-21
Payer: MEDICARE

## 2021-05-21 ENCOUNTER — APPOINTMENT (OUTPATIENT)
Dept: RADIOLOGY | Facility: CLINIC | Age: 76
End: 2021-05-21
Payer: MEDICARE

## 2021-05-21 DIAGNOSIS — G89.29 CHRONIC LEFT SHOULDER PAIN: ICD-10-CM

## 2021-05-21 DIAGNOSIS — M25.542 ARTHRALGIA OF BOTH HANDS: ICD-10-CM

## 2021-05-21 DIAGNOSIS — M79.676 PAIN AND SWELLING OF TOE, UNSPECIFIED LATERALITY: ICD-10-CM

## 2021-05-21 DIAGNOSIS — M25.541 ARTHRALGIA OF BOTH HANDS: ICD-10-CM

## 2021-05-21 DIAGNOSIS — M25.512 CHRONIC LEFT SHOULDER PAIN: ICD-10-CM

## 2021-05-21 DIAGNOSIS — Z82.69 FAMILY HISTORY OF RHEUMATIC JOINT DISEASE: ICD-10-CM

## 2021-05-21 DIAGNOSIS — M25.50 ARTHRALGIA OF MULTIPLE JOINTS: ICD-10-CM

## 2021-05-21 DIAGNOSIS — M79.89 PAIN AND SWELLING OF TOE, UNSPECIFIED LATERALITY: ICD-10-CM

## 2021-05-21 DIAGNOSIS — Z79.51 LONG TERM (CURRENT) USE OF INHALED STEROIDS: ICD-10-CM

## 2021-05-21 LAB
25(OH)D3 SERPL-MCNC: 35.4 NG/ML (ref 30–100)
CRP SERPL QL: 7.7 MG/L
URATE SERPL-MCNC: 4.5 MG/DL (ref 2–6.8)

## 2021-05-21 PROCEDURE — 86430 RHEUMATOID FACTOR TEST QUAL: CPT

## 2021-05-21 PROCEDURE — 84550 ASSAY OF BLOOD/URIC ACID: CPT

## 2021-05-21 PROCEDURE — 82306 VITAMIN D 25 HYDROXY: CPT

## 2021-05-21 PROCEDURE — 73030 X-RAY EXAM OF SHOULDER: CPT

## 2021-05-21 PROCEDURE — 73130 X-RAY EXAM OF HAND: CPT

## 2021-05-21 PROCEDURE — 86140 C-REACTIVE PROTEIN: CPT

## 2021-05-21 PROCEDURE — 86038 ANTINUCLEAR ANTIBODIES: CPT

## 2021-05-21 PROCEDURE — 36415 COLL VENOUS BLD VENIPUNCTURE: CPT

## 2021-05-24 LAB
RHEUMATOID FACT SER QL LA: NEGATIVE
RYE IGE QN: NEGATIVE

## 2021-05-27 ENCOUNTER — HOSPITAL ENCOUNTER (OUTPATIENT)
Dept: BONE DENSITY | Facility: HOSPITAL | Age: 76
Discharge: HOME | End: 2021-05-27
Payer: MEDICARE

## 2021-05-27 ENCOUNTER — HOSPITAL ENCOUNTER (OUTPATIENT)
Dept: MAMMOGRAPHY | Facility: HOSPITAL | Age: 76
Discharge: HOME | End: 2021-05-27
Payer: MEDICARE

## 2021-05-27 VITALS — HEIGHT: 65 IN | WEIGHT: 178 LBS | BODY MASS INDEX: 29.66 KG/M2

## 2021-05-27 DIAGNOSIS — Z78.0 POSTMENOPAUSAL: ICD-10-CM

## 2021-05-27 DIAGNOSIS — Z12.31 BREAST CANCER SCREENING BY MAMMOGRAM: ICD-10-CM

## 2021-05-27 PROCEDURE — 77063 BREAST TOMOSYNTHESIS BI: CPT

## 2021-05-27 PROCEDURE — 77080 DXA BONE DENSITY AXIAL: CPT

## 2021-05-27 PROCEDURE — 77067 SCR MAMMO BI INCL CAD: CPT

## 2021-07-23 ENCOUNTER — OFFICE VISIT (OUTPATIENT)
Dept: FAMILY MEDICINE CLINIC | Facility: CLINIC | Age: 76
End: 2021-07-23
Payer: MEDICARE

## 2021-07-23 VITALS
WEIGHT: 177 LBS | HEART RATE: 68 BPM | HEIGHT: 66 IN | BODY MASS INDEX: 28.45 KG/M2 | OXYGEN SATURATION: 97 % | SYSTOLIC BLOOD PRESSURE: 140 MMHG | TEMPERATURE: 97.6 F | DIASTOLIC BLOOD PRESSURE: 80 MMHG

## 2021-07-23 DIAGNOSIS — M79.10 MYALGIA: ICD-10-CM

## 2021-07-23 DIAGNOSIS — Z82.69 FAMILY HISTORY OF RHEUMATIC JOINT DISEASE: ICD-10-CM

## 2021-07-23 DIAGNOSIS — M25.50 ARTHRALGIA OF MULTIPLE JOINTS: ICD-10-CM

## 2021-07-23 DIAGNOSIS — R79.82 ELEVATED C-REACTIVE PROTEIN (CRP): Primary | ICD-10-CM

## 2021-07-23 PROCEDURE — 99213 OFFICE O/P EST LOW 20 MIN: CPT | Performed by: PHYSICIAN ASSISTANT

## 2021-07-23 RX ORDER — PREDNISONE 20 MG/1
20 TABLET ORAL 2 TIMES DAILY WITH MEALS
Qty: 10 TABLET | Refills: 0 | Status: SHIPPED | OUTPATIENT
Start: 2021-07-23 | End: 2021-07-28

## 2021-07-23 RX ORDER — METHOCARBAMOL 500 MG/1
500 TABLET, FILM COATED ORAL
Qty: 30 TABLET | Refills: 0 | Status: SHIPPED | OUTPATIENT
Start: 2021-07-23

## 2021-07-23 NOTE — PROGRESS NOTES
Jayda Bucio 78 y o  female   Date:  7/23/2021      Assessment and Plan:    Gino Kaye was seen today for edema  Diagnoses and all orders for this visit:    Elevated C-reactive protein (CRP)  -     predniSONE 20 mg tablet; Take 1 tablet (20 mg total) by mouth 2 (two) times a day with meals for 5 days  -     Ambulatory referral to Rheumatology; Future  -     Ambulatory referral to Rheumatology; Future    Family history of rheumatic joint disease  -     predniSONE 20 mg tablet; Take 1 tablet (20 mg total) by mouth 2 (two) times a day with meals for 5 days  -     Ambulatory referral to Rheumatology; Future  -     Ambulatory referral to Rheumatology; Future    Arthralgia of multiple joints  -     predniSONE 20 mg tablet; Take 1 tablet (20 mg total) by mouth 2 (two) times a day with meals for 5 days  -     Ambulatory referral to Rheumatology; Future  -     Ambulatory referral to Rheumatology; Future    Myalgia  -     methocarbamol (ROBAXIN) 500 mg tablet; Take 1 tablet (500 mg total) by mouth daily at bedtime as needed for muscle spasms  -     Ambulatory referral to Rheumatology; Future  -     Ambulatory referral to Rheumatology; Future           HPI:  Chief Complaint   Patient presents with    Edema     Patient states she is having nflammation throughout her body  HPI   Patient is a 79 yo female with PMH sig for family hx of RA, elevated crp and multiple arthralgias who made same day appt for worsening joints pains  She takes tylenol and tramadol with little relief  She wonders if a short course of a steroid would provide her with some relief  She made an appt with Rheumatology on her own accord, cannot get in for months  We will place referral today for 2 options  She apreciates muscle cramps through legs/thighs too  ROS: Review of Systems   Constitutional: Negative  Respiratory: Negative  Cardiovascular: Negative  Musculoskeletal: Positive for arthralgias and myalgias     Skin: Negative  Past Medical History:   Diagnosis Date    Arthritis     Asthma     Chronic kidney disease     CKD stage 3    Hyperlipidemia     Hypertension     Nasal polyps      Patient Active Problem List   Diagnosis    Age-related nuclear cataract, bilateral    Allergic rhinitis    Anemia    Benign essential hypertension    Central sleep apnea    Irritable bowel syndrome with diarrhea    CKD (chronic kidney disease), stage III (HCC)    Depression with anxiety    Dyslipidemia    Hypoproteinemia (HCC)    Loss of height    Mild persistent asthma without complication    Nephrosclerosis    Osteoarthritis of left knee    Osteopenia    Overactive bladder    Primary insomnia    Vitamin B12 deficiency    Vitamin D deficiency    Chondromalacia patellae    Greater trochanteric pain syndrome    Gastroesophageal reflux disease without esophagitis    Primary osteoarthritis of right knee    Hx of total knee replacement, right    Depression, recurrent (HCC)       Past Surgical History:   Procedure Laterality Date    COLONOSCOPY      DILATION AND CURETTAGE OF UTERUS      HYSTEROSCOPY      of uterus  d&c 1997    HYSTEROSCOPY W/ POLYPECTOMY      2000 and 2001  resolved    NOSE SURGERY      nasal endoscopy polypectomy    NOSE SURGERY      NM ESOPHAGOGASTRODUODENOSCOPY TRANSORAL DIAGNOSTIC N/A 2/6/2019    Procedure: ESOPHAGOGASTRODUODENOSCOPY (EGD); Surgeon: Cinthia Carter MD;  Location: [facility] GI LAB;   Service: Gastroenterology    NM TOTAL KNEE ARTHROPLASTY Right 10/7/2020    Procedure: KNEE TOTAL ARTHROPLASTY;  Surgeon: Kimberly Longoria DO;  Location: [address] [place] [address] OR;  Service: Orthopedics    TONSILLECTOMY      TOTAL KNEE ARTHROPLASTY Left        Social History     Socioeconomic History    Marital status: /Civil Union     Spouse name: None    Number of children: None    Years of education: None    Highest education level: None   Occupational History    None   Tobacco Use    Smoking status: Never Smoker    Smokeless tobacco: Never Used   Vaping Use    Vaping Use: Never used   Substance and Sexual Activity    Alcohol use: Yes     Comment: social    Drug use: No    Sexual activity: Not Currently   Other Topics Concern    None   Social History Narrative    Uses safety equipment  Seat belt     Social Determinants of Health     Financial Resource Strain:     Difficulty of Paying Living Expenses:    Food Insecurity:     Worried About Running Out of Food in the Last Year:     Ran Out of Food in the Last Year:    Transportation Needs: No Transportation Needs    Lack of Transportation (Medical): No    Lack of Transportation (Non-Medical):  No   Physical Activity: Inactive    Days of Exercise per Week: 0 days    Minutes of Exercise per Session: 0 min   Stress:     Feeling of Stress :    Social Connections:     Frequency of Communication with Friends and Family:     Frequency of Social Gatherings with Friends and Family:     Attends [employer] Services:     Active Member of Clubs or Organizations:     Attends Club or Organization Meetings:     Marital Status:    Intimate Partner Violence:     Fear of Current or Ex-Partner:     Emotionally Abused:     Physically Abused:     Sexually Abused:        Family History   Problem Relation Age of Onset    Heart attack Mother     Osteoporosis Mother     Uterine cancer Mother     Heart disease Mother     No Known Problems Father     No Known Problems Daughter     No Known Problems Maternal Grandmother     No Known Problems Maternal Aunt        Allergies   Allergen Reactions    Clarithromycin GI Intolerance     Reaction Date: 12Apr2011;     Codeine Hives     Reaction Date: 12Apr2011;     Estradiol      Annotation - [id number]: ELEVATES BP    Hydrocodone Hives    Hydrocodone-Acetaminophen Rash    Meloxicam Edema     Feet and ankles swell    Tramadol Other (See Comments)     red,itchy         Current Outpatient Medications:    acetaminophen (TYLENOL) 325 mg tablet, Take 2 tablets (650 mg total) by mouth every 4 (four) hours as needed for mild pain (fever > 100 5), Disp: , Rfl: 0    albuterol (2 5 mg/3 mL) 0 083 % nebulizer solution, Inhale 1 each every 4 (four) hours as needed , Disp: , Rfl:     atorvastatin (LIPITOR) 10 mg tablet, TAKE 1 TABLET EVERY DAY, Disp: 90 tablet, Rfl: 1    buPROPion (WELLBUTRIN XL) 150 mg 24 hr tablet, TAKE 1 TABLET EVERY DAY, Disp: 90 tablet, Rfl: 1    Calcium Carbonate (CALTRATE 600) 1500 (600 Ca) MG TABS, Take 1 tablet by mouth 2 (two) times a day, Disp: , Rfl:     Cetirizine HCl (ZYRTEC ALLERGY PO), Take 1 tablet by mouth, Disp: , Rfl:     clindamycin (CLEOCIN) 150 mg capsule, TAKE FOUR CAPSULES BY MOUTH ONE HOUR BEFORE APPOINTMENT, Disp: , Rfl:     docusate sodium (COLACE) 100 mg capsule, Take 1 capsule (100 mg total) by mouth 2 (two) times a day (Patient taking differently: Take 100 mg by mouth 2 (two) times a day as needed for constipation ), Disp: 10 capsule, Rfl: 0    escitalopram (LEXAPRO) 20 mg tablet, TAKE 1 TABLET EVERY DAY, Disp: 90 tablet, Rfl: 1    esomeprazole (NexIUM) 40 MG capsule, Take 1 capsule (40 mg total) by mouth daily (Patient taking differently: Take 20 mg by mouth daily ), Disp: 30 capsule, Rfl: 3    fluticasone (FLONASE) 50 mcg/act nasal spray, into each nostril, Disp: , Rfl:     fluticasone-salmeterol (Advair Diskus) 100-50 mcg/dose inhaler, Inhale 1 puff every 12 (twelve) hours, Disp: 60 each, Rfl: 1    Ginger, Zingiber officinalis, (GINGER EXTRACT PO), Take by mouth, Disp: , Rfl:     losartan-hydrochlorothiazide (HYZAAR) 100-12 5 MG per tablet, TAKE 1 TABLET EVERY DAY, Disp: 90 tablet, Rfl: 1    Multiple Vitamins-Minerals (multivitamin with minerals) tablet, Take 1 tablet by mouth daily, Disp: 30 tablet, Rfl: 0    Probiotic Product (PROBIOTIC DAILY PO), Take by mouth, Disp: , Rfl:     traMADol (ULTRAM) 50 mg tablet, Take 1 tablet (50 mg total) by mouth every 8 (eight) hours as needed for moderate pain or severe pain, Disp: 30 tablet, Rfl: 0    albuterol (VENTOLIN HFA) 90 mcg/act inhaler, Inhale 2 puffs every 4 (four) hours as needed  (Patient not taking: Reported on 7/23/2021), Disp: , Rfl:     methocarbamol (ROBAXIN) 500 mg tablet, Take 1 tablet (500 mg total) by mouth daily at bedtime as needed for muscle spasms, Disp: 30 tablet, Rfl: 0    predniSONE 20 mg tablet, Take 1 tablet (20 mg total) by mouth 2 (two) times a day with meals for 5 days, Disp: 10 tablet, Rfl: 0      Physical Exam:  /80 (BP Location: Left arm, Patient Position: Sitting, Cuff Size: Standard)   Pulse 68   Temp 97 6 °F (36 4 °C) (Tympanic)   Ht 5' 5 5" (1 664 m)   Wt 80 3 kg (177 lb)   SpO2 97%   BMI 29 01 kg/m²     Physical Exam  Constitutional:       General: She is not in acute distress  Appearance: Normal appearance  HENT:      Head: Normocephalic and atraumatic  Eyes:      Conjunctiva/sclera: Conjunctivae normal       Pupils: Pupils are equal, round, and reactive to light  Cardiovascular:      Rate and Rhythm: Normal rate and regular rhythm  Pulmonary:      Effort: Pulmonary effort is normal  No respiratory distress  Musculoskeletal:         General: No deformity  Skin:     General: Skin is warm and dry  Coloration: Skin is not pale  Findings: No rash  Neurological:      General: No focal deficit present  Mental Status: She is alert and oriented to person, place, and time  Cranial Nerves: No cranial nerve deficit     Psychiatric:         Mood and Affect: Mood normal            Labs:  Lab Results   Component Value Date    WBC 6 19 05/11/2021    HGB 11 6 05/11/2021    HCT 35 6 05/11/2021    MCV 93 05/11/2021     05/11/2021     Lab Results   Component Value Date    K 3 9 05/11/2021     05/11/2021    CO2 31 05/11/2021    BUN 21 05/11/2021    CREATININE 1 03 05/11/2021    GLUF 93 05/11/2021    CALCIUM 9 5 05/11/2021    AST 12 05/11/2021 ALT 20 05/11/2021    ALKPHOS 78 05/11/2021    EGFR 54 05/11/2021

## 2021-07-26 ENCOUNTER — OFFICE VISIT (OUTPATIENT)
Dept: OBGYN CLINIC | Facility: CLINIC | Age: 76
End: 2021-07-26
Payer: MEDICARE

## 2021-07-26 VITALS
WEIGHT: 178 LBS | HEIGHT: 66 IN | HEART RATE: 79 BPM | BODY MASS INDEX: 28.61 KG/M2 | SYSTOLIC BLOOD PRESSURE: 161 MMHG | DIASTOLIC BLOOD PRESSURE: 75 MMHG

## 2021-07-26 DIAGNOSIS — M19.012 PRIMARY OSTEOARTHRITIS OF LEFT SHOULDER: Primary | ICD-10-CM

## 2021-07-26 PROCEDURE — 99213 OFFICE O/P EST LOW 20 MIN: CPT | Performed by: ORTHOPAEDIC SURGERY

## 2021-07-26 NOTE — PROGRESS NOTES
Assessment/Plan:  Assessment/Plan   Diagnoses and all orders for this visit:    Primary osteoarthritis of left shoulder         the patient is asymptomatic at this point  She has been on prednisone for the past 5 days  Continue home exercise program   Continue stretching  Follow up in October for re-evaluation as well as an annual examination and x-rays of her right knee-three views  If her condition changes, she will not hesitate to let us know    Subjective:   Patient ID: Pollo Burks is a 78 y o  female  HPI     the patient was having global body pain after her 1st COVID injection  She then stated that she was having trouble with her left shoulder prior to this  She denies any specific trauma  She denies any numbness or tingling  She has been on prednisone for the past 5 days which helped all of her symptomatology  She offers no complaints of pain today  She denies any numbness or tingling  She denies any neck pain     Temitope Vega is a well established patient within this practice who presents today for evaluation of new issue of bilateral shoulder pain  The following portions of the patient's history were reviewed and updated as appropriate: allergies, current medications, past family history, past medical history, past social history, past surgical history and problem list     Past Medical History:   Diagnosis Date    Arthritis     Asthma     Chronic kidney disease     CKD stage 3    Hyperlipidemia     Hypertension     Nasal polyps      Past Surgical History:   Procedure Laterality Date    COLONOSCOPY      DILATION AND CURETTAGE OF UTERUS      HYSTEROSCOPY      of uterus  d&c 1997    HYSTEROSCOPY W/ POLYPECTOMY      2000 and 2001  resolved    NOSE SURGERY      nasal endoscopy polypectomy    NOSE SURGERY      FL ESOPHAGOGASTRODUODENOSCOPY TRANSORAL DIAGNOSTIC N/A 2/6/2019    Procedure: ESOPHAGOGASTRODUODENOSCOPY (EGD);   Surgeon: Yannick Boyd MD;  Location: [facility] GI LAB; Service: Gastroenterology    IA TOTAL KNEE ARTHROPLASTY Right 10/7/2020    Procedure: KNEE TOTAL ARTHROPLASTY;  Surgeon: Ervin Gabriel DO;  Location: [facility] MAIN OR;  Service: Orthopedics    TONSILLECTOMY      TOTAL KNEE ARTHROPLASTY Left      Family History   Problem Relation Age of Onset    Heart attack Mother     Osteoporosis Mother     Uterine cancer Mother     Heart disease Mother     No Known Problems Father     No Known Problems Daughter     No Known Problems Maternal Grandmother     No Known Problems Maternal Aunt      Social History     Socioeconomic History    Marital status: /Civil Union     Spouse name: None    Number of children: None    Years of education: None    Highest education level: None   Occupational History    None   Tobacco Use    Smoking status: Never Smoker    Smokeless tobacco: Never Used   Vaping Use    Vaping Use: Never used   Substance and Sexual Activity    Alcohol use: Yes     Comment: social    Drug use: No    Sexual activity: Not Currently   Other Topics Concern    None   Social History Narrative    Uses safety equipment  Seat belt     Social Determinants of Health     Financial Resource Strain:     Difficulty of Paying Living Expenses:    Food Insecurity:     Worried About Running Out of Food in the Last Year:     Ran Out of Food in the Last Year:    Transportation Needs: No Transportation Needs    Lack of Transportation (Medical): No    Lack of Transportation (Non-Medical):  No   Physical Activity: Inactive    Days of Exercise per Week: 0 days    Minutes of Exercise per Session: 0 min   Stress:     Feeling of Stress :    Social Connections:     Frequency of Communication with Friends and Family:     Frequency of Social Gatherings with Friends and Family:     Attends [employer] Services:     Active Member of Clubs or Organizations:     Attends Club or Organization Meetings:     Marital Status:    Intimate Partner Violence:     Fear of Current or Ex-Partner:     Emotionally Abused:     Physically Abused:     Sexually Abused:        Current Outpatient Medications:     acetaminophen (TYLENOL) 325 mg tablet, Take 2 tablets (650 mg total) by mouth every 4 (four) hours as needed for mild pain (fever > 100 5), Disp: , Rfl: 0    albuterol (2 5 mg/3 mL) 0 083 % nebulizer solution, Inhale 1 each every 4 (four) hours as needed , Disp: , Rfl:     atorvastatin (LIPITOR) 10 mg tablet, TAKE 1 TABLET EVERY DAY, Disp: 90 tablet, Rfl: 1    buPROPion (WELLBUTRIN XL) 150 mg 24 hr tablet, TAKE 1 TABLET EVERY DAY, Disp: 90 tablet, Rfl: 1    Calcium Carbonate (CALTRATE 600) 1500 (600 Ca) MG TABS, Take 1 tablet by mouth 2 (two) times a day, Disp: , Rfl:     Cetirizine HCl (ZYRTEC ALLERGY PO), Take 1 tablet by mouth, Disp: , Rfl:     clindamycin (CLEOCIN) 150 mg capsule, TAKE FOUR CAPSULES BY MOUTH ONE HOUR BEFORE APPOINTMENT, Disp: , Rfl:     escitalopram (LEXAPRO) 20 mg tablet, TAKE 1 TABLET EVERY DAY, Disp: 90 tablet, Rfl: 1    esomeprazole (NexIUM) 40 MG capsule, Take 1 capsule (40 mg total) by mouth daily (Patient taking differently: Take 20 mg by mouth daily ), Disp: 30 capsule, Rfl: 3    fluticasone (FLONASE) 50 mcg/act nasal spray, into each nostril, Disp: , Rfl:     fluticasone-salmeterol (Advair Diskus) 100-50 mcg/dose inhaler, Inhale 1 puff every 12 (twelve) hours, Disp: 60 each, Rfl: 1    Ginger, Zingiber officinalis, (GINGER EXTRACT PO), Take by mouth, Disp: , Rfl:     losartan-hydrochlorothiazide (HYZAAR) 100-12 5 MG per tablet, TAKE 1 TABLET EVERY DAY, Disp: 90 tablet, Rfl: 1    methocarbamol (ROBAXIN) 500 mg tablet, Take 1 tablet (500 mg total) by mouth daily at bedtime as needed for muscle spasms, Disp: 30 tablet, Rfl: 0    predniSONE 20 mg tablet, Take 1 tablet (20 mg total) by mouth 2 (two) times a day with meals for 5 days, Disp: 10 tablet, Rfl: 0    Probiotic Product (PROBIOTIC DAILY PO), Take by mouth, Disp: , Rfl:     traMADol (ULTRAM) 50 mg tablet, Take 1 tablet (50 mg total) by mouth every 8 (eight) hours as needed for moderate pain or severe pain, Disp: 30 tablet, Rfl: 0    albuterol (VENTOLIN HFA) 90 mcg/act inhaler, Inhale 2 puffs every 4 (four) hours as needed  (Patient not taking: Reported on 7/23/2021), Disp: , Rfl:     docusate sodium (COLACE) 100 mg capsule, Take 1 capsule (100 mg total) by mouth 2 (two) times a day (Patient taking differently: Take 100 mg by mouth 2 (two) times a day as needed for constipation ), Disp: 10 capsule, Rfl: 0    Multiple Vitamins-Minerals (multivitamin with minerals) tablet, Take 1 tablet by mouth daily, Disp: 30 tablet, Rfl: 0    Allergies   Allergen Reactions    Clarithromycin GI Intolerance     Reaction Date: 12Apr2011;     Codeine Hives     Reaction Date: 12Apr2011;     Estradiol      Annotation - [id number]: ELEVATES BP    Hydrocodone Hives    Hydrocodone-Acetaminophen Rash    Meloxicam Edema     Feet and ankles swell    Tramadol Other (See Comments)     red,itchy         Review of Systems   Constitutional: Negative for chills, fever and unexpected weight change  HENT: Negative for hearing loss, nosebleeds and sore throat  Eyes: Negative for pain, redness and visual disturbance  Respiratory: Negative for cough, shortness of breath and wheezing  Cardiovascular: Negative for chest pain, palpitations and leg swelling  Gastrointestinal: Negative for abdominal pain, nausea and vomiting  Endocrine: Negative for polydipsia and polyuria  Genitourinary: Negative for dysuria and hematuria  Musculoskeletal: Positive for arthralgias and myalgias  Negative for back pain, gait problem, joint swelling, neck pain and neck stiffness  As noted in HPI   Skin: Negative for rash and wound  Neurological: Negative for dizziness, numbness and headaches  Psychiatric/Behavioral: Negative for decreased concentration and suicidal ideas  The patient is not nervous/anxious          Objective:  BP 161/75   Pulse 79   Ht 5' 5 5" (1 664 m)   Wt 80 7 kg (178 lb)   BMI 29 17 kg/m²     Ortho Exam      neck was soft and supple  There is a negative axial compression test in her neck  Left upper extremity was neurovascular intact  Fingers are pink and mobile  Compartments are soft  There is a painless arc of motion throughout her left shoulder with 165° of forward flexion, 165° of abduction, internal rotation to L4  There is no signs of impingement  No signs of instability  Rotator cuff strength testing was intact  There is a negative drop-arm test   Neurologically intact distally    Physical Exam  Constitutional:       Appearance: She is well-developed  HENT:      Right Ear: External ear normal       Left Ear: External ear normal       Nose: Nose normal    Eyes:      Conjunctiva/sclera: Conjunctivae normal       Pupils: Pupils are equal, round, and reactive to light  Pulmonary:      Effort: Pulmonary effort is normal    Musculoskeletal:         General: Normal range of motion  Cervical back: Normal range of motion  Skin:     General: Skin is warm and dry  Neurological:      Mental Status: She is alert and oriented to person, place, and time  Psychiatric:         Behavior: Behavior normal          Thought Content: Thought content normal          Judgment: Judgment normal        Imaging:  Attending Physician has personally reviewed pertinent imaging in PACS, impression is as follows:    Review of radiographic series taken  5/21/2021 of the  Left shoulder shows mild primary osteoarthritis of the glenohumeral joint with osteophyte formation in the inferior aspect of the humeral head, as well as mild arthritic changes in the [facility] joint     x-rays show a small inferior osteophyte along the humeral head    No other anomalies are noted      Scribe Attestation    I,:   am acting as a scribe while in the presence of the attending physician :       I,:   personally performed the services described in this documentation    as scribed in my presence :

## 2021-08-02 ENCOUNTER — DOCUMENTATION (OUTPATIENT)
Dept: OBGYN CLINIC | Facility: CLINIC | Age: 76
End: 2021-08-02

## 2021-08-02 NOTE — PROGRESS NOTES
Patient had an appointment last week with Dr Heather Stevens and lost her [address]  She stopped in the office today to see if we had it but there wasn't one turned in

## 2021-09-02 ENCOUNTER — APPOINTMENT (OUTPATIENT)
Dept: LAB | Facility: CLINIC | Age: 76
End: 2021-09-02
Payer: MEDICARE

## 2021-09-02 DIAGNOSIS — Z79.899 ENCOUNTER FOR LONG-TERM (CURRENT) USE OF OTHER MEDICATIONS: ICD-10-CM

## 2021-09-02 DIAGNOSIS — R53.83 FATIGUE, UNSPECIFIED TYPE: ICD-10-CM

## 2021-09-02 DIAGNOSIS — M25.511 RIGHT SHOULDER PAIN, UNSPECIFIED CHRONICITY: ICD-10-CM

## 2021-09-02 LAB
CK SERPL-CCNC: 91 U/L (ref 26–192)
CRP SERPL QL: 21.9 MG/L
ERYTHROCYTE [SEDIMENTATION RATE] IN BLOOD: 39 MM/HOUR (ref 0–29)
RHEUMATOID FACT SER QL LA: NEGATIVE
TSH SERPL DL<=0.05 MIU/L-ACNC: 2.34 UIU/ML (ref 0.36–3.74)

## 2021-09-02 PROCEDURE — 86140 C-REACTIVE PROTEIN: CPT

## 2021-09-02 PROCEDURE — 86430 RHEUMATOID FACTOR TEST QUAL: CPT

## 2021-09-02 PROCEDURE — 85652 RBC SED RATE AUTOMATED: CPT

## 2021-09-02 PROCEDURE — 36415 COLL VENOUS BLD VENIPUNCTURE: CPT

## 2021-09-02 PROCEDURE — 86200 CCP ANTIBODY: CPT

## 2021-09-02 PROCEDURE — 84443 ASSAY THYROID STIM HORMONE: CPT

## 2021-09-02 PROCEDURE — 82550 ASSAY OF CK (CPK): CPT

## 2021-09-03 LAB — CCP AB SER IA-ACNC: 0.9

## 2021-10-04 ENCOUNTER — OFFICE VISIT (OUTPATIENT)
Dept: OBGYN CLINIC | Facility: CLINIC | Age: 76
End: 2021-10-04
Payer: MEDICARE

## 2021-10-04 VITALS
HEART RATE: 64 BPM | DIASTOLIC BLOOD PRESSURE: 84 MMHG | SYSTOLIC BLOOD PRESSURE: 138 MMHG | BODY MASS INDEX: 27.8 KG/M2 | HEIGHT: 66 IN | WEIGHT: 173 LBS

## 2021-10-04 DIAGNOSIS — Z96.651 HX OF TOTAL KNEE REPLACEMENT, RIGHT: Primary | ICD-10-CM

## 2021-10-04 PROCEDURE — 99213 OFFICE O/P EST LOW 20 MIN: CPT | Performed by: ORTHOPAEDIC SURGERY

## 2021-10-06 DIAGNOSIS — I10 HYPERTENSION, UNSPECIFIED TYPE: ICD-10-CM

## 2021-10-06 RX ORDER — LOSARTAN POTASSIUM AND HYDROCHLOROTHIAZIDE 12.5; 1 MG/1; MG/1
TABLET ORAL
Qty: 90 TABLET | Refills: 1 | Status: SHIPPED | OUTPATIENT
Start: 2021-10-06 | End: 2022-03-21

## 2021-10-21 ENCOUNTER — APPOINTMENT (OUTPATIENT)
Dept: LAB | Facility: CLINIC | Age: 76
End: 2021-10-21
Payer: MEDICARE

## 2021-10-21 DIAGNOSIS — Z79.899 ENCOUNTER FOR LONG-TERM (CURRENT) USE OF OTHER MEDICATIONS: ICD-10-CM

## 2021-10-21 DIAGNOSIS — M35.3 POLYMYALGIA RHEUMATICA (HCC): ICD-10-CM

## 2021-10-21 LAB
CRP SERPL QL: <3 MG/L
ERYTHROCYTE [SEDIMENTATION RATE] IN BLOOD: 5 MM/HOUR (ref 0–29)

## 2021-10-21 PROCEDURE — 85652 RBC SED RATE AUTOMATED: CPT

## 2021-10-21 PROCEDURE — 86140 C-REACTIVE PROTEIN: CPT

## 2021-10-21 PROCEDURE — 36415 COLL VENOUS BLD VENIPUNCTURE: CPT

## 2021-10-23 DIAGNOSIS — F32.89 OTHER DEPRESSION: ICD-10-CM

## 2021-10-23 DIAGNOSIS — F41.8 DEPRESSION WITH ANXIETY: ICD-10-CM

## 2021-10-25 RX ORDER — ESCITALOPRAM OXALATE 20 MG/1
TABLET ORAL
Qty: 90 TABLET | Refills: 1 | Status: SHIPPED | OUTPATIENT
Start: 2021-10-25 | End: 2022-03-21

## 2021-10-25 RX ORDER — BUPROPION HYDROCHLORIDE 150 MG/1
TABLET ORAL
Qty: 90 TABLET | Refills: 1 | Status: SHIPPED | OUTPATIENT
Start: 2021-10-25 | End: 2022-03-21

## 2021-11-29 ENCOUNTER — RA CDI HCC (OUTPATIENT)
Dept: OTHER | Facility: HOSPITAL | Age: 76
End: 2021-11-29

## 2021-12-03 ENCOUNTER — OFFICE VISIT (OUTPATIENT)
Dept: FAMILY MEDICINE CLINIC | Facility: CLINIC | Age: 76
End: 2021-12-03
Payer: MEDICARE

## 2021-12-03 VITALS
TEMPERATURE: 96.8 F | DIASTOLIC BLOOD PRESSURE: 82 MMHG | HEIGHT: 67 IN | SYSTOLIC BLOOD PRESSURE: 148 MMHG | OXYGEN SATURATION: 98 % | BODY MASS INDEX: 29.35 KG/M2 | WEIGHT: 187 LBS | HEART RATE: 69 BPM

## 2021-12-03 DIAGNOSIS — N18.31 STAGE 3A CHRONIC KIDNEY DISEASE (HCC): ICD-10-CM

## 2021-12-03 DIAGNOSIS — J45.30 MILD PERSISTENT ASTHMA WITHOUT COMPLICATION: ICD-10-CM

## 2021-12-03 DIAGNOSIS — K21.9 GASTROESOPHAGEAL REFLUX DISEASE WITHOUT ESOPHAGITIS: Chronic | ICD-10-CM

## 2021-12-03 DIAGNOSIS — E78.2 MIXED HYPERLIPIDEMIA: ICD-10-CM

## 2021-12-03 DIAGNOSIS — Z00.00 MEDICARE ANNUAL WELLNESS VISIT, SUBSEQUENT: Primary | ICD-10-CM

## 2021-12-03 DIAGNOSIS — Z13.6 ENCOUNTER FOR ABDOMINAL AORTIC ANEURYSM (AAA) SCREENING: ICD-10-CM

## 2021-12-03 DIAGNOSIS — I10 BENIGN ESSENTIAL HYPERTENSION: Chronic | ICD-10-CM

## 2021-12-03 DIAGNOSIS — F41.8 DEPRESSION WITH ANXIETY: ICD-10-CM

## 2021-12-03 PROCEDURE — 99214 OFFICE O/P EST MOD 30 MIN: CPT | Performed by: PHYSICIAN ASSISTANT

## 2021-12-03 PROCEDURE — G0439 PPPS, SUBSEQ VISIT: HCPCS | Performed by: PHYSICIAN ASSISTANT

## 2021-12-03 RX ORDER — PREDNISONE 10 MG/1
TABLET ORAL
COMMUNITY
Start: 2021-10-08 | End: 2022-07-27

## 2022-01-06 DIAGNOSIS — E78.2 MIXED HYPERLIPIDEMIA: ICD-10-CM

## 2022-01-06 RX ORDER — ATORVASTATIN CALCIUM 10 MG/1
TABLET, FILM COATED ORAL
Qty: 90 TABLET | Refills: 1 | Status: SHIPPED | OUTPATIENT
Start: 2022-01-06 | End: 2022-06-02

## 2022-02-03 ENCOUNTER — APPOINTMENT (OUTPATIENT)
Dept: LAB | Facility: CLINIC | Age: 77
End: 2022-02-03
Payer: MEDICARE

## 2022-02-03 DIAGNOSIS — M35.3 POLYMYALGIA RHEUMATICA (HCC): ICD-10-CM

## 2022-02-03 DIAGNOSIS — Z79.899 LONG TERM USE OF DRUG: ICD-10-CM

## 2022-02-03 LAB — CRP SERPL QL: 12.7 MG/L

## 2022-02-03 PROCEDURE — 86140 C-REACTIVE PROTEIN: CPT

## 2022-03-02 DIAGNOSIS — J45.909 ASTHMA, UNSPECIFIED ASTHMA SEVERITY, UNSPECIFIED WHETHER COMPLICATED, UNSPECIFIED WHETHER PERSISTENT: ICD-10-CM

## 2022-03-19 DIAGNOSIS — I10 HYPERTENSION, UNSPECIFIED TYPE: ICD-10-CM

## 2022-03-21 RX ORDER — LOSARTAN POTASSIUM AND HYDROCHLOROTHIAZIDE 12.5; 1 MG/1; MG/1
TABLET ORAL
Qty: 90 TABLET | Refills: 1 | Status: SHIPPED | OUTPATIENT
Start: 2022-03-21

## 2022-05-04 ENCOUNTER — APPOINTMENT (OUTPATIENT)
Dept: LAB | Facility: CLINIC | Age: 77
End: 2022-05-04
Payer: MEDICARE

## 2022-05-04 DIAGNOSIS — E78.2 MIXED HYPERLIPIDEMIA: ICD-10-CM

## 2022-05-04 DIAGNOSIS — N18.31 STAGE 3A CHRONIC KIDNEY DISEASE (HCC): ICD-10-CM

## 2022-05-04 LAB
ALBUMIN SERPL BCP-MCNC: 3.8 G/DL (ref 3.5–5)
ALP SERPL-CCNC: 64 U/L (ref 46–116)
ALT SERPL W P-5'-P-CCNC: 31 U/L (ref 12–78)
ANION GAP SERPL CALCULATED.3IONS-SCNC: 4 MMOL/L (ref 4–13)
AST SERPL W P-5'-P-CCNC: 20 U/L (ref 5–45)
BASOPHILS # BLD AUTO: 0.02 THOUSANDS/ΜL (ref 0–0.1)
BASOPHILS NFR BLD AUTO: 0 % (ref 0–1)
BILIRUB SERPL-MCNC: 0.43 MG/DL (ref 0.2–1)
BUN SERPL-MCNC: 23 MG/DL (ref 5–25)
CALCIUM SERPL-MCNC: 9.9 MG/DL (ref 8.3–10.1)
CHLORIDE SERPL-SCNC: 105 MMOL/L (ref 100–108)
CHOLEST SERPL-MCNC: 166 MG/DL
CO2 SERPL-SCNC: 30 MMOL/L (ref 21–32)
CREAT SERPL-MCNC: 1.14 MG/DL (ref 0.6–1.3)
EOSINOPHIL # BLD AUTO: 0.17 THOUSAND/ΜL (ref 0–0.61)
EOSINOPHIL NFR BLD AUTO: 2 % (ref 0–6)
ERYTHROCYTE [DISTWIDTH] IN BLOOD BY AUTOMATED COUNT: 13.7 % (ref 11.6–15.1)
GFR SERPL CREATININE-BSD FRML MDRD: 47 ML/MIN/1.73SQ M
GLUCOSE P FAST SERPL-MCNC: 89 MG/DL (ref 65–99)
HCT VFR BLD AUTO: 35.4 % (ref 34.8–46.1)
HDLC SERPL-MCNC: 66 MG/DL
HGB BLD-MCNC: 11.3 G/DL (ref 11.5–15.4)
IMM GRANULOCYTES # BLD AUTO: 0.05 THOUSAND/UL (ref 0–0.2)
IMM GRANULOCYTES NFR BLD AUTO: 1 % (ref 0–2)
LDLC SERPL CALC-MCNC: 74 MG/DL (ref 0–100)
LYMPHOCYTES # BLD AUTO: 1.54 THOUSANDS/ΜL (ref 0.6–4.47)
LYMPHOCYTES NFR BLD AUTO: 19 % (ref 14–44)
MCH RBC QN AUTO: 29.8 PG (ref 26.8–34.3)
MCHC RBC AUTO-ENTMCNC: 31.9 G/DL (ref 31.4–37.4)
MCV RBC AUTO: 93 FL (ref 82–98)
MONOCYTES # BLD AUTO: 0.72 THOUSAND/ΜL (ref 0.17–1.22)
MONOCYTES NFR BLD AUTO: 9 % (ref 4–12)
NEUTROPHILS # BLD AUTO: 5.6 THOUSANDS/ΜL (ref 1.85–7.62)
NEUTS SEG NFR BLD AUTO: 69 % (ref 43–75)
NONHDLC SERPL-MCNC: 100 MG/DL
NRBC BLD AUTO-RTO: 0 /100 WBCS
PLATELET # BLD AUTO: 235 THOUSANDS/UL (ref 149–390)
PMV BLD AUTO: 11.2 FL (ref 8.9–12.7)
POTASSIUM SERPL-SCNC: 3.7 MMOL/L (ref 3.5–5.3)
PROT SERPL-MCNC: 6.8 G/DL (ref 6.4–8.2)
RBC # BLD AUTO: 3.79 MILLION/UL (ref 3.81–5.12)
SODIUM SERPL-SCNC: 139 MMOL/L (ref 136–145)
TRIGL SERPL-MCNC: 130 MG/DL
WBC # BLD AUTO: 8.1 THOUSAND/UL (ref 4.31–10.16)

## 2022-05-04 PROCEDURE — 80053 COMPREHEN METABOLIC PANEL: CPT

## 2022-05-04 PROCEDURE — 85025 COMPLETE CBC W/AUTO DIFF WBC: CPT

## 2022-05-04 PROCEDURE — 36415 COLL VENOUS BLD VENIPUNCTURE: CPT

## 2022-05-04 PROCEDURE — 80061 LIPID PANEL: CPT

## 2022-06-02 DIAGNOSIS — E78.2 MIXED HYPERLIPIDEMIA: ICD-10-CM

## 2022-06-02 RX ORDER — ATORVASTATIN CALCIUM 10 MG/1
TABLET, FILM COATED ORAL
Qty: 90 TABLET | Refills: 1 | Status: SHIPPED | OUTPATIENT
Start: 2022-06-02

## 2022-06-28 ENCOUNTER — APPOINTMENT (OUTPATIENT)
Dept: LAB | Facility: CLINIC | Age: 77
End: 2022-06-28
Payer: MEDICARE

## 2022-06-28 DIAGNOSIS — M35.3 POLYMYALGIA RHEUMATICA (HCC): ICD-10-CM

## 2022-06-28 DIAGNOSIS — Z79.899 ENCOUNTER FOR LONG-TERM (CURRENT) USE OF OTHER MEDICATIONS: ICD-10-CM

## 2022-06-28 LAB
CRP SERPL QL: 8.4 MG/L
ERYTHROCYTE [SEDIMENTATION RATE] IN BLOOD: 13 MM/HOUR (ref 0–29)

## 2022-06-28 PROCEDURE — 85652 RBC SED RATE AUTOMATED: CPT

## 2022-06-28 PROCEDURE — 36415 COLL VENOUS BLD VENIPUNCTURE: CPT

## 2022-06-28 PROCEDURE — 86140 C-REACTIVE PROTEIN: CPT

## 2022-07-21 ENCOUNTER — RA CDI HCC (OUTPATIENT)
Dept: OTHER | Facility: HOSPITAL | Age: 77
End: 2022-07-21

## 2022-07-22 RX ORDER — PREDNISONE 1 MG/1
10 TABLET ORAL DAILY
COMMUNITY
Start: 2022-06-01

## 2022-07-27 ENCOUNTER — OFFICE VISIT (OUTPATIENT)
Dept: FAMILY MEDICINE CLINIC | Facility: CLINIC | Age: 77
End: 2022-07-27

## 2022-07-27 VITALS
WEIGHT: 193.2 LBS | SYSTOLIC BLOOD PRESSURE: 158 MMHG | HEART RATE: 65 BPM | OXYGEN SATURATION: 98 % | HEIGHT: 67 IN | DIASTOLIC BLOOD PRESSURE: 88 MMHG | BODY MASS INDEX: 30.32 KG/M2 | TEMPERATURE: 97.5 F

## 2022-07-27 DIAGNOSIS — I10 HYPERTENSION, UNSPECIFIED TYPE: Primary | ICD-10-CM

## 2022-07-27 DIAGNOSIS — J45.30 MILD PERSISTENT ASTHMA WITHOUT COMPLICATION: ICD-10-CM

## 2022-07-27 DIAGNOSIS — F33.9 DEPRESSION, RECURRENT (HCC): ICD-10-CM

## 2022-07-27 DIAGNOSIS — I10 ELEVATED BLOOD PRESSURE READING IN OFFICE WITH DIAGNOSIS OF HYPERTENSION: ICD-10-CM

## 2022-07-27 DIAGNOSIS — M35.3 POLYMYALGIA RHEUMATICA (HCC): ICD-10-CM

## 2022-07-27 DIAGNOSIS — N18.31 STAGE 3A CHRONIC KIDNEY DISEASE (HCC): ICD-10-CM

## 2022-07-27 DIAGNOSIS — Z12.31 ENCOUNTER FOR SCREENING MAMMOGRAM FOR MALIGNANT NEOPLASM OF BREAST: ICD-10-CM

## 2022-07-27 DIAGNOSIS — E78.5 DYSLIPIDEMIA: ICD-10-CM

## 2022-07-27 NOTE — PROGRESS NOTES
Routine Follow-up    Alison Santos 78 y o  female   Date:  7/27/2022      Assessment and Plan:    Lance Khouyr was seen today for follow-up  Diagnoses and all orders for this visit:    Hypertension, unspecified type  Comments:  above goal due to decongestant use; stop sudafed use; continue meds without change    Depression, recurrent (HCC)  Comments:  stable on lexapro and wellbutrin, no changes    Stage 3a chronic kidney disease (HCC)  Comments:  stable on repeat labs, improve BP    Polymyalgia rheumatica (HCC)  Comments:  follows with Rheumatolgoy, on low dose steriods chronically     Mild persistent asthma without complication  Comments:  stable on preventative inhaler, minimal use of prn albuterol; can continue zyrtec and flonase, avoid decongestants    Elevated blood pressure reading in office with diagnosis of hypertension  Comments:  above goal due to decongestant use; stop sudafed use; continue meds without change    Encounter for screening mammogram for malignant neoplasm of breast  -     Mammo screening bilateral w 3d & cad; Future    Dyslipidemia  Comments:  stable on statin           HPI:  Chief Complaint   Patient presents with    Follow-up     6 month     HPI   Patient is a 79 yo female who presents for routine follow up  She completed labs last month, stable  Since weight gain with prednisone, she appreciates more wheezing with her asthma  She uses her advair daily  She had not needed albuterol though  She uses mucinex for mucus production  She relies on pseudofed a lot, took one today  She udnerstands this will affect BP and needs to stop use  She follows with Rheumatology, on chronic prednisone  She is still open to completing aaa screen, script reprinted  She is open to completing mammogram this year, understands can stop after 75  She feels stable current dose of [address] and wellbutrin  ROS: Review of Systems   Constitutional: Negative  Unexpected weight change: wt gain since prednisone  HENT: Positive for dental problem (hopeful pursuing dentures, 2 front teeth broke, hit by softball)  Respiratory: Negative  Cardiovascular: Negative  Musculoskeletal: Positive for arthralgias  Neurological: Negative  Psychiatric/Behavioral: Dysphoric mood: stable  Nervous/anxious: stable  Past Medical History:   Diagnosis Date    Arthritis     Asthma     Chronic kidney disease     CKD stage 3    Hyperlipidemia     Hypertension     Nasal polyps     Polymyalgia rheumatica (HCC)      Patient Active Problem List   Diagnosis    Age-related nuclear cataract, bilateral    Allergic rhinitis    Anemia    Benign essential hypertension    Central sleep apnea    Irritable bowel syndrome with diarrhea    CKD (chronic kidney disease), stage III (HCC)    Depression with anxiety    Dyslipidemia    Loss of height    Mild persistent asthma without complication    Nephrosclerosis    Osteoarthritis of left knee    Osteopenia    Overactive bladder    Primary insomnia    Vitamin B12 deficiency    Vitamin D deficiency    Chondromalacia patellae    Greater trochanteric pain syndrome    Gastroesophageal reflux disease without esophagitis    Primary osteoarthritis of right knee    Hx of total knee replacement, right    Depression, recurrent (HCC)    Polymyalgia rheumatica (HCC)       Past Surgical History:   Procedure Laterality Date    COLONOSCOPY      DILATION AND CURETTAGE OF UTERUS      HYSTEROSCOPY      of uterus  d&c 1997    HYSTEROSCOPY W/ POLYPECTOMY      2000 and 2001  resolved    NOSE SURGERY      nasal endoscopy polypectomy    NOSE SURGERY      OR ESOPHAGOGASTRODUODENOSCOPY TRANSORAL DIAGNOSTIC N/A 2/6/2019    Procedure: ESOPHAGOGASTRODUODENOSCOPY (EGD); Surgeon: Maite Escoto MD;  Location: [facility] GI LAB;   Service: Gastroenterology    OR TOTAL KNEE ARTHROPLASTY Right 10/7/2020    Procedure: KNEE TOTAL ARTHROPLASTY;  Surgeon: Amelia Khan DO;  Location: [facility] MAIN OR; Service: Orthopedics    TONSILLECTOMY      TOTAL KNEE ARTHROPLASTY Left        Social History     Socioeconomic History    Marital status: /Civil Union     Spouse name: None    Number of children: None    Years of education: None    Highest education level: None   Occupational History    None   Tobacco Use    Smoking status: Never Smoker    Smokeless tobacco: Never Used   Vaping Use    Vaping Use: Never used   Substance and Sexual Activity    Alcohol use: Yes     Comment: social    Drug use: No    Sexual activity: Not Currently   Other Topics Concern    None   Social History Narrative    Uses safety equipment  Seat belt     Social Determinants of Health     Financial Resource Strain: Not on file   Food Insecurity: Not on file   Transportation Needs: Not on file   Physical Activity: Not on file   Stress: Not on file   Social Connections: Not on file   Intimate Partner Violence: Not on file   Housing Stability: Not on file       Family History   Problem Relation Age of Onset    Heart attack Mother     Osteoporosis Mother     Uterine cancer Mother     Heart disease Mother     No Known Problems Father     No Known Problems Daughter     No Known Problems Maternal Grandmother     No Known Problems Maternal Aunt        Allergies   Allergen Reactions    Clarithromycin GI Intolerance     Reaction Date: 12Apr2011;     Codeine Hives     Reaction Date: 12Apr2011;     Estradiol      Annotation - [id number]: ELEVATES BP    Hydrocodone Hives    Hydrocodone-Acetaminophen Rash    Meloxicam Edema     Feet and ankles swell    Tramadol Other (See Comments)     red,itchy         Current Outpatient Medications:     acetaminophen (TYLENOL) 325 mg tablet, Take 2 tablets (650 mg total) by mouth every 4 (four) hours as needed for mild pain (fever > 100 5), Disp: , Rfl: 0    albuterol (2 5 mg/3 mL) 0 083 % nebulizer solution, Inhale 1 each every 4 (four) hours as needed , Disp: , Rfl:     albuterol (PROVENTIL HFA,VENTOLIN HFA) 90 mcg/act inhaler, Inhale 2 puffs every 4 (four) hours as needed, Disp: , Rfl:     atorvastatin (LIPITOR) 10 mg tablet, TAKE 1 TABLET EVERY DAY, Disp: 90 tablet, Rfl: 1    buPROPion (WELLBUTRIN XL) 150 mg 24 hr tablet, TAKE 1 TABLET EVERY DAY, Disp: 90 tablet, Rfl: 1    Calcium Carbonate 1500 (600 Ca) MG TABS, Take 1 tablet by mouth 2 (two) times a day, Disp: , Rfl:     Cetirizine HCl (ZYRTEC ALLERGY PO), Take 1 tablet by mouth, Disp: , Rfl:     clindamycin (CLEOCIN) 150 mg capsule, TAKE FOUR CAPSULES BY MOUTH ONE HOUR BEFORE APPOINTMENT, Disp: , Rfl:     escitalopram (LEXAPRO) 20 mg tablet, TAKE 1 TABLET EVERY DAY, Disp: 90 tablet, Rfl: 1    esomeprazole (NexIUM) 40 MG capsule, Take 1 capsule (40 mg total) by mouth daily (Patient taking differently: Take 20 mg by mouth daily), Disp: 30 capsule, Rfl: 3    fluticasone (FLONASE) 50 mcg/act nasal spray, into each nostril, Disp: , Rfl:     fluticasone-salmeterol (Advair Diskus) 100-50 mcg/dose inhaler, Inhale 1 puff every 12 (twelve) hours, Disp: 60 blister, Rfl: 1    Ginger, Zingiber officinalis, (GINGER EXTRACT PO), Take by mouth, Disp: , Rfl:     losartan-hydrochlorothiazide (HYZAAR) 100-12 5 MG per tablet, TAKE 1 TABLET EVERY DAY, Disp: 90 tablet, Rfl: 1    methocarbamol (ROBAXIN) 500 mg tablet, Take 1 tablet (500 mg total) by mouth daily at bedtime as needed for muscle spasms, Disp: 30 tablet, Rfl: 0    predniSONE 5 mg tablet, Take 10 mg by mouth daily, Disp: , Rfl:     docusate sodium (COLACE) 100 mg capsule, Take 1 capsule (100 mg total) by mouth 2 (two) times a day (Patient taking differently: Take 100 mg by mouth 2 (two) times a day as needed for constipation ), Disp: 10 capsule, Rfl: 0      Physical Exam:  /88 (BP Location: Left arm, Patient Position: Sitting, Cuff Size: Standard)   Pulse 65   Temp 97 5 °F (36 4 °C) (Tympanic)   Ht 5' 6 5" (1 689 m)   Wt 87 6 kg (193 lb 3 2 oz)   SpO2 98%   BMI 30 72 kg/m²     Physical Exam  Constitutional:       General: She is not in acute distress  Appearance: Normal appearance  HENT:      Head: Normocephalic and atraumatic  Right Ear: External ear normal       Left Ear: External ear normal       Mouth/Throat:      Mouth: Mucous membranes are moist    Eyes:      Conjunctiva/sclera: Conjunctivae normal       Pupils: Pupils are equal, round, and reactive to light  Cardiovascular:      Rate and Rhythm: Normal rate and regular rhythm  Heart sounds: No murmur heard  Pulmonary:      Effort: Pulmonary effort is normal  No respiratory distress  Breath sounds: Normal breath sounds  No wheezing  Musculoskeletal:         General: No deformity  Skin:     General: Skin is warm and dry  Coloration: Skin is not pale  Neurological:      General: No focal deficit present  Mental Status: She is alert and oriented to person, place, and time  Gait: Gait normal    Psychiatric:         Mood and Affect: Mood normal          Behavior: Behavior normal          Thought Content:  Thought content normal            Labs:  Lab Results   Component Value Date    WBC 8 10 05/04/2022    HGB 11 3 (L) 05/04/2022    HCT 35 4 05/04/2022    MCV 93 05/04/2022     05/04/2022     Lab Results   Component Value Date    K 3 7 05/04/2022     05/04/2022    CO2 30 05/04/2022    BUN 23 05/04/2022    CREATININE 1 14 05/04/2022    GLUF 89 05/04/2022    CALCIUM 9 9 05/04/2022    AST 20 05/04/2022    ALT 31 05/04/2022    ALKPHOS 64 05/04/2022    EGFR 47 05/04/2022

## 2022-08-15 DIAGNOSIS — F32.89 OTHER DEPRESSION: ICD-10-CM

## 2022-08-15 DIAGNOSIS — I10 HYPERTENSION, UNSPECIFIED TYPE: ICD-10-CM

## 2022-08-15 DIAGNOSIS — F41.8 DEPRESSION WITH ANXIETY: ICD-10-CM

## 2022-08-15 RX ORDER — LOSARTAN POTASSIUM AND HYDROCHLOROTHIAZIDE 12.5; 1 MG/1; MG/1
TABLET ORAL
Qty: 90 TABLET | Refills: 1 | Status: SHIPPED | OUTPATIENT
Start: 2022-08-15

## 2022-08-15 RX ORDER — BUPROPION HYDROCHLORIDE 150 MG/1
TABLET ORAL
Qty: 90 TABLET | Refills: 1 | Status: SHIPPED | OUTPATIENT
Start: 2022-08-15

## 2022-08-15 RX ORDER — ESCITALOPRAM OXALATE 20 MG/1
TABLET ORAL
Qty: 90 TABLET | Refills: 1 | Status: SHIPPED | OUTPATIENT
Start: 2022-08-15

## 2022-08-26 DIAGNOSIS — J45.909 ASTHMA, UNSPECIFIED ASTHMA SEVERITY, UNSPECIFIED WHETHER COMPLICATED, UNSPECIFIED WHETHER PERSISTENT: ICD-10-CM

## 2022-08-29 RX ORDER — FLUTICASONE PROPIONATE AND SALMETEROL 100; 50 UG/1; UG/1
POWDER RESPIRATORY (INHALATION)
Qty: 120 BLISTER | Refills: 1 | Status: SHIPPED | OUTPATIENT
Start: 2022-08-29

## 2022-10-04 ENCOUNTER — APPOINTMENT (OUTPATIENT)
Dept: LAB | Facility: CLINIC | Age: 77
End: 2022-10-04
Payer: MEDICARE

## 2022-10-04 DIAGNOSIS — Z79.899 LONG TERM USE OF DRUG: ICD-10-CM

## 2022-10-04 DIAGNOSIS — M35.3 POLYMYALGIA RHEUMATICA (HCC): ICD-10-CM

## 2022-10-04 LAB
CRP SERPL QL: 9.2 MG/L
ERYTHROCYTE [SEDIMENTATION RATE] IN BLOOD: 17 MM/HOUR (ref 0–29)

## 2022-10-04 PROCEDURE — 36415 COLL VENOUS BLD VENIPUNCTURE: CPT

## 2022-10-04 PROCEDURE — 86140 C-REACTIVE PROTEIN: CPT

## 2022-10-04 PROCEDURE — 85652 RBC SED RATE AUTOMATED: CPT

## 2022-11-15 ENCOUNTER — APPOINTMENT (OUTPATIENT)
Dept: LAB | Facility: CLINIC | Age: 77
End: 2022-11-15

## 2022-11-15 DIAGNOSIS — Z79.899 ENCOUNTER FOR LONG-TERM (CURRENT) USE OF OTHER MEDICATIONS: ICD-10-CM

## 2022-11-15 DIAGNOSIS — M35.3 POLYMYALGIA RHEUMATICA (HCC): ICD-10-CM

## 2022-11-15 LAB
ALBUMIN SERPL BCP-MCNC: 3.8 G/DL (ref 3.5–5)
ALP SERPL-CCNC: 88 U/L (ref 46–116)
ALT SERPL W P-5'-P-CCNC: 27 U/L (ref 12–78)
ANION GAP SERPL CALCULATED.3IONS-SCNC: 3 MMOL/L (ref 4–13)
AST SERPL W P-5'-P-CCNC: 18 U/L (ref 5–45)
BASOPHILS # BLD AUTO: 0.02 THOUSANDS/ÂΜL (ref 0–0.1)
BASOPHILS NFR BLD AUTO: 0 % (ref 0–1)
BILIRUB SERPL-MCNC: 0.5 MG/DL (ref 0.2–1)
BUN SERPL-MCNC: 19 MG/DL (ref 5–25)
CALCIUM SERPL-MCNC: 9.9 MG/DL (ref 8.3–10.1)
CHLORIDE SERPL-SCNC: 105 MMOL/L (ref 96–108)
CK SERPL-CCNC: 85 U/L (ref 26–192)
CO2 SERPL-SCNC: 32 MMOL/L (ref 21–32)
CREAT SERPL-MCNC: 1.12 MG/DL (ref 0.6–1.3)
CRP SERPL QL: 13.9 MG/L
EOSINOPHIL # BLD AUTO: 0.17 THOUSAND/ÂΜL (ref 0–0.61)
EOSINOPHIL NFR BLD AUTO: 2 % (ref 0–6)
ERYTHROCYTE [DISTWIDTH] IN BLOOD BY AUTOMATED COUNT: 13.2 % (ref 11.6–15.1)
ERYTHROCYTE [SEDIMENTATION RATE] IN BLOOD: 23 MM/HOUR (ref 0–29)
GFR SERPL CREATININE-BSD FRML MDRD: 48 ML/MIN/1.73SQ M
GLUCOSE P FAST SERPL-MCNC: 106 MG/DL (ref 65–99)
HCT VFR BLD AUTO: 35.7 % (ref 34.8–46.1)
HGB BLD-MCNC: 11.2 G/DL (ref 11.5–15.4)
IMM GRANULOCYTES # BLD AUTO: 0.06 THOUSAND/UL (ref 0–0.2)
IMM GRANULOCYTES NFR BLD AUTO: 1 % (ref 0–2)
LYMPHOCYTES # BLD AUTO: 0.74 THOUSANDS/ÂΜL (ref 0.6–4.47)
LYMPHOCYTES NFR BLD AUTO: 8 % (ref 14–44)
MCH RBC QN AUTO: 30.2 PG (ref 26.8–34.3)
MCHC RBC AUTO-ENTMCNC: 31.4 G/DL (ref 31.4–37.4)
MCV RBC AUTO: 96 FL (ref 82–98)
MONOCYTES # BLD AUTO: 0.54 THOUSAND/ÂΜL (ref 0.17–1.22)
MONOCYTES NFR BLD AUTO: 6 % (ref 4–12)
NEUTROPHILS # BLD AUTO: 7.79 THOUSANDS/ÂΜL (ref 1.85–7.62)
NEUTS SEG NFR BLD AUTO: 83 % (ref 43–75)
NRBC BLD AUTO-RTO: 0 /100 WBCS
PLATELET # BLD AUTO: 243 THOUSANDS/UL (ref 149–390)
PMV BLD AUTO: 11.3 FL (ref 8.9–12.7)
POTASSIUM SERPL-SCNC: 4.2 MMOL/L (ref 3.5–5.3)
PROT SERPL-MCNC: 7.2 G/DL (ref 6.4–8.4)
RBC # BLD AUTO: 3.71 MILLION/UL (ref 3.81–5.12)
SODIUM SERPL-SCNC: 140 MMOL/L (ref 135–147)
WBC # BLD AUTO: 9.32 THOUSAND/UL (ref 4.31–10.16)

## 2022-11-18 ENCOUNTER — HOSPITAL ENCOUNTER (OUTPATIENT)
Dept: MAMMOGRAPHY | Facility: HOSPITAL | Age: 77
End: 2022-11-18

## 2022-11-18 VITALS — WEIGHT: 193 LBS | BODY MASS INDEX: 30.29 KG/M2 | HEIGHT: 67 IN

## 2022-11-18 DIAGNOSIS — Z12.31 ENCOUNTER FOR SCREENING MAMMOGRAM FOR MALIGNANT NEOPLASM OF BREAST: ICD-10-CM

## 2022-11-19 ENCOUNTER — HOSPITAL ENCOUNTER (OUTPATIENT)
Dept: ULTRASOUND IMAGING | Facility: HOSPITAL | Age: 77
Discharge: HOME | End: 2022-11-19

## 2022-11-19 DIAGNOSIS — Z13.6 ENCOUNTER FOR ABDOMINAL AORTIC ANEURYSM (AAA) SCREENING: ICD-10-CM

## 2022-12-05 ENCOUNTER — OFFICE VISIT (OUTPATIENT)
Dept: FAMILY MEDICINE CLINIC | Facility: CLINIC | Age: 77
End: 2022-12-05

## 2022-12-05 VITALS
WEIGHT: 187 LBS | SYSTOLIC BLOOD PRESSURE: 158 MMHG | DIASTOLIC BLOOD PRESSURE: 84 MMHG | HEART RATE: 66 BPM | BODY MASS INDEX: 29.35 KG/M2 | HEIGHT: 67 IN | OXYGEN SATURATION: 97 % | TEMPERATURE: 97.8 F

## 2022-12-05 DIAGNOSIS — Z71.89 ADVANCED DIRECTIVES, COUNSELING/DISCUSSION: ICD-10-CM

## 2022-12-05 DIAGNOSIS — Z00.00 MEDICARE ANNUAL WELLNESS VISIT, SUBSEQUENT: Primary | ICD-10-CM

## 2022-12-05 NOTE — PATIENT INSTRUCTIONS
Medicare Preventive Visit Patient Instructions  Thank you for completing your Welcome to Medicare Visit or Medicare Annual Wellness Visit today  Your next wellness visit will be due in one year (12/6/2023)  The screening/preventive services that you may require over the next 5-10 years are detailed below  Some tests may not apply to you based off risk factors and/or age  Screening tests ordered at today's visit but not completed yet may show as past due  Also, please note that scanned in results may not display below  Preventive Screenings:  Service Recommendations Previous Testing/Comments   Colorectal Cancer Screening  * Colonoscopy    * Fecal Occult Blood Test (FOBT)/Fecal Immunochemical Test (FIT)  * Fecal DNA/Cologuard Test  * Flexible Sigmoidoscopy Age: 39-70 years old   Colonoscopy: every 10 years (may be performed more frequently if at higher risk)  OR  FOBT/FIT: every 1 year  OR  Cologuard: every 3 years  OR  Sigmoidoscopy: every 5 years  Screening may be recommended earlier than age 39 if at higher risk for colorectal cancer  Also, an individualized decision between you and your healthcare provider will decide whether screening between the ages of 74-80 would be appropriate  Colonoscopy: 04/28/2020  FOBT/FIT: Not on file  Cologuard: 04/28/2020  Sigmoidoscopy: Not on file          Breast Cancer Screening Age: 36 years old  Frequency: every 1-2 years  Not required if history of left and right mastectomy Mammogram: 11/18/2022    Screening Current   Cervical Cancer Screening Between the ages of 21-29, pap smear recommended once every 3 years  Between the ages of 33-67, can perform pap smear with HPV co-testing every 5 years     Recommendations may differ for women with a history of total hysterectomy, cervical cancer, or abnormal pap smears in past  Pap Smear: Not on file    Screening Not Indicated   Hepatitis C Screening Once for adults born between 1945 and 1965  More frequently in patients at high risk for Hepatitis C Hep C Antibody: 09/18/2019    Screening Current   Diabetes Screening 1-2 times per year if you're at risk for diabetes or have pre-diabetes Fasting glucose: 106 mg/dL (11/15/2022)  A1C: 5 4 % (9/8/2020)  Screening Current   Cholesterol Screening Once every 5 years if you don't have a lipid disorder  May order more often based on risk factors  Lipid panel: 05/04/2022    Screening Not Indicated  History Lipid Disorder     Other Preventive Screenings Covered by Medicare:  1  Abdominal Aortic Aneurysm (AAA) Screening: covered once if your at risk  You're considered to be at risk if you have a family history of AAA  2  Lung Cancer Screening: covers low dose CT scan once per year if you meet all of the following conditions: (1) Age 50-69; (2) No signs or symptoms of lung cancer; (3) Current smoker or have quit smoking within the last 15 years; (4) You have a tobacco smoking history of at least 20 pack years (packs per day multiplied by number of years you smoked); (5) You get a written order from a healthcare provider  3  Glaucoma Screening: covered annually if you're considered high risk: (1) You have diabetes OR (2) Family history of glaucoma OR (3)  aged 48 and older OR (3)  American aged 72 and older  3  Osteoporosis Screening: covered every 2 years if you meet one of the following conditions: (1) You're estrogen deficient and at risk for osteoporosis based off medical history and other findings; (2) Have a vertebral abnormality; (3) On glucocorticoid therapy for more than 3 months; (4) Have primary hyperparathyroidism; (5) On osteoporosis medications and need to assess response to drug therapy  · Last bone density test (DXA Scan): 05/27/2021   5  HIV Screening: covered annually if you're between the age of 15-65  Also covered annually if you are younger than 13 and older than 72 with risk factors for HIV infection   For pregnant patients, it is covered up to 3 times per pregnancy  Immunizations:  Immunization Recommendations   Influenza Vaccine Annual influenza vaccination during flu season is recommended for all persons aged >= 6 months who do not have contraindications   Pneumococcal Vaccine   * Pneumococcal conjugate vaccine = PCV13 (Prevnar 13), PCV15 (Vaxneuvance), PCV20 (Prevnar 20)  * Pneumococcal polysaccharide vaccine = PPSV23 (Pneumovax) Adults 25-60 years old: 1-3 doses may be recommended based on certain risk factors  Adults 72 years old: 1-2 doses may be recommended based off what pneumonia vaccine you previously received   Hepatitis B Vaccine 3 dose series if at intermediate or high risk (ex: diabetes, end stage renal disease, liver disease)   Tetanus (Td) Vaccine - COST NOT COVERED BY MEDICARE PART B Following completion of primary series, a booster dose should be given every 10 years to maintain immunity against tetanus  Td may also be given as tetanus wound prophylaxis  Tdap Vaccine - COST NOT COVERED BY MEDICARE PART B Recommended at least once for all adults  For pregnant patients, recommended with each pregnancy  Shingles Vaccine (Shingrix) - COST NOT COVERED BY MEDICARE PART B  2 shot series recommended in those aged 48 and above     Health Maintenance Due:      Topic Date Due   • Colorectal Cancer Screening  04/28/2023   • Breast Cancer Screening: Mammogram  11/18/2024   • Hepatitis C Screening  Completed     Immunizations Due:      Topic Date Due   • Hepatitis B Vaccine (1 of 3 - 3-dose series) Never done   • COVID-19 Vaccine (3 - Booster for Clydene Derby series) 09/15/2021     Advance Directives   What are advance directives? Advance directives are legal documents that state your wishes and plans for medical care  These plans are made ahead of time in case you lose your ability to make decisions for yourself  Advance directives can apply to any medical decision, such as the treatments you want, and if you want to donate organs     What are the types of advance directives? There are many types of advance directives, and each state has rules about how to use them  You may choose a combination of any of the following:  · Living will: This is a written record of the treatment you want  You can also choose which treatments you do not want, which to limit, and which to stop at a certain time  This includes surgery, medicine, IV fluid, and tube feedings  · Durable power of  for healthcare [facility]): This is a written record that states who you want to make healthcare choices for you when you are unable to make them for yourself  This person, called a proxy, is usually a family member or a friend  You may choose more than 1 proxy  · Do not resuscitate (DNR) order:  A DNR order is used in case your heart stops beating or you stop breathing  It is a request not to have certain forms of treatment, such as CPR  A DNR order may be included in other types of advance directives  · Medical directive: This covers the care that you want if you are in a coma, near death, or unable to make decisions for yourself  You can list the treatments you want for each condition  Treatment may include pain medicine, surgery, blood transfusions, dialysis, IV or tube feedings, and a ventilator (breathing machine)  · Values history: This document has questions about your views, beliefs, and how you feel and think about life  This information can help others choose the care that you would choose  Why are advance directives important? An advance directive helps you control your care  Although spoken wishes may be used, it is better to have your wishes written down  Spoken wishes can be misunderstood, or not followed  Treatments may be given even if you do not want them  An advance directive may make it easier for your family to make difficult choices about your care  Urinary Incontinence   Urinary incontinence (UI)  is when you lose control of your bladder   UI develops because your bladder cannot store or empty urine properly  The 3 most common types of UI are stress incontinence, urge incontinence, or both  Medicines:   · May be given to help strengthen your bladder control  Report any side effects of medication to your healthcare provider  Do pelvic muscle exercises often:  Your pelvic muscles help you stop urinating  Squeeze these muscles tight for 5 seconds, then relax for 5 seconds  Gradually work up to squeezing for 10 seconds  Do 3 sets of 15 repetitions a day, or as directed  This will help strengthen your pelvic muscles and improve bladder control  Train your bladder:  Go to the bathroom at set times, such as every 2 hours, even if you do not feel the urge to go  You can also try to hold your urine when you feel the urge to go  For example, hold your urine for 5 minutes when you feel the urge to go  As that becomes easier, hold your urine for 10 minutes  Self-care:   · Keep a UI record  Write down how often you leak urine and how much you leak  Make a note of what you were doing when you leaked urine  · Drink liquids as directed  You may need to limit the amount of liquid you drink to help control your urine leakage  Do not drink any liquid right before you go to bed  Limit or do not have drinks that contain caffeine or alcohol  · Prevent constipation  Eat a variety of high-fiber foods  Good examples are high-fiber cereals, beans, vegetables, and whole-grain breads  Walking is the best way to trigger your intestines to have a bowel movement  · Exercise regularly and maintain a healthy weight  Weight loss and exercise will decrease pressure on your bladder and help you control your leakage  · Use a catheter as directed  to help empty your bladder  A catheter is a tiny, plastic tube that is put into your bladder to drain your urine  · Go to behavior therapy as directed  Behavior therapy may be used to help you learn to control your urge to urinate      Weight Management   Why it is important to manage your weight:  Being overweight increases your risk of health conditions such as heart disease, high blood pressure, type 2 diabetes, and certain types of cancer  It can also increase your risk for osteoarthritis, sleep apnea, and other respiratory problems  Aim for a slow, steady weight loss  Even a small amount of weight loss can lower your risk of health problems  How to lose weight safely:  A safe and healthy way to lose weight is to eat fewer calories and get regular exercise  You can lose up about 1 pound a week by decreasing the number of calories you eat by 500 calories each day  Healthy meal plan for weight management:  A healthy meal plan includes a variety of foods, contains fewer calories, and helps you stay healthy  A healthy meal plan includes the following:  · Eat whole-grain foods more often  A healthy meal plan should contain fiber  Fiber is the part of grains, fruits, and vegetables that is not broken down by your body  Whole-grain foods are healthy and provide extra fiber in your diet  Some examples of whole-grain foods are whole-wheat breads and pastas, oatmeal, brown rice, and bulgur  · Eat a variety of vegetables every day  Include dark, leafy greens such as spinach, kale, heladio greens, and mustard greens  Eat yellow and orange vegetables such as carrots, sweet potatoes, and winter squash  · Eat a variety of fruits every day  Choose fresh or canned fruit (canned in its own juice or light syrup) instead of juice  Fruit juice has very little or no fiber  · Eat low-fat dairy foods  Drink fat-free (skim) milk or 1% milk  Eat fat-free yogurt and low-fat cottage cheese  Try low-fat cheeses such as mozzarella and other reduced-fat cheeses  · Choose meat and other protein foods that are low in fat  Choose beans or other legumes such as split peas or lentils   Choose fish, skinless poultry (chicken or turkey), or lean cuts of red meat (beef or pork)  Before you cook meat or poultry, cut off any visible fat  · Use less fat and oil  Try baking foods instead of frying them  Add less fat, such as margarine, sour cream, regular salad dressing and mayonnaise to foods  Eat fewer high-fat foods  Some examples of high-fat foods include french fries, doughnuts, ice cream, and cakes  · Eat fewer sweets  Limit foods and drinks that are high in sugar  This includes candy, cookies, regular soda, and sweetened drinks  Exercise:  Exercise at least 30 minutes per day on most days of the week  Some examples of exercise include walking, biking, dancing, and swimming  You can also fit in more physical activity by taking the stairs instead of the elevator or parking farther away from stores  Ask your healthcare provider about the best exercise plan for you  © Copyright "[employer] 2018 Information is for End User's use only and may not be sold, redistributed or otherwise used for commercial purposes   All illustrations and images included in CareNotes® are the copyrighted property of [employer]  or [address] [place] [address]

## 2022-12-05 NOTE — PROGRESS NOTES
Assessment and Plan:     Problem List Items Addressed This Visit    None  Visit Diagnoses     Medicare annual wellness visit, subsequent    -  Primary    Advanced directives, counseling/discussion            BMI Counseling: Body mass index is 29 73 kg/m²  Follow-up plan was not completed due to elderly patient (74 years old) where weight reduction/weight gain would complicate underlying health condition such as: nutritional deficiency  Preventive health issues were discussed with patient, and age appropriate screening tests were ordered as noted in patient's After Visit Summary  Personalized health advice and appropriate referrals for health education or preventive services given if needed, as noted in patient's After Visit Summary  History of Present Illness:     Patient presents for a Medicare Wellness Visit  No concerns or questions       HPI   Patient Care Team:  Benji Medrano PA-C as PCP - General (Family Medicine)  MD Alexander Manzano DO Arliss Regan, DO     Review of Systems:     Review of Systems     Problem List:     Patient Active Problem List   Diagnosis   • Age-related nuclear cataract, bilateral   • Allergic rhinitis   • Anemia   • Benign essential hypertension   • Central sleep apnea   • Irritable bowel syndrome with diarrhea   • CKD (chronic kidney disease), stage III ([facility] [address] )   • Depression with anxiety   • Dyslipidemia   • Loss of height   • Mild persistent asthma without complication   • Nephrosclerosis   • Osteoarthritis of left knee   • Osteopenia   • Overactive bladder   • Primary insomnia   • Vitamin B12 deficiency   • Vitamin D deficiency   • Chondromalacia patellae   • Greater trochanteric pain syndrome   • Gastroesophageal reflux disease without esophagitis   • Primary osteoarthritis of right knee   • Hx of total knee replacement, right   • Depression, recurrent (HCC)   • Polymyalgia rheumatica ([facility] [address] )      Past Medical and Surgical History:     Past Medical History:   Diagnosis Date   • Arthritis    • Asthma    • Chronic kidney disease     CKD stage 3   • Hyperlipidemia    • Hypertension    • Nasal polyps    • Polymyalgia rheumatica ([facility] [address] )      Past Surgical History:   Procedure Laterality Date   • COLONOSCOPY     • DILATION AND CURETTAGE OF UTERUS     • HYSTEROSCOPY      of uterus  d&c 1997   • HYSTEROSCOPY W/ POLYPECTOMY      2000 and 2001  resolved   • NOSE SURGERY      nasal endoscopy polypectomy   • NOSE SURGERY     • VT ESOPHAGOGASTRODUODENOSCOPY TRANSORAL DIAGNOSTIC N/A 2/6/2019    Procedure: ESOPHAGOGASTRODUODENOSCOPY (EGD); Surgeon: Doris Haynes MD;  Location: [facility] GI LAB;   Service: Gastroenterology   • VT TOTAL KNEE ARTHROPLASTY Right 10/7/2020    Procedure: KNEE TOTAL ARTHROPLASTY;  Surgeon: Dawit Barragan DO;  Location: [facility] MAIN OR;  Service: Orthopedics   • TONSILLECTOMY     • TOTAL KNEE ARTHROPLASTY Left       Family History:     Family History   Problem Relation Age of Onset   • Heart attack Mother    • Osteoporosis Mother    • Uterine cancer Mother    • Heart disease Mother    • No Known Problems Father    • No Known Problems Daughter    • No Known Problems Maternal Grandmother    • No Known Problems Maternal Aunt       Social History:     Social History     Socioeconomic History   • Marital status: /Civil Union     Spouse name: None   • Number of children: None   • Years of education: None   • Highest education level: None   Occupational History   • None   Tobacco Use   • Smoking status: Never   • Smokeless tobacco: Never   Vaping Use   • Vaping Use: Never used   Substance and Sexual Activity   • Alcohol use: Yes     Comment: social   • Drug use: No   • Sexual activity: Not Currently   Other Topics Concern   • None   Social History Narrative    Uses safety equipment  Seat belt     Social Determinants of Health     Financial Resource Strain: Low Risk    • Difficulty of Paying Living Expenses: Not hard at all   Food Insecurity: Not on file   Transportation Needs: No Transportation Needs   • Lack of Transportation (Medical): No   • Lack of Transportation (Non-Medical):  No   Physical Activity: Not on file   Stress: Not on file   Social Connections: Not on file   Intimate Partner Violence: Not on file   Housing Stability: Not on file      Medications and Allergies:     Current Outpatient Medications   Medication Sig Dispense Refill   • acetaminophen (TYLENOL) 325 mg tablet Take 2 tablets (650 mg total) by mouth every 4 (four) hours as needed for mild pain (fever > 100 5)  0   • albuterol (PROVENTIL HFA,VENTOLIN HFA) 90 mcg/act inhaler Inhale 2 puffs every 4 (four) hours as needed     • atorvastatin (LIPITOR) 10 mg tablet TAKE 1 TABLET EVERY DAY 90 tablet 1   • buPROPion (WELLBUTRIN XL) 150 mg 24 hr tablet TAKE 1 TABLET EVERY DAY 90 tablet 1   • Calcium Carbonate 1500 (600 Ca) MG TABS Take 1 tablet by mouth 2 (two) times a day     • Cetirizine HCl (ZYRTEC ALLERGY PO) Take 1 tablet by mouth     • clindamycin (CLEOCIN) 150 mg capsule TAKE FOUR CAPSULES BY MOUTH ONE HOUR BEFORE APPOINTMENT     • escitalopram (LEXAPRO) 20 mg tablet TAKE 1 TABLET EVERY DAY 90 tablet 1   • esomeprazole (NexIUM) 40 MG capsule Take 1 capsule (40 mg total) by mouth daily (Patient taking differently: Take 20 mg by mouth daily) 30 capsule 3   • fluticasone (FLONASE) 50 mcg/act nasal spray into each nostril     • Fluticasone-Salmeterol (Advair) 100-50 mcg/dose inhaler INHALE 1 PUFF EVERY 12 (TWELVE) HOURS 120 blister 1   • Ginger, Zingiber officinalis, (GINGER EXTRACT PO) Take by mouth     • losartan-hydrochlorothiazide (HYZAAR) 100-12 5 MG per tablet TAKE 1 TABLET EVERY DAY 90 tablet 1   • methocarbamol (ROBAXIN) 500 mg tablet Take 1 tablet (500 mg total) by mouth daily at bedtime as needed for muscle spasms 30 tablet 0   • predniSONE 5 mg tablet Take 10 mg by mouth daily     • docusate sodium (COLACE) 100 mg capsule Take 1 capsule (100 mg total) by mouth 2 (two) times a day (Patient taking differently: Take 100 mg by mouth 2 (two) times a day as needed for constipation ) 10 capsule 0     No current facility-administered medications for this visit  Allergies   Allergen Reactions   • Clarithromycin GI Intolerance     Reaction Date: 12Apr2011;    • Codeine Hives     Reaction Date: 12Apr2011;    • Estradiol      Annotation - [id number]: ELEVATES BP   • Hydrocodone Hives   • Hydrocodone-Acetaminophen Rash   • Meloxicam Edema     Feet and ankles swell   • Tramadol Other (See Comments)     red,itchy      Immunizations:     Immunization History   Administered Date(s) Administered   • COVID-19 MODERNA VACC 0 5 ML IM 03/16/2021, 04/15/2021   • INFLUENZA 10/02/2013, 10/30/2014, 09/18/2015, 11/14/2016, 10/02/2017, 10/11/2017, 10/03/2018, 10/20/2021, 10/06/2022   • Influenza Split High Dose Preservative Free IM 10/24/2012, 09/30/2016   • Influenza, high dose seasonal 0 7 mL 09/28/2018, 10/01/2019, 09/18/2020   • Influenza, seasonal, injectable 11/06/2009, 10/30/2014, 10/01/2015   • Pneumococcal Conjugate 13-Valent 09/15/2015   • Pneumococcal Polysaccharide PPV23 11/12/2020   • Zoster 03/03/2014, 09/23/2014      Health Maintenance:         Topic Date Due   • Colorectal Cancer Screening  04/28/2023   • Breast Cancer Screening: Mammogram  11/18/2024   • Hepatitis C Screening  Completed         Topic Date Due   • Hepatitis B Vaccine (1 of 3 - 3-dose series) Never done   • COVID-19 Vaccine (3 - Booster for Jeff Naegeli series) 09/15/2021      Medicare Screening Tests and Risk Assessments:     Aga Hobbs is here for her Subsequent Wellness visit  Last Medicare Wellness visit information reviewed, patient interviewed and updates made to the record today  Health Risk Assessment:   Patient rates overall health as very good  Patient feels that their physical health rating is same  Patient is satisfied with their life  Eyesight was rated as slightly worse  Hearing was rated as same   Patient feels that their emotional and mental health rating is same  Patients states they are never, rarely angry  Patient states they are often unusually tired/fatigued  Pain experienced in the last 7 days has been none  Patient states that she has experienced no weight loss or gain in last 6 months  Fall Risk Screening: In the past year, patient has experienced: no history of falling in past year      Urinary Incontinence Screening:   Patient has not leaked urine accidently in the last six months  Home Safety:  Patient does not have trouble with stairs inside or outside of their home  Patient has working smoke alarms and has no working carbon monoxide detector  Home safety hazards include: none  Nutrition:   Current diet is Low Saturated Fat, Low Cholesterol and Regular  Medications:   Patient is currently taking over-the-counter supplements  OTC medications include: see medication list  Patient is able to manage medications  Activities of Daily Living (ADLs)/Instrumental Activities of Daily Living (IADLs):   Walk and transfer into and out of bed and chair?: Yes  Dress and groom yourself?: Yes    Bathe or shower yourself?: Yes    Feed yourself? Yes  Do your laundry/housekeeping?: Yes  Manage your money, pay your bills and track your expenses?: Yes  Make your own meals?: Yes    Do your own shopping?: Yes    Previous Hospitalizations:   Any hospitalizations or ED visits within the last 12 months?: No      Advance Care Planning:   Living will: Yes    Durable POA for healthcare:  Yes    Advanced directive: Yes    Advanced directive counseling given: Yes    End of Life Decisions reviewed with patient: Yes    Provider agrees with end of life decisions: Yes      Cognitive Screening:   Provider or family/friend/caregiver concerned regarding cognition?: No    PREVENTIVE SCREENINGS      Cardiovascular Screening:    General: Screening Not Indicated and History Lipid Disorder      Diabetes Screening:     General: Screening Current      Colorectal Cancer Screening:     General: Screening Current      Breast Cancer Screening:     General: Screening Current      Cervical Cancer Screening:    General: Screening Not Indicated      Osteoporosis Screening:    General: Screening Current      Abdominal Aortic Aneurysm (AAA) Screening:        General: Patient Declines      Lung Cancer Screening:     General: Screening Not Indicated      Hepatitis C Screening:    General: Screening Current    Screening, Brief Intervention, and Referral to Treatment (SBIRT)    Screening  Typical number of drinks in a day: 0  Typical number of drinks in a week: 0  Interpretation: Low risk drinking behavior  Single Item Drug Screening:  How often have you used an illegal drug (including marijuana) or a prescription medication for non-medical reasons in the past year? never    Single Item Drug Screen Score: 0  Interpretation: Negative screen for possible drug use disorder    Brief Intervention  Alcohol & drug use screenings were reviewed  No concerns regarding substance use disorder identified  Other Counseling Topics:   Car/seat belt/driving safety, skin self-exam and sunscreen  No results found       Physical Exam:     /84 (BP Location: Left arm, Patient Position: Sitting, Cuff Size: Large)   Pulse 66   Temp 97 8 °F (36 6 °C) (Tympanic)   Ht 5' 6 5" (1 689 m)   Wt 84 8 kg (187 lb)   SpO2 97%   BMI 29 73 kg/m²     Physical Exam     Petr Tolentino MD

## 2023-01-26 ENCOUNTER — APPOINTMENT (OUTPATIENT)
Dept: LAB | Facility: CLINIC | Age: 78
End: 2023-01-26

## 2023-01-26 DIAGNOSIS — Z79.899 ENCOUNTER FOR LONG-TERM (CURRENT) USE OF OTHER MEDICATIONS: ICD-10-CM

## 2023-01-26 DIAGNOSIS — M35.3 POLYMYALGIA RHEUMATICA (HCC): ICD-10-CM

## 2023-01-26 LAB — CRP SERPL QL: 8.2 MG/L

## 2023-04-04 DIAGNOSIS — E78.2 MIXED HYPERLIPIDEMIA: ICD-10-CM

## 2023-04-04 RX ORDER — ATORVASTATIN CALCIUM 10 MG/1
10 TABLET, FILM COATED ORAL DAILY
Qty: 90 TABLET | Refills: 1 | Status: SHIPPED | OUTPATIENT
Start: 2023-04-04

## 2023-04-26 ENCOUNTER — APPOINTMENT (OUTPATIENT)
Dept: LAB | Facility: CLINIC | Age: 78
End: 2023-04-26

## 2023-04-26 DIAGNOSIS — M35.3 POLYMYALGIA RHEUMATICA (HCC): ICD-10-CM

## 2023-04-26 LAB
ALBUMIN SERPL BCP-MCNC: 3.9 G/DL (ref 3.5–5)
ALP SERPL-CCNC: 70 U/L (ref 46–116)
ALT SERPL W P-5'-P-CCNC: 27 U/L (ref 12–78)
ANION GAP SERPL CALCULATED.3IONS-SCNC: 5 MMOL/L (ref 4–13)
AST SERPL W P-5'-P-CCNC: 17 U/L (ref 5–45)
BASOPHILS # BLD AUTO: 0.01 THOUSANDS/ΜL (ref 0–0.1)
BASOPHILS NFR BLD AUTO: 0 % (ref 0–1)
BILIRUB SERPL-MCNC: 0.34 MG/DL (ref 0.2–1)
BUN SERPL-MCNC: 14 MG/DL (ref 5–25)
CALCIUM SERPL-MCNC: 9.7 MG/DL (ref 8.3–10.1)
CHLORIDE SERPL-SCNC: 103 MMOL/L (ref 96–108)
CO2 SERPL-SCNC: 31 MMOL/L (ref 21–32)
CREAT SERPL-MCNC: 1.16 MG/DL (ref 0.6–1.3)
CRP SERPL QL: 7 MG/L
EOSINOPHIL # BLD AUTO: 0.19 THOUSAND/ΜL (ref 0–0.61)
EOSINOPHIL NFR BLD AUTO: 4 % (ref 0–6)
ERYTHROCYTE [DISTWIDTH] IN BLOOD BY AUTOMATED COUNT: 13 % (ref 11.6–15.1)
ERYTHROCYTE [SEDIMENTATION RATE] IN BLOOD: 21 MM/HOUR (ref 0–29)
GFR SERPL CREATININE-BSD FRML MDRD: 45 ML/MIN/1.73SQ M
GLUCOSE P FAST SERPL-MCNC: 84 MG/DL (ref 65–99)
HCT VFR BLD AUTO: 36.6 % (ref 34.8–46.1)
HGB BLD-MCNC: 11.9 G/DL (ref 11.5–15.4)
IMM GRANULOCYTES # BLD AUTO: 0.02 THOUSAND/UL (ref 0–0.2)
IMM GRANULOCYTES NFR BLD AUTO: 0 % (ref 0–2)
LYMPHOCYTES # BLD AUTO: 1.01 THOUSANDS/ΜL (ref 0.6–4.47)
LYMPHOCYTES NFR BLD AUTO: 19 % (ref 14–44)
MCH RBC QN AUTO: 30.6 PG (ref 26.8–34.3)
MCHC RBC AUTO-ENTMCNC: 32.5 G/DL (ref 31.4–37.4)
MCV RBC AUTO: 94 FL (ref 82–98)
MONOCYTES # BLD AUTO: 0.43 THOUSAND/ΜL (ref 0.17–1.22)
MONOCYTES NFR BLD AUTO: 8 % (ref 4–12)
NEUTROPHILS # BLD AUTO: 3.59 THOUSANDS/ΜL (ref 1.85–7.62)
NEUTS SEG NFR BLD AUTO: 69 % (ref 43–75)
NRBC BLD AUTO-RTO: 0 /100 WBCS
PLATELET # BLD AUTO: 234 THOUSANDS/UL (ref 149–390)
PMV BLD AUTO: 11.7 FL (ref 8.9–12.7)
POTASSIUM SERPL-SCNC: 3.4 MMOL/L (ref 3.5–5.3)
PROT SERPL-MCNC: 7 G/DL (ref 6.4–8.4)
RBC # BLD AUTO: 3.89 MILLION/UL (ref 3.81–5.12)
SODIUM SERPL-SCNC: 139 MMOL/L (ref 135–147)
WBC # BLD AUTO: 5.25 THOUSAND/UL (ref 4.31–10.16)

## 2023-05-10 NOTE — ANESTHESIA PREPROCEDURE EVALUATION
Review of Systems/Medical History  Patient summary reviewed  Chart reviewed  No history of anesthetic complications     Cardiovascular  Hyperlipidemia, Hypertension ,   Comment: Echo reviewed, normal,  Pulmonary  Asthma , well controlled/ stable Asthma type of rescue: daily inhaler,        GI/Hepatic    GERD ,        Chronic kidney disease stage 3,   Comment: Hx of kidney disease, Renal function tests WNL     Endo/Other  Negative endo/other ROS      GYN       Hematology   Musculoskeletal    Arthritis     Neurology  Negative neurology ROS      Psychology   Anxiety,              Physical Exam    Airway    Mallampati score: III  TM Distance: >3 FB  Neck ROM: full     Dental   No notable dental hx     Cardiovascular  Rhythm: regular, Rate: normal, Cardiovascular exam normal    Pulmonary  Pulmonary exam normal Breath sounds clear to auscultation,     Other Findings        Anesthesia Plan  ASA Score- 3     Anesthesia Type- IV sedation with anesthesia with ASA Monitors  Additional Monitors:   Airway Plan:         Plan Factors-    Induction- intravenous  Postoperative Plan-     Informed Consent- Anesthetic plan and risks discussed with patient  [employer] Pregnancy And Lactation Text: This medication is Pregnancy Category B and is considered safe. There is some data to suggest avoiding during the third trimester. It is unknown if this medication is excreted in breast milk.

## 2023-05-31 ENCOUNTER — RA CDI HCC (OUTPATIENT)
Dept: OTHER | Facility: HOSPITAL | Age: 78
End: 2023-05-31

## 2023-06-16 ENCOUNTER — OFFICE VISIT (OUTPATIENT)
Dept: FAMILY MEDICINE CLINIC | Facility: CLINIC | Age: 78
End: 2023-06-16
Payer: MEDICARE

## 2023-06-16 VITALS
HEART RATE: 68 BPM | HEIGHT: 67 IN | TEMPERATURE: 97.2 F | DIASTOLIC BLOOD PRESSURE: 80 MMHG | BODY MASS INDEX: 28.72 KG/M2 | OXYGEN SATURATION: 99 % | SYSTOLIC BLOOD PRESSURE: 120 MMHG | WEIGHT: 183 LBS

## 2023-06-16 DIAGNOSIS — I10 BENIGN ESSENTIAL HYPERTENSION: Chronic | ICD-10-CM

## 2023-06-16 DIAGNOSIS — F41.8 DEPRESSION WITH ANXIETY: Chronic | ICD-10-CM

## 2023-06-16 DIAGNOSIS — E78.5 DYSLIPIDEMIA: Chronic | ICD-10-CM

## 2023-06-16 DIAGNOSIS — M35.3 POLYMYALGIA RHEUMATICA (HCC): Primary | ICD-10-CM

## 2023-06-16 DIAGNOSIS — J45.909 ASTHMA, UNSPECIFIED ASTHMA SEVERITY, UNSPECIFIED WHETHER COMPLICATED, UNSPECIFIED WHETHER PERSISTENT: ICD-10-CM

## 2023-06-16 DIAGNOSIS — J45.30 MILD PERSISTENT ASTHMA WITHOUT COMPLICATION: ICD-10-CM

## 2023-06-16 PROBLEM — F33.9 DEPRESSION, RECURRENT (HCC): Status: RESOLVED | Noted: 2021-05-20 | Resolved: 2023-06-16

## 2023-06-16 PROCEDURE — 99214 OFFICE O/P EST MOD 30 MIN: CPT | Performed by: FAMILY MEDICINE

## 2023-06-16 RX ORDER — FLUTICASONE PROPIONATE AND SALMETEROL 100; 50 UG/1; UG/1
1 POWDER RESPIRATORY (INHALATION) EVERY 12 HOURS
Qty: 120 BLISTER | Refills: 1 | Status: SHIPPED | OUTPATIENT
Start: 2023-06-16

## 2023-06-16 NOTE — PROGRESS NOTES
Name: Emeterio Ortiz      : 1945      MRN: [id number]  Encounter Provider: Elmira Wu MD  Encounter Date: 2023   Encounter department: FAMILY PRACTICE AT [facility]     1  Polymyalgia rheumatica ([facility] [address] )    2  Depression with anxiety    3  Dyslipidemia    4  Benign essential hypertension    5  Mild persistent asthma without complication    6  Asthma, unspecified asthma severity, unspecified whether complicated, unspecified whether persistent  -     Fluticasone-Salmeterol (Advair) 100-50 mcg/dose inhaler; Inhale 1 puff every 12 (twelve) hours Rinse mouth after use  Following with rheumatology for polymyalgia rheumatica  On daily prednisone  Depression anxiety stable on Wellbutrin  Continue Wellbutrin  BP controlled  Continue losartan hydrochlorothiazide  Asthma is stable  Only acts up when allergies are really bad  Continue Advair as needed  Subjective      Cassie office for 6-month follow-up  Doing well has no acute concerns  Would like refill on Advair  Following with rheumatology out of network  On prednisone daily  No issues with chronic medications  Reports all medical problems are controlled and stable  Review of Systems   All other systems reviewed and are negative        Current Outpatient Medications on File Prior to Visit   Medication Sig   • acetaminophen (TYLENOL) 325 mg tablet Take 2 tablets (650 mg total) by mouth every 4 (four) hours as needed for mild pain (fever > 100 5)   • atorvastatin (LIPITOR) 10 mg tablet Take 1 tablet (10 mg total) by mouth daily   • buPROPion (WELLBUTRIN XL) 150 mg 24 hr tablet TAKE 1 TABLET EVERY DAY   • Calcium Carbonate 1500 (600 Ca) MG TABS Take 1 tablet by mouth 2 (two) times a day   • Cetirizine HCl (ZYRTEC ALLERGY PO) Take 1 tablet by mouth   • escitalopram (LEXAPRO) 20 mg tablet TAKE 1 TABLET EVERY DAY   • esomeprazole (NexIUM) 40 MG capsule Take 1 capsule (40 mg total) by mouth daily "(Patient taking differently: Take 20 mg by mouth daily)   • fluticasone (FLONASE) 50 mcg/act nasal spray into each nostril   • losartan-hydrochlorothiazide (HYZAAR) 100-12 5 MG per tablet Take 1 tablet by mouth daily   • predniSONE 5 mg tablet Take 5 mg by mouth daily   • [DISCONTINUED] Fluticasone-Salmeterol (Advair) 100-50 mcg/dose inhaler INHALE 1 PUFF EVERY 12 (TWELVE) HOURS   • docusate sodium (COLACE) 100 mg capsule Take 1 capsule (100 mg total) by mouth 2 (two) times a day (Patient taking differently: Take 100 mg by mouth 2 (two) times a day as needed for constipation )   • [DISCONTINUED] albuterol (PROVENTIL HFA,VENTOLIN HFA) 90 mcg/act inhaler Inhale 2 puffs every 4 (four) hours as needed   • [DISCONTINUED] clindamycin (CLEOCIN) 150 mg capsule TAKE FOUR CAPSULES BY MOUTH ONE HOUR BEFORE APPOINTMENT (Patient not taking: Reported on 6/16/2023)   • [DISCONTINUED] Ginger, Zingiber officinalis, (GINGER EXTRACT PO) Take by mouth   • [DISCONTINUED] methocarbamol (ROBAXIN) 500 mg tablet Take 1 tablet (500 mg total) by mouth daily at bedtime as needed for muscle spasms       Objective     /80 (BP Location: Left arm, Patient Position: Sitting, Cuff Size: Standard)   Pulse 68   Temp (!) 97 2 °F (36 2 °C) (Tympanic)   Ht 5' 6 5\" (1 689 m)   Wt 83 kg (183 lb)   SpO2 99%   BMI 29 09 kg/m²     Physical Exam  Vitals and nursing note reviewed  Constitutional:       General: She is not in acute distress  Appearance: Normal appearance  She is not ill-appearing, toxic-appearing or diaphoretic  Eyes:      General:         Right eye: No discharge  Left eye: No discharge  Extraocular Movements: Extraocular movements intact  Conjunctiva/sclera: Conjunctivae normal    Cardiovascular:      Rate and Rhythm: Normal rate  Pulmonary:      Effort: Pulmonary effort is normal    Musculoskeletal:      Cervical back: Normal range of motion and neck supple     Neurological:      General: No focal deficit " present  Mental Status: She is alert and oriented to person, place, and time  Psychiatric:         Mood and Affect: Mood normal          Behavior: Behavior normal          Thought Content:  Thought content normal          Judgment: Judgment normal        Abdirahman Bond MD

## 2023-06-25 DIAGNOSIS — I10 HYPERTENSION, UNSPECIFIED TYPE: ICD-10-CM

## 2023-06-26 RX ORDER — LOSARTAN POTASSIUM AND HYDROCHLOROTHIAZIDE 12.5; 1 MG/1; MG/1
TABLET ORAL
Qty: 90 TABLET | Refills: 0 | Status: SHIPPED | OUTPATIENT
Start: 2023-06-26

## 2023-08-08 DIAGNOSIS — F41.8 DEPRESSION WITH ANXIETY: ICD-10-CM

## 2023-08-08 DIAGNOSIS — F32.89 OTHER DEPRESSION: ICD-10-CM

## 2023-08-08 RX ORDER — BUPROPION HYDROCHLORIDE 150 MG/1
TABLET ORAL
Qty: 90 TABLET | Refills: 1 | Status: SHIPPED | OUTPATIENT
Start: 2023-08-08

## 2023-08-08 RX ORDER — ESCITALOPRAM OXALATE 20 MG/1
TABLET ORAL
Qty: 90 TABLET | Refills: 1 | Status: SHIPPED | OUTPATIENT
Start: 2023-08-08

## 2023-08-29 ENCOUNTER — APPOINTMENT (OUTPATIENT)
Dept: LAB | Facility: CLINIC | Age: 78
End: 2023-08-29
Payer: MEDICARE

## 2023-08-29 DIAGNOSIS — Z79.899 ENCOUNTER FOR LONG-TERM (CURRENT) USE OF OTHER MEDICATIONS: ICD-10-CM

## 2023-08-29 DIAGNOSIS — M35.3 POLYMYALGIA RHEUMATICA (HCC): ICD-10-CM

## 2023-08-29 LAB
CRP SERPL QL: 8.6 MG/L
ERYTHROCYTE [SEDIMENTATION RATE] IN BLOOD: 22 MM/HOUR (ref 0–29)

## 2023-08-29 PROCEDURE — 85652 RBC SED RATE AUTOMATED: CPT

## 2023-08-29 PROCEDURE — 36415 COLL VENOUS BLD VENIPUNCTURE: CPT

## 2023-08-29 PROCEDURE — 86140 C-REACTIVE PROTEIN: CPT

## 2023-09-13 DIAGNOSIS — E78.2 MIXED HYPERLIPIDEMIA: ICD-10-CM

## 2023-09-14 RX ORDER — ATORVASTATIN CALCIUM 10 MG/1
10 TABLET, FILM COATED ORAL DAILY
Qty: 90 TABLET | Refills: 1 | Status: SHIPPED | OUTPATIENT
Start: 2023-09-14

## 2023-12-11 ENCOUNTER — OFFICE VISIT (OUTPATIENT)
Dept: FAMILY MEDICINE CLINIC | Facility: CLINIC | Age: 78
End: 2023-12-11
Payer: MEDICARE

## 2023-12-11 VITALS
SYSTOLIC BLOOD PRESSURE: 138 MMHG | OXYGEN SATURATION: 97 % | HEIGHT: 67 IN | DIASTOLIC BLOOD PRESSURE: 80 MMHG | HEART RATE: 85 BPM | TEMPERATURE: 98.4 F | WEIGHT: 176.4 LBS | BODY MASS INDEX: 27.69 KG/M2

## 2023-12-11 DIAGNOSIS — Z12.31 ENCOUNTER FOR SCREENING MAMMOGRAM FOR MALIGNANT NEOPLASM OF BREAST: ICD-10-CM

## 2023-12-11 DIAGNOSIS — Z00.00 MEDICARE ANNUAL WELLNESS VISIT, SUBSEQUENT: Primary | ICD-10-CM

## 2023-12-11 DIAGNOSIS — F41.8 DEPRESSION WITH ANXIETY: Chronic | ICD-10-CM

## 2023-12-11 DIAGNOSIS — J45.30 MILD PERSISTENT ASTHMA WITHOUT COMPLICATION: ICD-10-CM

## 2023-12-11 DIAGNOSIS — R32 URINARY INCONTINENCE, UNSPECIFIED TYPE: ICD-10-CM

## 2023-12-11 DIAGNOSIS — Z12.11 SCREENING FOR MALIGNANT NEOPLASM OF COLON: ICD-10-CM

## 2023-12-11 DIAGNOSIS — I10 BENIGN ESSENTIAL HYPERTENSION: Chronic | ICD-10-CM

## 2023-12-11 DIAGNOSIS — E78.5 DYSLIPIDEMIA: Chronic | ICD-10-CM

## 2023-12-11 DIAGNOSIS — N18.31 STAGE 3A CHRONIC KIDNEY DISEASE (HCC): ICD-10-CM

## 2023-12-11 PROCEDURE — 99214 OFFICE O/P EST MOD 30 MIN: CPT | Performed by: FAMILY MEDICINE

## 2023-12-11 PROCEDURE — G0439 PPPS, SUBSEQ VISIT: HCPCS | Performed by: FAMILY MEDICINE

## 2023-12-11 RX ORDER — IBUPROFEN 600 MG/1
TABLET ORAL
COMMUNITY
Start: 2023-11-07 | End: 2023-12-11

## 2023-12-11 RX ORDER — CHLORHEXIDINE GLUCONATE ORAL RINSE 1.2 MG/ML
SOLUTION DENTAL
COMMUNITY
Start: 2023-11-07 | End: 2023-12-11

## 2023-12-11 RX ORDER — CLINDAMYCIN HYDROCHLORIDE 150 MG/1
CAPSULE ORAL
COMMUNITY
Start: 2023-11-07 | End: 2023-12-11

## 2023-12-11 NOTE — PROGRESS NOTES
Assessment and Plan:     Problem List Items Addressed This Visit       Benign essential hypertension (Chronic)    Relevant Orders    Comprehensive metabolic panel    Lipid panel    CKD (chronic kidney disease), stage III ([address])    Depression with anxiety (Chronic)    Dyslipidemia (Chronic)    Relevant Orders    Lipid panel    Mild persistent asthma without complication     Other Visit Diagnoses       Medicare annual wellness visit, subsequent    -  Primary    Relevant Orders    Cologuard    Encounter for screening mammogram for malignant neoplasm of breast        Relevant Orders    Mammo screening bilateral w 3d & cad    Screening for malignant neoplasm of colon        Relevant Orders    Cologuard    Urinary incontinence, unspecified type            Hypertension controlled. Continue losartan hydrochlorothiazide. Has history of lipidemia continue Lipitor 10 mg daily. Check lipid panel with CMP. Mood stable today. Continue Lexapro and Wellbutrin. Has history of osteopenia and on chronic steroids managed by rheumatology for polymyalgia rheumatica. Recommended to get DEXA scan ordered by rheumatologist.  Will also order mammogram for breast cancer screening as well as Cologuard for colon cancer screening. Follow-up in 6 months or sooner if needed. BMI Counseling: Body mass index is 28.05 kg/m². The BMI is above normal. Nutrition recommendations include decreasing portion sizes, encouraging healthy choices of fruits and vegetables, decreasing fast food intake, consuming healthier snacks, limiting drinks that contain sugar, moderation in carbohydrate intake, increasing intake of lean protein, reducing intake of saturated and trans fat and reducing intake of cholesterol. Exercise recommendations include exercising 3-5 times per week. No pharmacotherapy was ordered. Rationale for BMI follow-up plan is due to patient being overweight or obese.      Urinary Incontinence Plan of Care: counseling topics discussed: limit alcohol, caffeine, spicy foods, and acidic foods, keeping a bladder diary and limiting fluid intake 3-4 hours before bed. Preventive health issues were discussed with patient, and age appropriate screening tests were ordered as noted in patient's After Visit Summary. Personalized health advice and appropriate referrals for health education or preventive services given if needed, as noted in patient's After Visit Summary. History of Present Illness:     Patient presents for a Medicare Wellness Visit    Patient presents to the office today for Medicare wellness visit and follow-up. Feeling well today no acute concerns or complaints. Has history of hypertension on losartan hydrochlorothiazide. Blood pressure is well-controlled. Denies any symptoms of elevated blood pressures. Following with rheumatology for polymyalgia rheumatica. She still on daily steroids. Rheumatologist ordered DEXA scan due to her history of osteopenia while on steroids but she still did not schedule it. She is due for mammogram this year as well and does need new Cologuard. She would like new prescription. Her mood is stable on Lexapro and Wellbutrin. She would like to continue current treatment plan. Patient Care Team:  Lei Hines MD as PCP - General (Family Medicine)  MD Rosa Khan DO Nydia Lobos, DO     Review of Systems:     Review of Systems   All other systems reviewed and are negative.        Problem List:     Patient Active Problem List   Diagnosis    Age-related nuclear cataract, bilateral    Allergic rhinitis    Anemia    Benign essential hypertension    Central sleep apnea    Irritable bowel syndrome with diarrhea    CKD (chronic kidney disease), stage III (HCC)    Depression with anxiety    Dyslipidemia    Mild persistent asthma without complication    Nephrosclerosis    Osteoarthritis of left knee    Osteopenia    Overactive bladder    Primary insomnia    Vitamin B12 deficiency    Vitamin D deficiency    Chondromalacia patellae    Greater trochanteric pain syndrome    Gastroesophageal reflux disease without esophagitis    Primary osteoarthritis of right knee    Hx of total knee replacement, right    Polymyalgia rheumatica (HCC)      Past Medical and Surgical History:     Past Medical History:   Diagnosis Date    Arthritis     Asthma     Chronic kidney disease     CKD stage 3    Hyperlipidemia     Hypertension     Nasal polyps     Polymyalgia rheumatica ([address] [facility])      Past Surgical History:   Procedure Laterality Date    COLONOSCOPY      DILATION AND CURETTAGE OF UTERUS      HYSTEROSCOPY      of uterus  d&c 1997    HYSTEROSCOPY W/ POLYPECTOMY      2000 and 2001  resolved    NOSE SURGERY      nasal endoscopy polypectomy    NOSE SURGERY      TN ARTHRP KNE CONDYLE&PLATU MEDIAL&LAT COMPARTMENTS Right 10/7/2020    Procedure: KNEE TOTAL ARTHROPLASTY;  Surgeon: Angel Camejo DO;  Location: [facility] MAIN OR;  Service: Orthopedics    TN ESOPHAGOGASTRODUODENOSCOPY TRANSORAL DIAGNOSTIC N/A 2/6/2019    Procedure: ESOPHAGOGASTRODUODENOSCOPY (EGD); Surgeon: Wili Schreiber MD;  Location: [facility] GI LAB;   Service: Gastroenterology    TONSILLECTOMY      TOTAL KNEE ARTHROPLASTY Left       Family History:     Family History   Problem Relation Age of Onset    Heart attack Mother     Osteoporosis Mother     Uterine cancer Mother     Heart disease Mother     No Known Problems Father     No Known Problems Daughter     No Known Problems Maternal Grandmother     No Known Problems Maternal Aunt       Social History:     Social History     Socioeconomic History    Marital status: /Civil Union     Spouse name: None    Number of children: None    Years of education: None    Highest education level: None   Occupational History    None   Tobacco Use    Smoking status: Never    Smokeless tobacco: Never   Vaping Use    Vaping Use: Never used   Substance and Sexual Activity    Alcohol use: Yes     Comment: social    Drug use: No    Sexual activity: Not Currently   Other Topics Concern    None   Social History Narrative    Uses safety equipment  Seat belt     Social Determinants of Health     Financial Resource Strain: Low Risk  (12/11/2023)    Overall Financial Resource Strain (CARDIA)     Difficulty of Paying Living Expenses: Not hard at all   Food Insecurity: Not on file   Transportation Needs: No Transportation Needs (12/11/2023)    PRAPARE - Transportation     Lack of Transportation (Medical): No     Lack of Transportation (Non-Medical):  No   Physical Activity: Inactive (5/20/2021)    Exercise Vital Sign     Days of Exercise per Week: 0 days     Minutes of Exercise per Session: 0 min   Stress: Not on file   Social Connections: Not on file   Intimate Partner Violence: Not on file   Housing Stability: Not on file      Medications and Allergies:     Current Outpatient Medications   Medication Sig Dispense Refill    acetaminophen (TYLENOL) 325 mg tablet Take 2 tablets (650 mg total) by mouth every 4 (four) hours as needed for mild pain (fever > 100.5)  0    atorvastatin (LIPITOR) 10 mg tablet TAKE 1 TABLET EVERY DAY 90 tablet 1    buPROPion (WELLBUTRIN XL) 150 mg 24 hr tablet TAKE 1 TABLET EVERY DAY 90 tablet 1    Calcium Carbonate 1500 (600 Ca) MG TABS Take 1 tablet by mouth 2 (two) times a day      Cetirizine HCl (ZYRTEC ALLERGY PO) Take 1 tablet by mouth      docusate sodium (COLACE) 100 mg capsule Take 1 capsule (100 mg total) by mouth 2 (two) times a day (Patient taking differently: Take 100 mg by mouth 2 (two) times a day as needed for constipation) 10 capsule 0    escitalopram (LEXAPRO) 20 mg tablet TAKE 1 TABLET EVERY DAY 90 tablet 1    esomeprazole (NexIUM) 40 MG capsule Take 1 capsule (40 mg total) by mouth daily (Patient taking differently: Take 20 mg by mouth daily) 30 capsule 3    fluticasone (FLONASE) 50 mcg/act nasal spray into each nostril      Fluticasone-Salmeterol (Advair) 100-50 mcg/dose inhaler Inhale 1 puff every 12 (twelve) hours Rinse mouth after use. 120 blister 1    losartan-hydrochlorothiazide (HYZAAR) 100-12.5 MG per tablet TAKE 1 TABLET EVERY DAY 90 tablet 0    predniSONE 5 mg tablet Take 5 mg by mouth daily       No current facility-administered medications for this visit. Allergies   Allergen Reactions    Clarithromycin GI Intolerance     Reaction Date: 12Apr2011;     Codeine Hives     Reaction Date: 12Apr2011;     Estradiol      Annotation - [id number]: ELEVATES BP    Hydrocodone Hives    Hydrocodone-Acetaminophen Rash    Meloxicam Edema     Feet and ankles swell    Tramadol Other (See Comments)     red,itchy      Immunizations:     Immunization History   Administered Date(s) Administered    COVID-19 MODERNA VACC 0.5 ML IM 03/16/2021, 04/15/2021    INFLUENZA 10/02/2013, 10/30/2014, 09/18/2015, 09/30/2016, 11/14/2016, 10/02/2017, 10/11/2017, 10/03/2018, 10/01/2019, 09/18/2020, 10/20/2021, 10/06/2022, 10/21/2023    Influenza Split High Dose Preservative Free IM 10/24/2012, 09/30/2016    Influenza, high dose seasonal 0.7 mL 09/28/2018, 10/01/2019, 09/18/2020    Influenza, seasonal, injectable 11/06/2009, 10/30/2014, 10/01/2015    Pneumococcal Conjugate 13-Valent 09/15/2015    Pneumococcal Polysaccharide PPV23 11/12/2020    Zoster 03/03/2014, 09/23/2014      Health Maintenance:         Topic Date Due    Breast Cancer Screening: Mammogram  11/18/2024    Hepatitis C Screening  Completed    Colorectal Cancer Screening  Discontinued         Topic Date Due    COVID-19 Vaccine (3 - Mehrdad Hasting series) 06/10/2021      Medicare Screening Tests and Risk Assessments:     Yolanda Blue is here for her Subsequent Wellness visit. Last Medicare Wellness visit information reviewed, patient interviewed and updates made to the record today. Health Risk Assessment:   Patient rates overall health as good. Patient feels that their physical health rating is slightly worse. Patient is very satisfied with their life. Eyesight was rated as same. Hearing was rated as same. Patient feels that their emotional and mental health rating is same. Patients states they are never, rarely angry. Patient states they are always unusually tired/fatigued. Pain experienced in the last 7 days has been some. Patient's pain rating has been 2/10. Patient states that she has experienced no weight loss or gain in last 6 months. Depression Screening:   PHQ-9 Score: 5      Fall Risk Screening: In the past year, patient has experienced: history of falling in past year    Number of falls: 1  Injured during fall?: No    Feels unsteady when standing or walking?: No    Worried about falling?: No      Urinary Incontinence Screening:   Patient has leaked urine accidently in the last six months. Home Safety:  Patient has trouble with stairs inside or outside of their home. Patient has working smoke alarms and has working carbon monoxide detector. Home safety hazards include: none. Nutrition:   Current diet is Regular. Medications:   Patient is currently taking over-the-counter supplements. OTC medications include: see medication list. Patient is able to manage medications. Activities of Daily Living (ADLs)/Instrumental Activities of Daily Living (IADLs):   Walk and transfer into and out of bed and chair?: Yes  Dress and groom yourself?: Yes    Bathe or shower yourself?: Yes    Feed yourself?  Yes  Do your laundry/housekeeping?: Yes  Manage your money, pay your bills and track your expenses?: Yes  Make your own meals?: Yes    Do your own shopping?: Yes    Previous Hospitalizations:   Any hospitalizations or ED visits within the last 12 months?: No      Advance Care Planning:   Living will: No    Durable POA for healthcare: No    Advanced directive: Yes    Advanced directive counseling given: Yes    ACP document given: Yes    End of Life Decisions reviewed with patient: Yes      Cognitive Screening:   Provider or family/friend/caregiver concerned regarding cognition?: No    PREVENTIVE SCREENINGS      Cardiovascular Screening:    General: Screening Not Indicated and History Lipid Disorder      Diabetes Screening:     General: Screening Current      Breast Cancer Screening:     General: Screening Current      Cervical Cancer Screening:    General: Screening Not Indicated      Osteoporosis Screening:    General: Risks and Benefits Discussed and Screening Current      Abdominal Aortic Aneurysm (AAA) Screening:        General: Risks and Benefits Discussed and Screening Not Indicated      Lung Cancer Screening:     General: Screening Not Indicated      Hepatitis C Screening:    General: Screening Current    Screening, Brief Intervention, and Referral to Treatment (SBIRT)    Screening  Typical number of drinks in a day: 0  Typical number of drinks in a week: 0  Interpretation: Low risk drinking behavior. Single Item Drug Screening:  How often have you used an illegal drug (including marijuana) or a prescription medication for non-medical reasons in the past year? never    Single Item Drug Screen Score: 0  Interpretation: Negative screen for possible drug use disorder    Brief Intervention  Alcohol & drug use screenings were reviewed. No concerns regarding substance use disorder identified. Other Counseling Topics:   Car/seat belt/driving safety, skin self-exam, sunscreen and calcium and vitamin D intake and regular weightbearing exercise. No results found. Physical Exam:     /80 (BP Location: Left arm, Patient Position: Sitting, Cuff Size: Standard)   Pulse 85   Temp 98.4 °F (36.9 °C) (Tympanic)   Ht 5' 6.5" (1.689 m)   Wt 80 kg (176 lb 6.4 oz)   SpO2 97%   BMI 28.05 kg/m²     Physical Exam  Vitals and nursing note reviewed. Constitutional:       General: She is not in acute distress. Appearance: Normal appearance. She is not ill-appearing, toxic-appearing or diaphoretic.    Cardiovascular:      Rate and Rhythm: Normal rate and regular rhythm. Pulses: Normal pulses. Heart sounds: Normal heart sounds. Pulmonary:      Effort: Pulmonary effort is normal.      Breath sounds: Normal breath sounds. Musculoskeletal:      Cervical back: Normal range of motion and neck supple. Psychiatric:         Mood and Affect: Mood normal.         Behavior: Behavior normal.         Thought Content:  Thought content normal.         Judgment: Judgment normal.          Veena Sen MD

## 2023-12-11 NOTE — PATIENT INSTRUCTIONS
Medicare Preventive Visit Patient Instructions  Thank you for completing your Welcome to Medicare Visit or Medicare Annual Wellness Visit today. Your next wellness visit will be due in one year (12/11/2024). The screening/preventive services that you may require over the next 5-10 years are detailed below. Some tests may not apply to you based off risk factors and/or age. Screening tests ordered at today's visit but not completed yet may show as past due. Also, please note that scanned in results may not display below. Preventive Screenings:  Service Recommendations Previous Testing/Comments   Colorectal Cancer Screening  * Colonoscopy    * Fecal Occult Blood Test (FOBT)/Fecal Immunochemical Test (FIT)  * Fecal DNA/Cologuard Test  * Flexible Sigmoidoscopy Age: 43-73 years old   Colonoscopy: every 10 years (may be performed more frequently if at higher risk)  OR  FOBT/FIT: every 1 year  OR  Cologuard: every 3 years  OR  Sigmoidoscopy: every 5 years  Screening may be recommended earlier than age 39 if at higher risk for colorectal cancer. Also, an individualized decision between you and your healthcare provider will decide whether screening between the ages of 77-80 would be appropriate. Colonoscopy: 04/28/2020  FOBT/FIT: Not on file  Cologuard: Not on file  Sigmoidoscopy: Not on file          Breast Cancer Screening Age: 36 years old  Frequency: every 1-2 years  Not required if history of left and right mastectomy Mammogram: 11/18/2022    Screening Current   Cervical Cancer Screening Between the ages of 21-29, pap smear recommended once every 3 years. Between the ages of 32-69, can perform pap smear with HPV co-testing every 5 years.    Recommendations may differ for women with a history of total hysterectomy, cervical cancer, or abnormal pap smears in past. Pap Smear: Not on file    Screening Not Indicated   Hepatitis C Screening Once for adults born between 1945 and 1965  More frequently in patients at high risk for Hepatitis C Hep C Antibody: 09/18/2019    Screening Current   Diabetes Screening 1-2 times per year if you're at risk for diabetes or have pre-diabetes Fasting glucose: 84 mg/dL (4/26/2023)  A1C: 5.4 % (9/8/2020)  Screening Current   Cholesterol Screening Once every 5 years if you don't have a lipid disorder. May order more often based on risk factors. Lipid panel: 05/04/2022    Screening Not Indicated  History Lipid Disorder     Other Preventive Screenings Covered by Medicare:  Abdominal Aortic Aneurysm (AAA) Screening: covered once if your at risk. You're considered to be at risk if you have a family history of AAA. Lung Cancer Screening: covers low dose CT scan once per year if you meet all of the following conditions: (1) Age 48-67; (2) No signs or symptoms of lung cancer; (3) Current smoker or have quit smoking within the last 15 years; (4) You have a tobacco smoking history of at least 20 pack years (packs per day multiplied by number of years you smoked); (5) You get a written order from a healthcare provider. Glaucoma Screening: covered annually if you're considered high risk: (1) You have diabetes OR (2) Family history of glaucoma OR (3)  aged 48 and older OR (3)  American aged 72 and older  Osteoporosis Screening: covered every 2 years if you meet one of the following conditions: (1) You're estrogen deficient and at risk for osteoporosis based off medical history and other findings; (2) Have a vertebral abnormality; (3) On glucocorticoid therapy for more than 3 months; (4) Have primary hyperparathyroidism; (5) On osteoporosis medications and need to assess response to drug therapy. Last bone density test (DXA Scan): 05/27/2021. HIV Screening: covered annually if you're between the age of 14-79. Also covered annually if you are younger than 13 and older than 72 with risk factors for HIV infection.  For pregnant patients, it is covered up to 3 times per pregnancy. Immunizations:  Immunization Recommendations   Influenza Vaccine Annual influenza vaccination during flu season is recommended for all persons aged >= 6 months who do not have contraindications   Pneumococcal Vaccine   * Pneumococcal conjugate vaccine = PCV13 (Prevnar 13), PCV15 (Vaxneuvance), PCV20 (Prevnar 20)  * Pneumococcal polysaccharide vaccine = PPSV23 (Pneumovax) Adults 89-84 yo with certain risk factors or if 69+ yo  If never received any pneumonia vaccine: recommend Prevnar 20 (PCV20)  Give PCV20 if previously received 1 dose of PCV13 or PPSV23   Hepatitis B Vaccine 3 dose series if at intermediate or high risk (ex: diabetes, end stage renal disease, liver disease)   Respiratory syncytial virus (RSV) Vaccine - COVERED BY MEDICARE PART D  * RSVPreF3 (Arexvy) CDC recommends that adults 61years of age and older may receive a single dose of RSV vaccine using shared clinical decision-making (SCDM)   Tetanus (Td) Vaccine - COST NOT COVERED BY MEDICARE PART B Following completion of primary series, a booster dose should be given every 10 years to maintain immunity against tetanus. Td may also be given as tetanus wound prophylaxis. Tdap Vaccine - COST NOT COVERED BY MEDICARE PART B Recommended at least once for all adults. For pregnant patients, recommended with each pregnancy. Shingles Vaccine (Shingrix) - COST NOT COVERED BY MEDICARE PART B  2 shot series recommended in those 19 years and older who have or will have weakened immune systems or those 50 years and older     Health Maintenance Due:      Topic Date Due   • Breast Cancer Screening: Mammogram  11/18/2024   • Hepatitis C Screening  Completed   • Colorectal Cancer Screening  Discontinued     Immunizations Due:      Topic Date Due   • COVID-19 Vaccine (3 - Reggy Coombe series) 06/10/2021     Advance Directives   What are advance directives? Advance directives are legal documents that state your wishes and plans for medical care.  These plans are made ahead of time in case you lose your ability to make decisions for yourself. Advance directives can apply to any medical decision, such as the treatments you want, and if you want to donate organs. What are the types of advance directives? There are many types of advance directives, and each state has rules about how to use them. You may choose a combination of any of the following:  Living will: This is a written record of the treatment you want. You can also choose which treatments you do not want, which to limit, and which to stop at a certain time. This includes surgery, medicine, IV fluid, and tube feedings. Durable power of  for healthcare [facility]): This is a written record that states who you want to make healthcare choices for you when you are unable to make them for yourself. This person, called a proxy, is usually a family member or a friend. You may choose more than 1 proxy. Do not resuscitate (DNR) order:  A DNR order is used in case your heart stops beating or you stop breathing. It is a request not to have certain forms of treatment, such as CPR. A DNR order may be included in other types of advance directives. Medical directive: This covers the care that you want if you are in a coma, near death, or unable to make decisions for yourself. You can list the treatments you want for each condition. Treatment may include pain medicine, surgery, blood transfusions, dialysis, IV or tube feedings, and a ventilator (breathing machine). Values history: This document has questions about your views, beliefs, and how you feel and think about life. This information can help others choose the care that you would choose. Why are advance directives important? An advance directive helps you control your care. Although spoken wishes may be used, it is better to have your wishes written down. Spoken wishes can be misunderstood, or not followed.  Treatments may be given even if you do not want them. An advance directive may make it easier for your family to make difficult choices about your care. Fall Prevention    Fall prevention  includes ways to make your home and other areas safer. It also includes ways you can move more carefully to prevent a fall. Health conditions that cause changes in your blood pressure, vision, or muscle strength and coordination may increase your risk for falls. Medicines may also increase your risk for falls if they make you dizzy, weak, or sleepy. Fall prevention tips:   Stand or sit up slowly. Use assistive devices as directed. Wear shoes that fit well and have soles that . Wear a personal alarm. Stay active. Manage your medical conditions. Home Safety Tips:  Add items to prevent falls in the bathroom. Keep paths clear. Install bright lights in your home. Keep items you use often on shelves within reach. Paint or place reflective tape on the edges of your stairs. Urinary Incontinence   Urinary incontinence (UI)  is when you lose control of your bladder. UI develops because your bladder cannot store or empty urine properly. The 3 most common types of UI are stress incontinence, urge incontinence, or both. Medicines:   May be given to help strengthen your bladder control. Report any side effects of medication to your healthcare provider. Do pelvic muscle exercises often:  Your pelvic muscles help you stop urinating. Squeeze these muscles tight for 5 seconds, then relax for 5 seconds. Gradually work up to squeezing for 10 seconds. Do 3 sets of 15 repetitions a day, or as directed. This will help strengthen your pelvic muscles and improve bladder control. Train your bladder:  Go to the bathroom at set times, such as every 2 hours, even if you do not feel the urge to go. You can also try to hold your urine when you feel the urge to go. For example, hold your urine for 5 minutes when you feel the urge to go.  As that becomes easier, hold your urine for 10 minutes. Self-care:   Keep a UI record. Write down how often you leak urine and how much you leak. Make a note of what you were doing when you leaked urine. Drink liquids as directed. You may need to limit the amount of liquid you drink to help control your urine leakage. Do not drink any liquid right before you go to bed. Limit or do not have drinks that contain caffeine or alcohol. Prevent constipation. Eat a variety of high-fiber foods. Good examples are high-fiber cereals, beans, vegetables, and whole-grain breads. Walking is the best way to trigger your intestines to have a bowel movement. Exercise regularly and maintain a healthy weight. Weight loss and exercise will decrease pressure on your bladder and help you control your leakage. Use a catheter as directed  to help empty your bladder. A catheter is a tiny, plastic tube that is put into your bladder to drain your urine. Go to behavior therapy as directed. Behavior therapy may be used to help you learn to control your urge to urinate. Weight Management   Why it is important to manage your weight:  Being overweight increases your risk of health conditions such as heart disease, high blood pressure, type 2 diabetes, and certain types of cancer. It can also increase your risk for osteoarthritis, sleep apnea, and other respiratory problems. Aim for a slow, steady weight loss. Even a small amount of weight loss can lower your risk of health problems. How to lose weight safely:  A safe and healthy way to lose weight is to eat fewer calories and get regular exercise. You can lose up about 1 pound a week by decreasing the number of calories you eat by 500 calories each day. Healthy meal plan for weight management:  A healthy meal plan includes a variety of foods, contains fewer calories, and helps you stay healthy. A healthy meal plan includes the following:  Eat whole-grain foods more often.   A healthy meal plan should contain fiber. Fiber is the part of grains, fruits, and vegetables that is not broken down by your body. Whole-grain foods are healthy and provide extra fiber in your diet. Some examples of whole-grain foods are whole-wheat breads and pastas, oatmeal, brown rice, and bulgur. Eat a variety of vegetables every day. Include dark, leafy greens such as spinach, kale, heladio greens, and mustard greens. Eat yellow and orange vegetables such as carrots, sweet potatoes, and winter squash. Eat a variety of fruits every day. Choose fresh or canned fruit (canned in its own juice or light syrup) instead of juice. Fruit juice has very little or no fiber. Eat low-fat dairy foods. Drink fat-free (skim) milk or 1% milk. Eat fat-free yogurt and low-fat cottage cheese. Try low-fat cheeses such as mozzarella and other reduced-fat cheeses. Choose meat and other protein foods that are low in fat. Choose beans or other legumes such as split peas or lentils. Choose fish, skinless poultry (chicken or turkey), or lean cuts of red meat (beef or pork). Before you cook meat or poultry, cut off any visible fat. Use less fat and oil. Try baking foods instead of frying them. Add less fat, such as margarine, sour cream, regular salad dressing and mayonnaise to foods. Eat fewer high-fat foods. Some examples of high-fat foods include french fries, doughnuts, ice cream, and cakes. Eat fewer sweets. Limit foods and drinks that are high in sugar. This includes candy, cookies, regular soda, and sweetened drinks. Exercise:  Exercise at least 30 minutes per day on most days of the week. Some examples of exercise include walking, biking, dancing, and swimming. You can also fit in more physical activity by taking the stairs instead of the elevator or parking farther away from stores. Ask your healthcare provider about the best exercise plan for you.       © Copyright [employer] 2018 Information is for End User's use only and may not be sold, redistributed or otherwise used for commercial purposes.  All illustrations and images included in CareNotes® are the copyrighted property of [employer] or [address] [place] [address]

## 2024-01-31 LAB — COLOGUARD RESULT REPORTABLE: NEGATIVE

## 2024-02-01 DIAGNOSIS — F41.8 DEPRESSION WITH ANXIETY: ICD-10-CM

## 2024-02-01 DIAGNOSIS — E78.2 MIXED HYPERLIPIDEMIA: ICD-10-CM

## 2024-02-01 DIAGNOSIS — I10 HYPERTENSION, UNSPECIFIED TYPE: ICD-10-CM

## 2024-02-01 DIAGNOSIS — F32.89 OTHER DEPRESSION: ICD-10-CM

## 2024-02-01 RX ORDER — ATORVASTATIN CALCIUM 10 MG/1
10 TABLET, FILM COATED ORAL DAILY
Qty: 90 TABLET | Refills: 1 | Status: SHIPPED | OUTPATIENT
Start: 2024-02-01

## 2024-02-01 RX ORDER — ESCITALOPRAM OXALATE 20 MG/1
20 TABLET ORAL DAILY
Qty: 90 TABLET | Refills: 1 | Status: SHIPPED | OUTPATIENT
Start: 2024-02-01

## 2024-02-01 RX ORDER — BUPROPION HYDROCHLORIDE 150 MG/1
150 TABLET ORAL DAILY
Qty: 90 TABLET | Refills: 1 | Status: SHIPPED | OUTPATIENT
Start: 2024-02-01

## 2024-02-01 RX ORDER — LOSARTAN POTASSIUM AND HYDROCHLOROTHIAZIDE 12.5; 1 MG/1; MG/1
1 TABLET ORAL DAILY
Qty: 90 TABLET | Refills: 1 | Status: SHIPPED | OUTPATIENT
Start: 2024-02-01

## 2024-02-01 NOTE — TELEPHONE ENCOUNTER
Patient walked into the office [facility]. RX card was uploaded into documents. Patient is aware that 2 of the 4 medications are due for refills.     Patient also stated that if she needs medications quickly then scripts can be sent to [facility]

## 2024-03-11 ENCOUNTER — LAB (OUTPATIENT)
Dept: LAB | Facility: CLINIC | Age: 79
End: 2024-03-11
Payer: MEDICARE

## 2024-03-11 DIAGNOSIS — Z79.899 ENCOUNTER FOR LONG-TERM (CURRENT) USE OF OTHER MEDICATIONS: ICD-10-CM

## 2024-03-11 DIAGNOSIS — E78.5 DYSLIPIDEMIA: Chronic | ICD-10-CM

## 2024-03-11 DIAGNOSIS — I10 BENIGN ESSENTIAL HYPERTENSION: Chronic | ICD-10-CM

## 2024-03-11 DIAGNOSIS — M35.3 PMR (POLYMYALGIA RHEUMATICA) (HCC): ICD-10-CM

## 2024-03-11 LAB
ALBUMIN SERPL BCP-MCNC: 4.2 G/DL (ref 3.5–5)
ALP SERPL-CCNC: 61 U/L (ref 34–104)
ALT SERPL W P-5'-P-CCNC: 14 U/L (ref 7–52)
ANION GAP SERPL CALCULATED.3IONS-SCNC: 10 MMOL/L
AST SERPL W P-5'-P-CCNC: 17 U/L (ref 13–39)
BASOPHILS # BLD AUTO: 0.02 THOUSANDS/ÂΜL (ref 0–0.1)
BASOPHILS NFR BLD AUTO: 0 % (ref 0–1)
BILIRUB SERPL-MCNC: 0.49 MG/DL (ref 0.2–1)
BUN SERPL-MCNC: 17 MG/DL (ref 5–25)
CALCIUM SERPL-MCNC: 9.5 MG/DL (ref 8.4–10.2)
CHLORIDE SERPL-SCNC: 100 MMOL/L (ref 96–108)
CHOLEST SERPL-MCNC: 187 MG/DL
CO2 SERPL-SCNC: 33 MMOL/L (ref 21–32)
CREAT SERPL-MCNC: 1.19 MG/DL (ref 0.6–1.3)
CRP SERPL QL: 6.9 MG/L
EOSINOPHIL # BLD AUTO: 0.17 THOUSAND/ÂΜL (ref 0–0.61)
EOSINOPHIL NFR BLD AUTO: 2 % (ref 0–6)
ERYTHROCYTE [DISTWIDTH] IN BLOOD BY AUTOMATED COUNT: 12.9 % (ref 11.6–15.1)
GFR SERPL CREATININE-BSD FRML MDRD: 44 ML/MIN/1.73SQ M
GLUCOSE P FAST SERPL-MCNC: 80 MG/DL (ref 65–99)
HCT VFR BLD AUTO: 37.3 % (ref 34.8–46.1)
HDLC SERPL-MCNC: 67 MG/DL
HGB BLD-MCNC: 12 G/DL (ref 11.5–15.4)
IMM GRANULOCYTES # BLD AUTO: 0.08 THOUSAND/UL (ref 0–0.2)
IMM GRANULOCYTES NFR BLD AUTO: 1 % (ref 0–2)
LDLC SERPL CALC-MCNC: 85 MG/DL (ref 0–100)
LYMPHOCYTES # BLD AUTO: 1.96 THOUSANDS/ÂΜL (ref 0.6–4.47)
LYMPHOCYTES NFR BLD AUTO: 21 % (ref 14–44)
MCH RBC QN AUTO: 30.6 PG (ref 26.8–34.3)
MCHC RBC AUTO-ENTMCNC: 32.2 G/DL (ref 31.4–37.4)
MCV RBC AUTO: 95 FL (ref 82–98)
MONOCYTES # BLD AUTO: 0.6 THOUSAND/ÂΜL (ref 0.17–1.22)
MONOCYTES NFR BLD AUTO: 6 % (ref 4–12)
NEUTROPHILS # BLD AUTO: 6.54 THOUSANDS/ÂΜL (ref 1.85–7.62)
NEUTS SEG NFR BLD AUTO: 70 % (ref 43–75)
NONHDLC SERPL-MCNC: 120 MG/DL
NRBC BLD AUTO-RTO: 0 /100 WBCS
PLATELET # BLD AUTO: 235 THOUSANDS/UL (ref 149–390)
PMV BLD AUTO: 11 FL (ref 8.9–12.7)
POTASSIUM SERPL-SCNC: 3.8 MMOL/L (ref 3.5–5.3)
PROT SERPL-MCNC: 6.5 G/DL (ref 6.4–8.4)
RBC # BLD AUTO: 3.92 MILLION/UL (ref 3.81–5.12)
SODIUM SERPL-SCNC: 143 MMOL/L (ref 135–147)
TRIGL SERPL-MCNC: 177 MG/DL
WBC # BLD AUTO: 9.37 THOUSAND/UL (ref 4.31–10.16)

## 2024-03-11 PROCEDURE — 85025 COMPLETE CBC W/AUTO DIFF WBC: CPT

## 2024-03-11 PROCEDURE — 36415 COLL VENOUS BLD VENIPUNCTURE: CPT

## 2024-03-11 PROCEDURE — 80061 LIPID PANEL: CPT

## 2024-03-11 PROCEDURE — 80053 COMPREHEN METABOLIC PANEL: CPT

## 2024-03-11 PROCEDURE — 86140 C-REACTIVE PROTEIN: CPT

## 2024-03-12 NOTE — RESULT ENCOUNTER NOTE
Labs show some mild abnormalities.  There is no need for concern and we can review these at your upcoming visit.

## 2024-06-05 ENCOUNTER — RA CDI HCC (OUTPATIENT)
Dept: OTHER | Facility: HOSPITAL | Age: 79
End: 2024-06-05

## 2024-06-11 ENCOUNTER — OFFICE VISIT (OUTPATIENT)
Dept: FAMILY MEDICINE CLINIC | Facility: CLINIC | Age: 79
End: 2024-06-11
Payer: MEDICARE

## 2024-06-11 VITALS
TEMPERATURE: 97.7 F | DIASTOLIC BLOOD PRESSURE: 78 MMHG | OXYGEN SATURATION: 96 % | HEART RATE: 67 BPM | BODY MASS INDEX: 28.56 KG/M2 | WEIGHT: 182 LBS | HEIGHT: 67 IN | SYSTOLIC BLOOD PRESSURE: 140 MMHG

## 2024-06-11 DIAGNOSIS — M35.3 POLYMYALGIA RHEUMATICA (HCC): ICD-10-CM

## 2024-06-11 DIAGNOSIS — F41.8 DEPRESSION WITH ANXIETY: Chronic | ICD-10-CM

## 2024-06-11 DIAGNOSIS — N18.31 STAGE 3A CHRONIC KIDNEY DISEASE (HCC): ICD-10-CM

## 2024-06-11 DIAGNOSIS — F33.9 DEPRESSION, RECURRENT (HCC): ICD-10-CM

## 2024-06-11 DIAGNOSIS — I10 BENIGN ESSENTIAL HYPERTENSION: Primary | Chronic | ICD-10-CM

## 2024-06-11 PROCEDURE — 99214 OFFICE O/P EST MOD 30 MIN: CPT | Performed by: FAMILY MEDICINE

## 2024-06-11 PROCEDURE — G2211 COMPLEX E/M VISIT ADD ON: HCPCS | Performed by: FAMILY MEDICINE

## 2024-06-11 NOTE — ASSESSMENT & PLAN NOTE
Lab Results   Component Value Date    EGFR 44 03/11/2024    EGFR 45 04/26/2023    EGFR 48 11/15/2022    CREATININE 1.19 03/11/2024    CREATININE 1.16 04/26/2023    CREATININE 1.12 11/15/2022   Stable.  Continue to avoid nephrotoxic agents

## 2024-06-11 NOTE — PROGRESS NOTES
"Ambulatory Visit  Name: Daphne York      : 1945      MRN: [id number]  Encounter Provider: Ilya Harry MD  Encounter Date: 2024   Encounter department: [facility]    Assessment & Plan   1. Benign essential hypertension  Assessment & Plan:  Continue losartan hydrochlorothiazide.  2. Polymyalgia rheumatica (HCC)  Assessment & Plan:  Following with rheumatology  3. Stage 3a chronic kidney disease (HCC)  Assessment & Plan:  Lab Results   Component Value Date    EGFR 44 2024    EGFR 45 2023    EGFR 48 11/15/2022    CREATININE 1.19 2024    CREATININE 1.16 2023    CREATININE 1.12 11/15/2022   Stable.  Continue to avoid nephrotoxic agents  4. Depression, recurrent (HCC)  5. Depression with anxiety  Assessment & Plan:  Controlled.  Continue Lexapro and Wellbutrin       History of Present Illness     Patient presents to the office today for follow-up visit.  Feeling well no major acute concerns.  Has history of hypertension.  Doing well on her blood pressure meds.  Blood pressures controlled at home.  She is on Lexapro and Wellbutrin for anxiety and depression.  She says her anxiety and depression are stable.  She is doing well on the current meds and does not need increase in dosage.  She is still following with rheumatology.        Review of Systems   All other systems reviewed and are negative.      Objective     /78 (BP Location: Left arm, Patient Position: Sitting, Cuff Size: Standard)   Pulse 67   Temp 97.7 °F (36.5 °C) (Tympanic)   Ht 5' 6.5\" (1.689 m)   Wt 82.6 kg (182 lb)   SpO2 96%   BMI 28.94 kg/m²     Physical Exam  Vitals and nursing note reviewed.   Constitutional:       General: She is not in acute distress.     Appearance: Normal appearance. She is well-developed. She is not ill-appearing, toxic-appearing or diaphoretic.   HENT:      Head: Normocephalic and atraumatic.   Eyes:      General:         Right eye: No discharge. "         Left eye: No discharge.      Extraocular Movements: Extraocular movements intact.      Conjunctiva/sclera: Conjunctivae normal.   Cardiovascular:      Rate and Rhythm: Normal rate.   Pulmonary:      Effort: Pulmonary effort is normal.   Skin:     General: Skin is warm and dry.      Capillary Refill: Capillary refill takes less than 2 seconds.   Neurological:      Mental Status: She is alert and oriented to person, place, and time.   Psychiatric:         Mood and Affect: Mood normal.         Behavior: Behavior normal.         Thought Content: Thought content normal.         Judgment: Judgment normal.       Administrative Statements

## 2024-07-03 ENCOUNTER — HOSPITAL ENCOUNTER (OUTPATIENT)
Dept: BONE DENSITY | Facility: HOSPITAL | Age: 79
Discharge: HOME | End: 2024-07-03
Payer: MEDICARE

## 2024-07-03 ENCOUNTER — HOSPITAL ENCOUNTER (OUTPATIENT)
Dept: MAMMOGRAPHY | Facility: HOSPITAL | Age: 79
Discharge: HOME | End: 2024-07-03
Attending: FAMILY MEDICINE
Payer: MEDICARE

## 2024-07-03 VITALS — WEIGHT: 178 LBS | BODY MASS INDEX: 28.61 KG/M2 | HEIGHT: 66 IN

## 2024-07-03 DIAGNOSIS — Z13.820 ENCOUNTER FOR SCREENING FOR OSTEOPOROSIS: ICD-10-CM

## 2024-07-03 DIAGNOSIS — Z12.31 ENCOUNTER FOR SCREENING MAMMOGRAM FOR MALIGNANT NEOPLASM OF BREAST: ICD-10-CM

## 2024-07-03 PROCEDURE — 77063 BREAST TOMOSYNTHESIS BI: CPT

## 2024-07-03 PROCEDURE — 77067 SCR MAMMO BI INCL CAD: CPT

## 2024-07-03 PROCEDURE — 77080 DXA BONE DENSITY AXIAL: CPT

## 2024-07-29 DIAGNOSIS — F41.8 DEPRESSION WITH ANXIETY: ICD-10-CM

## 2024-07-29 DIAGNOSIS — E78.2 MIXED HYPERLIPIDEMIA: ICD-10-CM

## 2024-07-29 DIAGNOSIS — I10 HYPERTENSION, UNSPECIFIED TYPE: ICD-10-CM

## 2024-07-30 RX ORDER — ATORVASTATIN CALCIUM 10 MG/1
10 TABLET, FILM COATED ORAL DAILY
Qty: 100 TABLET | Refills: 1 | Status: SHIPPED | OUTPATIENT
Start: 2024-07-30

## 2024-07-30 RX ORDER — BUPROPION HYDROCHLORIDE 150 MG/1
150 TABLET ORAL DAILY
Qty: 100 TABLET | Refills: 1 | Status: SHIPPED | OUTPATIENT
Start: 2024-07-30

## 2024-07-30 RX ORDER — LOSARTAN POTASSIUM AND HYDROCHLOROTHIAZIDE 12.5; 1 MG/1; MG/1
1 TABLET ORAL DAILY
Qty: 100 TABLET | Refills: 1 | Status: SHIPPED | OUTPATIENT
Start: 2024-07-30

## 2024-08-08 DIAGNOSIS — F32.89 OTHER DEPRESSION: ICD-10-CM

## 2024-08-08 RX ORDER — ESCITALOPRAM OXALATE 20 MG/1
20 TABLET ORAL DAILY
Qty: 90 TABLET | Refills: 1 | Status: SHIPPED | OUTPATIENT
Start: 2024-08-08

## 2024-08-08 NOTE — TELEPHONE ENCOUNTER
Reason for call:   [x] Refill   [] Prior Auth  [] Other:     Office:   [x] PCP/Provider - Piero  [] Specialty/Provider -     Medication: Escitalopram 20mg    Dose/Frequency: 1 tab daily    Quantity: 90    Pharmacy: [facility] - [place] - [facility] [phone]     Does the patient have enough for 3 days?   [] Yes   [x] No - Send as HP to POD

## 2024-09-26 ENCOUNTER — TELEPHONE (OUTPATIENT)
Age: 79
End: 2024-09-26

## 2024-09-26 NOTE — TELEPHONE ENCOUNTER
Suad from [employer] reached out to advise, she did receive the physician order on 9/4/24, but still needs recent office notes faxed over please.    Fax:  [phone]

## 2024-12-12 ENCOUNTER — OFFICE VISIT (OUTPATIENT)
Dept: FAMILY MEDICINE CLINIC | Facility: CLINIC | Age: 79
End: 2024-12-12
Payer: MEDICARE

## 2024-12-12 VITALS
WEIGHT: 187 LBS | TEMPERATURE: 95.9 F | SYSTOLIC BLOOD PRESSURE: 136 MMHG | DIASTOLIC BLOOD PRESSURE: 78 MMHG | HEIGHT: 66 IN | HEART RATE: 68 BPM | OXYGEN SATURATION: 95 % | BODY MASS INDEX: 30.05 KG/M2

## 2024-12-12 DIAGNOSIS — J45.909 ASTHMA, UNSPECIFIED ASTHMA SEVERITY, UNSPECIFIED WHETHER COMPLICATED, UNSPECIFIED WHETHER PERSISTENT: ICD-10-CM

## 2024-12-12 DIAGNOSIS — Z91.09 ENVIRONMENTAL ALLERGIES: ICD-10-CM

## 2024-12-12 DIAGNOSIS — Z00.00 MEDICARE ANNUAL WELLNESS VISIT, SUBSEQUENT: Primary | ICD-10-CM

## 2024-12-12 DIAGNOSIS — M54.2 NECK PAIN: ICD-10-CM

## 2024-12-12 DIAGNOSIS — F41.8 DEPRESSION WITH ANXIETY: ICD-10-CM

## 2024-12-12 PROCEDURE — G0439 PPPS, SUBSEQ VISIT: HCPCS | Performed by: FAMILY MEDICINE

## 2024-12-12 PROCEDURE — 99214 OFFICE O/P EST MOD 30 MIN: CPT | Performed by: FAMILY MEDICINE

## 2024-12-12 RX ORDER — FLUTICASONE PROPIONATE AND SALMETEROL 100; 50 UG/1; UG/1
1 POWDER RESPIRATORY (INHALATION) EVERY 12 HOURS
Qty: 180 BLISTER | Refills: 1 | Status: SHIPPED | OUTPATIENT
Start: 2024-12-12 | End: 2025-03-12

## 2024-12-12 RX ORDER — DENOSUMAB 60 MG/ML
INJECTION SUBCUTANEOUS
COMMUNITY
Start: 2024-10-16

## 2024-12-12 RX ORDER — BUPROPION HYDROCHLORIDE 150 MG/1
150 TABLET ORAL DAILY
Qty: 100 TABLET | Refills: 1 | Status: SHIPPED | OUTPATIENT
Start: 2024-12-12

## 2024-12-12 NOTE — PATIENT INSTRUCTIONS

## 2024-12-12 NOTE — PROGRESS NOTES
Name: Daphne York      : 1945      MRN: [id number]  Encounter Provider: Ilya Harry MD  Encounter Date: 2024   Encounter department: FAMILY PRACTICE AT [place]    Assessment & Plan  Environmental allergies  Used to see ENT in the past for her allergies when she was on allergy injections however she has been off of the medication for a while but would like to restart again.  Orders:    Ambulatory Referral to Otolaryngology; Future    Neck pain  Limited range of motion when looking side-to-side.  I think physical therapy would benefit patient's pain and range of motion as her problem is likely muscular.  PT referral placed today.  Encouraged conservative management for now.  May continue roll-on pain relief med which she gets over-the-counter.  Trial heating pad but avoid when using roll-on medication to avoid burning.  Asthma, unspecified asthma severity, unspecified whether complicated, unspecified whether persistent  Controlled.  Continue current inhaler regiment.  Refill sent to pharmacy.  Orders:    Fluticasone-Salmeterol (Advair) 100-50 mcg/dose inhaler; Inhale 1 puff every 12 (twelve) hours Rinse mouth after use.    Depression with anxiety  Controlled.  Continue Wellbutrin and Lexapro.    Orders:    buPROPion (WELLBUTRIN XL) 150 mg 24 hr tablet; Take 1 tablet (150 mg total) by mouth daily    Medicare annual wellness visit, subsequent            Preventive health issues were discussed with patient, and age appropriate screening tests were ordered as noted in patient's After Visit Summary. Personalized health advice and appropriate referrals for health education or preventive services given if needed, as noted in patient's After Visit Summary.    History of Present Illness     Patient presents to the office today for Medicare annual wellness visit and follow-up.  Feeling well has concerns for her neck pain today as well as allergies.  In the past she used to see an allergist  and would like to see a new one today.  She believes he was a ear nose and throat specialist and she used to get allergy shots.  Her allergies are decently controlled but they are starting to act up again and she would like to get on the shots again.  Her neck has been bothersome for quite some time now.  She is kind of dealt with it up till now but reports it is getting worse.  No pain that radiates down arms or spine.  It is kind of on both sides of her neck and somewhat across of it.  Sometimes it is worse with neck movement.  It only last for a few minutes but she does get the pain every day.  She does a roll-on pain cream that she gets over-the-counter which sort of helps.  No headaches or neurologic symptoms.       Patient Care Team:  Ilya Harry MD as PCP - General (Family Medicine)  MD Bill Muñoz, DO  Ayesha Mccracken, DO    Review of Systems   All other systems reviewed and are negative.    Medical History Reviewed by provider this encounter:  Tobacco  Allergies  Meds  Problems  Med Hx  Surg Hx  Fam Hx       Annual Wellness Visit Questionnaire   Daphne is here for her Subsequent Wellness visit. Last Medicare Wellness visit information reviewed, patient interviewed and updates made to the record today.      Health Risk Assessment:   Patient rates overall health as good. Patient feels that their physical health rating is same. Patient is very satisfied with their life. Eyesight was rated as same. Hearing was rated as same. Patient feels that their emotional and mental health rating is same. Patients states they are never, rarely angry. Patient states they are always unusually tired/fatigued. Pain experienced in the last 7 days has been some. Patient's pain rating has been 4/10. Patient states that she has experienced no weight loss or gain in last 6 months.     Depression Screening:   PHQ-9 Score: 3      Fall Risk Screening:   In the past year, patient has  experienced: history of falling in past year    Number of falls: 1  Injured during fall?: No    Feels unsteady when standing or walking?: No    Worried about falling?: No      Urinary Incontinence Screening:   Patient has leaked urine accidently in the last six months.     Home Safety:  Patient does not have trouble with stairs inside or outside of their home. Patient has working smoke alarms and has working carbon monoxide detector. Home safety hazards include: none.     Nutrition:   Current diet is Regular.     Medications:   Patient is not currently taking any over-the-counter supplements. Patient is able to manage medications.     Activities of Daily Living (ADLs)/Instrumental Activities of Daily Living (IADLs):   Walk and transfer into and out of bed and chair?: Yes  Dress and groom yourself?: Yes    Bathe or shower yourself?: Yes    Feed yourself? Yes  Do your laundry/housekeeping?: Yes  Manage your money, pay your bills and track your expenses?: Yes  Make your own meals?: Yes    Do your own shopping?: Yes    Previous Hospitalizations:   Any hospitalizations or ED visits within the last 12 months?: No      Advance Care Planning:   Living will: No    Durable POA for healthcare: No    Advanced directive: No    Advanced directive counseling given: Yes    Five wishes given: No    End of Life Decisions reviewed with patient: Yes      Cognitive Screening:   Provider or family/friend/caregiver concerned regarding cognition?: No    PREVENTIVE SCREENINGS      Cardiovascular Screening:    General: Screening Not Indicated and History Lipid Disorder      Diabetes Screening:     General: Screening Current      Colorectal Cancer Screening:     General: Screening Current      Breast Cancer Screening:     General: Screening Current      Cervical Cancer Screening:    General: Screening Not Indicated and Risks and Benefits Discussed      Osteoporosis Screening:    General: Risks and Benefits Discussed and Screening Not  "Indicated      Abdominal Aortic Aneurysm (AAA) Screening:        General: Risks and Benefits Discussed      Lung Cancer Screening:     General: Screening Not Indicated and Risks and Benefits Discussed      Hepatitis C Screening:    General: Screening Current    Screening, Brief Intervention, and Referral to Treatment (SBIRT)    Screening  Typical number of drinks in a day: 0  Typical number of drinks in a week: 0  Interpretation: Low risk drinking behavior.    Single Item Drug Screening:  How often have you used an illegal drug (including marijuana) or a prescription medication for non-medical reasons in the past year? never    Single Item Drug Screen Score: 0  Interpretation: Negative screen for possible drug use disorder    Brief Intervention  Alcohol & drug use screenings were reviewed. No concerns regarding substance use disorder identified.     Social Drivers of Health     Financial Resource Strain: Low Risk  (12/11/2023)    Overall Financial Resource Strain (CARDIA)     Difficulty of Paying Living Expenses: Not hard at all   Food Insecurity: No Food Insecurity (12/12/2024)    Hunger Vital Sign     Worried About Running Out of Food in the Last Year: Never true     Ran Out of Food in the Last Year: Never true   Transportation Needs: No Transportation Needs (12/12/2024)    PRAPARE - Transportation     Lack of Transportation (Medical): No     Lack of Transportation (Non-Medical): No   Housing Stability: Low Risk  (12/12/2024)    Housing Stability Vital Sign     Unable to Pay for Housing in the Last Year: No     Number of Times Moved in the Last Year: 0     Homeless in the Last Year: No   Utilities: Not At Risk (12/12/2024)    [facility] Utilities     Threatened with loss of utilities: No     No results found.    Objective   /78 (BP Location: Left arm, Patient Position: Sitting, Cuff Size: Standard)   Pulse 68   Temp (!) 95.9 °F (35.5 °C) (Tympanic)   Ht 5' 6\" (1.676 m)   Wt 84.8 kg (187 lb)   SpO2 95%   BMI " 30.18 kg/m²     Physical Exam  Vitals and nursing note reviewed.   Constitutional:       General: She is not in acute distress.     Appearance: Normal appearance. She is well-developed. She is not ill-appearing, toxic-appearing or diaphoretic.   HENT:      Head: Normocephalic and atraumatic.   Eyes:      General:         Right eye: No discharge.         Left eye: No discharge.      Extraocular Movements: Extraocular movements intact.      Conjunctiva/sclera: Conjunctivae normal.   Cardiovascular:      Rate and Rhythm: Normal rate and regular rhythm.      Pulses: Normal pulses.      Heart sounds: Normal heart sounds.   Pulmonary:      Effort: Pulmonary effort is normal.      Breath sounds: Normal breath sounds.   Musculoskeletal:         General: No swelling, tenderness, deformity or signs of injury.      Comments: Limited range of motion in neck when looking side-to-side.   Skin:     General: Skin is warm and dry.      Capillary Refill: Capillary refill takes less than 2 seconds.   Neurological:      Mental Status: She is alert and oriented to person, place, and time.   Psychiatric:         Mood and Affect: Mood normal.         Behavior: Behavior normal.         Thought Content: Thought content normal.         Judgment: Judgment normal.

## 2024-12-12 NOTE — ASSESSMENT & PLAN NOTE
Controlled.  Continue Wellbutrin and Lexapro.    Orders:    buPROPion (WELLBUTRIN XL) 150 mg 24 hr tablet; Take 1 tablet (150 mg total) by mouth daily

## 2025-01-03 ENCOUNTER — OFFICE VISIT (OUTPATIENT)
Dept: RHEUMATOLOGY | Facility: CLINIC | Age: 80
End: 2025-01-03

## 2025-01-03 VITALS
SYSTOLIC BLOOD PRESSURE: 136 MMHG | WEIGHT: 186 LBS | OXYGEN SATURATION: 97 % | BODY MASS INDEX: 29.89 KG/M2 | HEIGHT: 66 IN | DIASTOLIC BLOOD PRESSURE: 74 MMHG | HEART RATE: 72 BPM

## 2025-01-03 DIAGNOSIS — M35.3 PMR (POLYMYALGIA RHEUMATICA) (HCC): Primary | ICD-10-CM

## 2025-01-03 DIAGNOSIS — Z79.52 CURRENT CHRONIC USE OF SYSTEMIC STEROIDS: ICD-10-CM

## 2025-01-03 DIAGNOSIS — M81.0 AGE-RELATED OSTEOPOROSIS WITHOUT CURRENT PATHOLOGICAL FRACTURE: ICD-10-CM

## 2025-01-03 DIAGNOSIS — R79.82 CRP ELEVATED: ICD-10-CM

## 2025-01-03 DIAGNOSIS — M17.0 PRIMARY OSTEOARTHRITIS OF BOTH KNEES: ICD-10-CM

## 2025-01-03 DIAGNOSIS — I10 HYPERTENSION, UNSPECIFIED TYPE: ICD-10-CM

## 2025-01-03 RX ORDER — PREDNISONE 5 MG/1
10 TABLET ORAL DAILY
Qty: 60 TABLET | Refills: 11 | Status: SHIPPED | OUTPATIENT
Start: 2025-01-03

## 2025-02-11 DIAGNOSIS — J45.909 ASTHMA, UNSPECIFIED ASTHMA SEVERITY, UNSPECIFIED WHETHER COMPLICATED, UNSPECIFIED WHETHER PERSISTENT: ICD-10-CM

## 2025-02-11 DIAGNOSIS — I10 HYPERTENSION, UNSPECIFIED TYPE: ICD-10-CM

## 2025-02-11 DIAGNOSIS — E78.2 MIXED HYPERLIPIDEMIA: ICD-10-CM

## 2025-02-11 DIAGNOSIS — F41.8 DEPRESSION WITH ANXIETY: ICD-10-CM

## 2025-02-12 RX ORDER — FLUTICASONE PROPIONATE AND SALMETEROL 100; 50 UG/1; UG/1
1 POWDER RESPIRATORY (INHALATION) EVERY 12 HOURS
Qty: 180 BLISTER | Refills: 1 | Status: SHIPPED | OUTPATIENT
Start: 2025-02-12 | End: 2025-05-13

## 2025-02-12 RX ORDER — LOSARTAN POTASSIUM AND HYDROCHLOROTHIAZIDE 12.5; 1 MG/1; MG/1
1 TABLET ORAL DAILY
Qty: 100 TABLET | Refills: 1 | Status: SHIPPED | OUTPATIENT
Start: 2025-02-12

## 2025-02-12 RX ORDER — BUPROPION HYDROCHLORIDE 150 MG/1
150 TABLET ORAL DAILY
Qty: 100 TABLET | Refills: 1 | Status: SHIPPED | OUTPATIENT
Start: 2025-02-12

## 2025-02-12 RX ORDER — ATORVASTATIN CALCIUM 10 MG/1
10 TABLET, FILM COATED ORAL DAILY
Qty: 100 TABLET | Refills: 1 | Status: SHIPPED | OUTPATIENT
Start: 2025-02-12

## 2025-02-19 DIAGNOSIS — F32.89 OTHER DEPRESSION: ICD-10-CM

## 2025-02-19 RX ORDER — ESCITALOPRAM OXALATE 20 MG/1
20 TABLET ORAL DAILY
Qty: 90 TABLET | Refills: 0 | Status: SHIPPED | OUTPATIENT
Start: 2025-02-19

## 2025-02-19 NOTE — TELEPHONE ENCOUNTER
Reason for call:   [x] Refill   [] Prior Auth  [] Other:     Office:   [x] PCP/Provider -   [] Specialty/Provider -     Medication:   - Escitalopram 20mg- take 1 tablet by mouth daily      [facility]    Does the patient have enough for 3 days?   [] Yes   [x] No - Send as HP to POD     36.6

## 2025-04-08 ENCOUNTER — RA CDI HCC (OUTPATIENT)
Dept: OTHER | Facility: HOSPITAL | Age: 80
End: 2025-04-08

## 2025-04-16 ENCOUNTER — OFFICE VISIT (OUTPATIENT)
Dept: FAMILY MEDICINE CLINIC | Facility: CLINIC | Age: 80
End: 2025-04-16
Payer: COMMERCIAL

## 2025-04-16 VITALS
SYSTOLIC BLOOD PRESSURE: 134 MMHG | OXYGEN SATURATION: 98 % | HEART RATE: 69 BPM | DIASTOLIC BLOOD PRESSURE: 82 MMHG | BODY MASS INDEX: 29.89 KG/M2 | WEIGHT: 186 LBS | HEIGHT: 66 IN | TEMPERATURE: 98.2 F

## 2025-04-16 DIAGNOSIS — N18.31 STAGE 3A CHRONIC KIDNEY DISEASE (HCC): ICD-10-CM

## 2025-04-16 DIAGNOSIS — I10 BENIGN ESSENTIAL HYPERTENSION: Primary | Chronic | ICD-10-CM

## 2025-04-16 DIAGNOSIS — Z13.220 SCREENING FOR HYPERLIPIDEMIA: ICD-10-CM

## 2025-04-16 DIAGNOSIS — F41.8 DEPRESSION WITH ANXIETY: Chronic | ICD-10-CM

## 2025-04-16 PROCEDURE — G2211 COMPLEX E/M VISIT ADD ON: HCPCS | Performed by: FAMILY MEDICINE

## 2025-04-16 PROCEDURE — 99214 OFFICE O/P EST MOD 30 MIN: CPT | Performed by: FAMILY MEDICINE

## 2025-04-16 NOTE — PROGRESS NOTES
"Name: Daphne York      : 1945      MRN: [id number]  Encounter Provider: Ilya Harry MD  Encounter Date: 2025   Encounter department: FAMILY PRACTICE AT [place]  :  Assessment & Plan  Benign essential hypertension  Continue losartan hydrochlorothiazide.    Orders:    Comprehensive metabolic panel; Future    Lipid panel; Future    TSH, 3rd generation with Free T4 reflex; Future    CBC and differential; Future    Depression with anxiety  Controlled.  Continue Wellbutrin and Lexapro.           Stage 3a chronic kidney disease (HCC)  Lab Results   Component Value Date    EGFR 44 2024    EGFR 45 2023    EGFR 48 11/15/2022    CREATININE 1.19 2024    CREATININE 1.16 2023    CREATININE 1.12 11/15/2022   Due for lab work.  Get labs.  Continue to avoid nephrotoxic agents.  Optimize chronic health conditions.    Orders:    Comprehensive metabolic panel; Future    Lipid panel; Future    TSH, 3rd generation with Free T4 reflex; Future    CBC and differential; Future    Screening for hyperlipidemia    Orders:    Lipid panel; Future           History of Present Illness   Patient presents office today to follow-up on chronic conditions.  Feeling well no acute concerns or complaints today no refills needed.  Mood and anxiety well-controlled.  Blood pressures controlled at home as well.      Review of Systems   All other systems reviewed and are negative.      Objective   /82   Pulse 69   Temp 98.2 °F (36.8 °C) (Tympanic)   Ht 5' 6\" (1.676 m)   Wt 84.4 kg (186 lb)   SpO2 98%   BMI 30.02 kg/m²      Physical Exam  Vitals and nursing note reviewed.   Constitutional:       General: She is not in acute distress.     Appearance: Normal appearance. She is well-developed. She is not ill-appearing, toxic-appearing or diaphoretic.   HENT:      Head: Normocephalic and atraumatic.   Eyes:      General:         Right eye: No discharge.         Left eye: No discharge.      " Extraocular Movements: Extraocular movements intact.      Conjunctiva/sclera: Conjunctivae normal.   Cardiovascular:      Rate and Rhythm: Normal rate.   Pulmonary:      Effort: Pulmonary effort is normal.   Skin:     General: Skin is warm and dry.      Capillary Refill: Capillary refill takes less than 2 seconds.   Neurological:      Mental Status: She is alert and oriented to person, place, and time.   Psychiatric:         Mood and Affect: Mood normal.         Behavior: Behavior normal.         Thought Content: Thought content normal.         Judgment: Judgment normal.

## 2025-04-16 NOTE — ASSESSMENT & PLAN NOTE
Continue losartan hydrochlorothiazide.    Orders:    Comprehensive metabolic panel; Future    Lipid panel; Future    TSH, 3rd generation with Free T4 reflex; Future    CBC and differential; Future

## 2025-04-16 NOTE — ASSESSMENT & PLAN NOTE
Lab Results   Component Value Date    EGFR 44 03/11/2024    EGFR 45 04/26/2023    EGFR 48 11/15/2022    CREATININE 1.19 03/11/2024    CREATININE 1.16 04/26/2023    CREATININE 1.12 11/15/2022   Due for lab work.  Get labs.  Continue to avoid nephrotoxic agents.  Optimize chronic health conditions.    Orders:    Comprehensive metabolic panel; Future    Lipid panel; Future    TSH, 3rd generation with Free T4 reflex; Future    CBC and differential; Future

## 2025-04-19 ENCOUNTER — RESULTS FOLLOW-UP (OUTPATIENT)
Dept: FAMILY MEDICINE CLINIC | Facility: CLINIC | Age: 80
End: 2025-04-19

## 2025-05-15 DIAGNOSIS — F32.89 OTHER DEPRESSION: ICD-10-CM

## 2025-05-16 RX ORDER — ESCITALOPRAM OXALATE 20 MG/1
20 TABLET ORAL DAILY
Qty: 90 TABLET | Refills: 1 | Status: SHIPPED | OUTPATIENT
Start: 2025-05-16

## 2025-05-19 ENCOUNTER — APPOINTMENT (OUTPATIENT)
Dept: LAB | Facility: CLINIC | Age: 80
End: 2025-05-19
Payer: COMMERCIAL

## 2025-05-19 ENCOUNTER — OFFICE VISIT (OUTPATIENT)
Dept: RHEUMATOLOGY | Facility: CLINIC | Age: 80
End: 2025-05-19

## 2025-05-19 VITALS — BODY MASS INDEX: 30.02 KG/M2 | DIASTOLIC BLOOD PRESSURE: 76 MMHG | HEIGHT: 66 IN | SYSTOLIC BLOOD PRESSURE: 128 MMHG

## 2025-05-19 DIAGNOSIS — M35.3 PMR (POLYMYALGIA RHEUMATICA) (HCC): ICD-10-CM

## 2025-05-19 DIAGNOSIS — I10 BENIGN ESSENTIAL HYPERTENSION: Chronic | ICD-10-CM

## 2025-05-19 DIAGNOSIS — M17.0 PRIMARY OSTEOARTHRITIS OF BOTH KNEES: ICD-10-CM

## 2025-05-19 DIAGNOSIS — Z13.220 SCREENING FOR HYPERLIPIDEMIA: ICD-10-CM

## 2025-05-19 DIAGNOSIS — N18.31 STAGE 3A CHRONIC KIDNEY DISEASE (HCC): ICD-10-CM

## 2025-05-19 DIAGNOSIS — M81.0 AGE-RELATED OSTEOPOROSIS WITHOUT CURRENT PATHOLOGICAL FRACTURE: Primary | ICD-10-CM

## 2025-05-19 DIAGNOSIS — M81.0 AGE-RELATED OSTEOPOROSIS WITHOUT CURRENT PATHOLOGICAL FRACTURE: ICD-10-CM

## 2025-05-19 DIAGNOSIS — Z79.52 CURRENT CHRONIC USE OF SYSTEMIC STEROIDS: ICD-10-CM

## 2025-05-19 LAB
ANION GAP SERPL CALCULATED.3IONS-SCNC: 5 MMOL/L (ref 4–13)
BUN SERPL-MCNC: 21 MG/DL (ref 5–25)
CALCIUM SERPL-MCNC: 9.9 MG/DL (ref 8.4–10.2)
CHLORIDE SERPL-SCNC: 99 MMOL/L (ref 96–108)
CO2 SERPL-SCNC: 37 MMOL/L (ref 21–32)
CREAT SERPL-MCNC: 1.2 MG/DL (ref 0.6–1.3)
GFR SERPL CREATININE-BSD FRML MDRD: 43 ML/MIN/1.73SQ M
GLUCOSE SERPL-MCNC: 83 MG/DL (ref 65–140)
POTASSIUM SERPL-SCNC: 3.7 MMOL/L (ref 3.5–5.3)
SODIUM SERPL-SCNC: 141 MMOL/L (ref 135–147)

## 2025-05-19 PROCEDURE — 36415 COLL VENOUS BLD VENIPUNCTURE: CPT

## 2025-05-19 PROCEDURE — 80048 BASIC METABOLIC PNL TOTAL CA: CPT

## 2025-05-19 NOTE — PROGRESS NOTES
Name: Daphne York      : 1945      MRN: [id number]  Encounter Provider: Ayaan Manley MD  Encounter Date: 2025   Encounter department: [facility] RHEUMATOLOGY [facility]  :  Assessment & Plan  Age-related osteoporosis without current pathological fracture  Bone health: DXA  high risk per FRAX. Has tried oral Fosamax with severe GERD. + calcium/D supplements. No fragility fractures or malignancies. Natural menopause in her 50s. Mother had fragility fractures.   S/p dose # 1 SC Prolia 10/16/24.  Check labs  Schedule next Prolia dose after reviewing labs   Calcium/D supplements  Reviewed fall protection  Orders:    Basic metabolic panel; Standing    PMR (polymyalgia rheumatica) ([facility])  Reviewed records per Dr Tim  On prednisone since  for PMR  She has been on varying baseline doses 5-10 mg daily  (she increases dose to 10 mg for 1-2 weeks during flare ups)  No GCA symptoms  Reviewed prior labs LAQUITA, RF, CCP checked and normal  Orders:    Basic metabolic panel; Standing    Current chronic use of systemic steroids  Continue SC Prolia        Primary osteoarthritis of both knees  S/p b/l knee replacement           History of Present Illness   HPI  Daphne oYrk is a 80 y.o. female who presents further f/u PMR, osteoporosis  History obtained from: patient         HPI: Hannah York is a 78 y/o female who presents for further evaluation PMR. She has past medical history asthma, HTN, GERD, PMR    Since last visit she had to increase dose to 15 mg daily    Her baseline 5-10 mg daily since      She was seeing outside rheumatologist Dr Tim and transferring care     Reviewed records per Dr Tim.     Onset of symptoms 2021 after her COVID vaccine. Pain and stiffness shoulders, hips, hands     Was Dx with PMR and started on prednisone which has helped her     She has been on prednisone since then     Has had flare ups at doses less than 5 mg  daily     When she has a flare up she increases her dose to 10 mg for 1-2 weeks and then goes down to her baseline 5 mg daily.      Bone health: DXA 7/24 high risk per FRAX. Has tried oral Fosamax with severe GERD. S/p dose # 1 SC Prolia 10/16/24. + calcium/D supplements. No fragility fractures or malignancies. Natural menopause in her 50s. Mother had fragility fractures.     S/p b/l knee replacement    Review of Systems  Pertinent Medical History       --------------------------------------------------------------------------------------------------------        ROS:      - for: Fevers, Chills or sweats.  No HAs or scalp tenderness.  No jaw claudication.  No acute visual or eye changes.  No dry eyes.  No auditory complaints.  No oral lesions or ulcers.  No dry mouth.  No sore throat or cough.  No chest pains or palpitations.  No shortness of breath, dyspnea on exertion or wheezing.  No hemotpysis.  No abdominal pain, GERD symptoms, diarrhea or constipation.  No urinary complaints.  No numbness, tingling or weakness.  No rashes.    All other ROS was reviewed and negative except as above         --------------------------------------------------------------------------------------------------------    Past Medical History    Past Medical History:   Diagnosis Date    Allergic     Anemia     Anxiety     Arthritis     Asthma     Chronic kidney disease     CKD stage 3    Hyperlipidemia     Hypertension     Nasal polyps     Polymyalgia rheumatica (HCC)            Past Surgical History    Past Surgical History:   Procedure Laterality Date    COLONOSCOPY      DILATION AND CURETTAGE OF UTERUS      EYE SURGERY      HYSTEROSCOPY      of uterus  d&c 1997    HYSTEROSCOPY W/ POLYPECTOMY      2000 and 2001  resolved    NOSE SURGERY      nasal endoscopy polypectomy    NOSE SURGERY      NV ARTHRP KNE CONDYLE&PLATU MEDIAL&LAT COMPARTMENTS Right 10/07/2020    Procedure: KNEE TOTAL ARTHROPLASTY;  Surgeon: Jitendra Sandoval DO;  Location:   MAIN OR;  Service: Orthopedics    MT ESOPHAGOGASTRODUODENOSCOPY TRANSORAL DIAGNOSTIC N/A 02/06/2019    Procedure: ESOPHAGOGASTRODUODENOSCOPY (EGD);  Surgeon: Rachell De Anda MD;  Location: [facility] GI LAB;  Service: Gastroenterology    TONSILLECTOMY      TOTAL KNEE ARTHROPLASTY Left            Family History    Family History   Problem Relation Age of Onset    Heart attack Mother     Osteoporosis Mother     Uterine cancer Mother     Heart disease Mother     Rheumatologic disease Mother     No Known Problems Father     No Known Problems Daughter     No Known Problems Maternal Grandmother     No Known Problems Maternal Aunt             Social History    Social History  Tobacco Use    Smoking status: Never    Smokeless tobacco: Never   Vaping Use    Vaping status: Never Used   Substance Use Topics    Alcohol use: Yes     Comment: social    Drug use: No     Retired nurse     Allergies    Allergies[1]      Medications    Current Outpatient Medications   Medication Instructions    acetaminophen (TYLENOL) 650 mg, Oral, Every 4 hours PRN    atorvastatin (LIPITOR) 10 mg, Oral, Daily    buPROPion (WELLBUTRIN XL) 150 mg, Oral, Daily    Calcium Carbonate 1500 (600 Ca) MG TABS 1 tablet, 2 times daily    Cetirizine HCl (ZYRTEC ALLERGY PO) 1 tablet    denosumab (Prolia) 60 mg/mL Inject 1 mL by subcutaneous route.    docusate sodium (COLACE) 100 mg, Oral, 2 times daily    escitalopram (LEXAPRO) 20 mg, Oral, Daily    esomeprazole (NEXIUM) 40 mg, Oral, Daily    fluticasone (FLONASE) 50 mcg/act nasal spray into each nostril    Fluticasone-Salmeterol (Advair) 100-50 mcg/dose inhaler 1 puff, Inhalation, Every 12 hours, Rinse mouth after use.    losartan-hydrochlorothiazide (HYZAAR) 100-12.5 MG per tablet 1 tablet, Oral, Daily    predniSONE 5 mg, Daily    predniSONE 10 mg, Oral, Daily    Zoster Vaccine Live (ZOSTAVAX SC)           ________________________________________________________________________    Results Review                "  Objective   /76   Ht 5' 6\" (1.676 m)   BMI 30.02 kg/m²      Physical Exam    GEN: AAO, No apparent distress.  Patient is well developed.  HEENT:  Pupils are equal, round and reactive.  Sclera are clear.  Fundoscopic exam is normal.  External ears are without lesions.  Oral pharynx is clear of ulcers or other lesions.  MMM.   NECK:  Supple.  There is no adenopathy appreciable in anterior or posterior cervical chains or supraclavicularly.  JVP is normal.    HEART: Regular rate and rhythm.  There is no appreciable murmur, gallop or rub.  LUNGS: Clear to auscultation.  ABD:  Soft, without tenderness, rebound or guarding.  No appreciable organomegally.  NEURO: Speech and cognition are normal.  Strength is 5/5 throughout.  Tone is normal.  DTRs are 2/4 at the knees, ankles and elbows.  Gait is normal.  SKIN: There are no rashes or lesions    MUSCULOSKELETAL:   -Hands: normal  -Wrists: normal  -Elbows: normal  -Shoulders: normal  -Neck: normal  -Back: normal  -Hips: normal  -Knees: normal  -Ankles: normal  -Feet: normal    Thank you for involving me in this patient's care.        Ayaan Manley MD  [facility] Rheumatology        I have spent a total time of 42 minutes in caring for this patient on the day of the visit/encounter including Diagnostic results, Prognosis, Risk factor reductions, Impressions, Counseling / Coordination of care, Documenting in the medical record, Reviewing/placing orders in the medical record (including tests, medications, and/or procedures), and Obtaining or reviewing history  .         [1]   Allergies  Allergen Reactions    Clarithromycin GI Intolerance     Reaction Date: 12Apr2011;     Codeine Hives     Reaction Date: 12Apr2011;     Estradiol      Annotation - [id number]: ELEVATES BP    Hydrocodone Hives    Hydrocodone-Acetaminophen Rash    Meloxicam Edema     Feet and ankles swell    Tramadol Other (See Comments)     red,itchy     "

## 2025-05-27 ENCOUNTER — TELEPHONE (OUTPATIENT)
Dept: FAMILY MEDICINE CLINIC | Facility: CLINIC | Age: 80
End: 2025-05-27

## 2025-06-12 ENCOUNTER — CLINICAL SUPPORT (OUTPATIENT)
Dept: RHEUMATOLOGY | Facility: CLINIC | Age: 80
End: 2025-06-12

## 2025-06-12 VITALS
DIASTOLIC BLOOD PRESSURE: 80 MMHG | OXYGEN SATURATION: 97 % | HEART RATE: 90 BPM | SYSTOLIC BLOOD PRESSURE: 132 MMHG | WEIGHT: 183 LBS | BODY MASS INDEX: 29.54 KG/M2

## 2025-06-12 DIAGNOSIS — M81.0 OSTEOPOROSIS, UNSPECIFIED OSTEOPOROSIS TYPE, UNSPECIFIED PATHOLOGICAL FRACTURE PRESENCE: ICD-10-CM

## 2025-06-12 DIAGNOSIS — M81.0 AGE-RELATED OSTEOPOROSIS WITHOUT CURRENT PATHOLOGICAL FRACTURE: ICD-10-CM

## 2025-06-12 DIAGNOSIS — Z79.899 ENCOUNTER FOR LONG-TERM (CURRENT) USE OF MEDICATIONS: Primary | ICD-10-CM

## 2025-06-12 NOTE — PROGRESS NOTES
Assessment/Plan:    Daphne York came into the [facility] Rheumatology Office today 06/12/25 to receive Prolia injection.      Verbal consent obtained.  Consent given by: patient    patient states patient has been medically healthy with no underlining concerns/complications.      Daphne York presents with no symptoms today.       All insturctions were reviewed with the patient.    If the patient should have any questions/concerns, advised patient to contacted [facility] Rheumatology Office.       Subjective:     History provided by: patient    Patient ID: Daphne York is a 80 y.o. female      Objective:    There were no vitals filed for this visit.    Patient tolerated the injection well without any complications.  Injection site/s left arm.  Number of Dose 2  Medication was provided by office.  BUY & BILL        Patient signed consent form yes   Patient signed ABN form no (If no patient is not a medicare patient).   Patient waited 15 minutes after injection no (This only applies to patient's receiving first time injection).       Last Visit: 6/5/2025  Next visit:Visit date not found

## (undated) DEVICE — HOOD: Brand: FLYTE, SURGICOOL

## (undated) DEVICE — READY WET SKIN SCRUB TRAY-LF: Brand: MEDLINE INDUSTRIES, INC.

## (undated) DEVICE — MEDI-VAC YANK SUCT HNDL W/TPRD BULBOUS TIP: Brand: CARDINAL HEALTH

## (undated) DEVICE — FRAZIER SUCTION INSTRUMENT 18 FR W/OBTURATOR, NO CONTROL VENT: Brand: FRAZIER

## (undated) DEVICE — BETHLEHEM UNIV TOTAL KNEE, KIT: Brand: CARDINAL HEALTH

## (undated) DEVICE — COBAN 6 IN STERILE

## (undated) DEVICE — DRAIN CHANNEL RND FULL FLUTED 19FR W/TROCAR

## (undated) DEVICE — GARMENT,MEDLINE,DVT,INT,CALF,FOAM,MED: Brand: MEDLINE

## (undated) DEVICE — SUT VICRYL 2-0 CP-1 27 IN J266H

## (undated) DEVICE — SUT VICRYL 0 CP-1 27 IN J267H

## (undated) DEVICE — GAUZE SPONGES,16 PLY: Brand: CURITY

## (undated) DEVICE — PENCIL ROCKER SWITCH CAUTERY HAND CONTROL

## (undated) DEVICE — BLOOD CONSERVATION SYSTEM WITH QUICK DISCONNECT AND PVC DRAIN: Brand: CBCII CONSTAVAC

## (undated) DEVICE — STRAP LITHOTOMY CANDY CANE

## (undated) DEVICE — INTENDED FOR TISSUE SEPARATION, AND OTHER PROCEDURES THAT REQUIRE A SHARP SURGICAL BLADE TO PUNCTURE OR CUT.: Brand: BARD-PARKER ® CARBON RIB-BACK BLADES

## (undated) DEVICE — STAPLER SKIN 35 WIDE ULC APPOSE

## (undated) DEVICE — BLADE REAMER PATELLA 46MM

## (undated) DEVICE — PREP SURGICAL PURPREP 26ML

## (undated) DEVICE — SAW BLADE RECIPROCATING 179

## (undated) DEVICE — Device

## (undated) DEVICE — SKIN MARKER DUAL TIP WITH RULER CAP, FLEXIBLE RULER AND LABELS: Brand: DEVON

## (undated) DEVICE — GLOVE INDICATOR PI UNDERGLOVE SZ 8 BLUE

## (undated) DEVICE — ACE WRAP 6 IN XL STERILE

## (undated) DEVICE — GLOVE SRG BIOGEL 7.5

## (undated) DEVICE — CEMENT BOWL VACUUM MIXING

## (undated) DEVICE — FAN SPRAY KIT: Brand: PULSAVAC®

## (undated) DEVICE — ABDOMINAL PAD: Brand: DERMACEA

## (undated) DEVICE — PADDING CAST 6IN COTTON STRL

## (undated) DEVICE — 3M™ DURAPORE™ SURGICAL TAPE 1538-3, 3 INCH X 10 YARD (7,5CM X 9,1M), 4 ROLLS/BOX: Brand: 3M™ DURAPORE™

## (undated) DEVICE — 4-PORT MANIFOLD: Brand: NEPTUNE 2

## (undated) DEVICE — SURGICAL GOWN, XL SMARTSLEEVE: Brand: CONVERTORS

## (undated) DEVICE — DRESSING XEROFORM 5 X 9

## (undated) DEVICE — SUT VICRYL 1 CP 27 IN J196H

## (undated) DEVICE — 3M™ IOBAN™ 2 ANTIMICROBIAL INCISE DRAPE 6651EZ: Brand: IOBAN™ 2

## (undated) DEVICE — CAPIT KNEE GSF FLX CEM FEM/CEM TIB/FLX SURF/STD PAT - ZIMMER

## (undated) DEVICE — NO-SCRATCH ™ SMALL WHITNEY CURETTE ™ IS A SINGLE-USE, PLASTIC CURETTE FOR QUICKLY APPLYING, MANIPULATING AND REMOVING BONE CEMENT DURING HIP AND KNEE REPLACEMENT SURGERY. THE PLASTIC IS SOFTER THAN STEEL INSTRUMENTS, REDUCING THE RISK OF DAMAGING THE PROSTHESIS WITH METAL INSTRUMENTS.  THE CURETTE’S 6MM TIP REMOVES EXCESS CEMENT FROM REPLACEMENT HIPS AND KNEES. EASY-TO-MANEUVER, THE SMALL BLUE CURETTE LETS YOU REMOVE CEMENT FROM ALL EDGES OF THE PROSTHESIS.NO-SCRATCH WHITNEY SMALL CURETTE FEATURES:SAFER THAN STEEL- MADE OF PLASTIC - STURDY YET SOFTER THAN SURGICAL STEEL.HANDIER- EACH TOOL HAS A MOLDED-IN THUMB INDENTATION INSTANTLY ORIENTING THE TOOL.- EASIER TO MANEUVER IN HARD TO SEE PLACES.- COLOR-CODED FOR EASY IDENTIFICATION.FASTER- COMES INDIVIDUALLY PACKAGED IN STERILE, PEEL OPEN POUCH, READY TO GO.- APPLIES, MANIPULATES, OR REMOVES CEMENT WITH FINGERTIP PRECISION.ECONOMICAL- THE COST OF A SINGLE REVISION DWARFS THE COST OF A SINGLE-USE CURETTE. - DISPOSABLE – THERE’S NO NEED TO WASTE TIME REMOVING HARDENED CEMENT OR RE-STERILIZING TOOLS.- LESS EXPENSIVE TO BUY AND INVENTORY - ORDER ONLY THE TOOL YOU USE.- PACKAGED 25 INDIVIDUALLY WRAPPED TOOLS TO A CARTON FOR CONVENIENT SHELF STORAGE.: Brand: WHITNEY NO-SCRATCH CURETTE (SMALL)

## (undated) DEVICE — BAG DECANTER

## (undated) DEVICE — BLADE SAW HALL MICROPOWER OSC 1.27MM X 19.5MM X 86MM

## (undated) DEVICE — GLOVE SRG BIOGEL 8

## (undated) DEVICE — STOCKINETTE,IMPERVIOUS,12X48,STERILE: Brand: MEDLINE

## (undated) DEVICE — TUBING SUCTION 5MM X 12 FT

## (undated) DEVICE — TOWEL SURG XR DETECT GREEN STRL RFD

## (undated) DEVICE — GLOVE INDICATOR PI UNDERGLOVE SZ 7.5 BLUE

## (undated) DEVICE — 3M™ STERI-DRAPE™ U-DRAPE 1015: Brand: STERI-DRAPE™

## (undated) DEVICE — TIBURON SPLIT SHEET: Brand: CONVERTORS